# Patient Record
Sex: FEMALE | Race: BLACK OR AFRICAN AMERICAN | Employment: OTHER | URBAN - METROPOLITAN AREA
[De-identification: names, ages, dates, MRNs, and addresses within clinical notes are randomized per-mention and may not be internally consistent; named-entity substitution may affect disease eponyms.]

---

## 2017-01-05 ENCOUNTER — NURSE NAVIGATOR (OUTPATIENT)
Dept: FAMILY MEDICINE CLINIC | Age: 82
End: 2017-01-05

## 2017-01-16 ENCOUNTER — OFFICE VISIT (OUTPATIENT)
Dept: FAMILY MEDICINE CLINIC | Age: 82
End: 2017-01-16

## 2017-01-16 VITALS
OXYGEN SATURATION: 98 % | TEMPERATURE: 97.7 F | RESPIRATION RATE: 18 BRPM | SYSTOLIC BLOOD PRESSURE: 135 MMHG | HEIGHT: 67 IN | HEART RATE: 60 BPM | DIASTOLIC BLOOD PRESSURE: 70 MMHG

## 2017-01-16 DIAGNOSIS — K21.00 GASTROESOPHAGEAL REFLUX DISEASE WITH ESOPHAGITIS: ICD-10-CM

## 2017-01-16 DIAGNOSIS — E11.9 TYPE 2 DIABETES MELLITUS WITHOUT COMPLICATION, WITHOUT LONG-TERM CURRENT USE OF INSULIN (HCC): Primary | ICD-10-CM

## 2017-01-16 DIAGNOSIS — I10 ESSENTIAL HYPERTENSION: ICD-10-CM

## 2017-01-16 LAB — HBA1C MFR BLD HPLC: 4.9 %

## 2017-01-16 NOTE — PROGRESS NOTES
Chief Complaint   Patient presents with   [facility] Follow Up     pt's daughter states  pt had mild troponin elevation

## 2017-01-16 NOTE — PROGRESS NOTES
Chronic Illness Visit    Today's Date:  2017  Patient's Name: Sneha Overton   Patient's :  2/3/1933     History:     Chief Complaint   Patient presents with   [facility] Follow Up     pt's daughter states  pt had mild troponin elevation       Sneha Overton is a 80 y.o. female presenting for chronic illness follow up. Since last visit patient was briefly in a nursing home she is now back home with her daughter. She is wheelchair bound and needs help with her ADLs. Diabetes/Hypertension     BP today is at goal today <130/80. Patients risk factors include: none   Home Blood Sugars are in the following range: . Denies hypoglycemic episodes   Medications regimen is as follows: lisinopril and verapamil   Last HgbA1C: 4.2017  Daily exercise and diet are as follows: reports minimal amount of walking. Eats plenty of fruits and vegetables   Weight is stable since the last visit. Encouraged that patient eat 3 regular meals a day. Takes the below meds regularly with no side effects. Taking daily ASA 81 mg.    Last LDL:86   Last DM eye exam: 2014   Last urine microalbumin: 3/25/14     GERD     Patient reports history of GERD   Practicing life style modifications (limited amounts of acidic foods) and using the below medication   Reports improvement of symptoms with ranitidine denies side effects   Denies N/V, excessive belching and dysphagia   No recent endocsopy or colonoscopy    Past Medical History   Diagnosis Date    Arthritis     Breast cancer ([address]      right breast (radiation)    Diabetes ([address]      diet controlled    GERD (gastroesophageal reflux disease)     Hypertension     Memory changes      Past Surgical History   Procedure Laterality Date    Hx appendectomy      Hx cholecystectomy      Hx breast biopsy       Social History     Social History    Marital status:      Spouse name: N/A    Number of children: N/A    Years of education: N/A     Social History Main Topics    Smoking status: Never Smoker    Smokeless tobacco: Never Used    Alcohol use No    Drug use: No    Sexual activity: No     Other Topics Concern    None     Social History Narrative     Family History   Problem Relation Age of Onset    Cancer Mother     Cancer Father     Diabetes Sister     Diabetes Paternal Grandmother     Cancer Other      Allergies   Allergen Reactions    Pcn [Penicillins] Other (comments)     Unknown-happened when she was very young       Problem List:      Patient Active Problem List   Diagnosis Code    HTN (hypertension) I10    Diabetes mellitus ([facility] E11.9    GERD (gastroesophageal reflux disease) K21.9    Frozen shoulder M75.00    Breast cancer ([facility] C50.919    OA (osteoarthritis) of knee M17.9    Osteoarthritis, 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    Tear of rotator cuff M75.100    Memory loss R41.3    ACS (acute coronary syndrome) ([facility] I24.9    Elevated troponin R79.89    NSTEMI (non-ST elevated myocardial infarction) ([facility] I21.4    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, F02.80    UTI (urinary tract infection) N39.0       Medications:     Current Outpatient Prescriptions   Medication Sig    atorvastatin (LIPITOR) 80 mg tablet take 1 tablet by mouth once daily at bedtime    aspirin delayed-release 81 mg tablet take 1 tablet by mouth once daily    lisinopril (PRINIVIL, ZESTRIL) 10 mg tablet take 1 tablet by mouth once daily    ferrous sulfate 325 mg (65 mg iron) tablet Take 1 Tab by mouth two (2) times a day.  CALCIUM CARBONATE (CALCIUM 600) 1,500 (600) mg tab Take 1 Tab by mouth daily. take 1 tablet by mouth twice a day    ranitidine (ZANTAC) 150 mg tablet take 1 tablet by mouth twice a day    verapamil ER (VERELAN) 240 mg SR capsule Take 1 Cap by mouth daily.     OTHER Daughter states PCP gave compound cream    memantine (NAMENDA) 10 mg tablet 1 q am    ACCU-CHEK DINH PLUS TEST STRP strip use as directed    ACCU-CHEK DINH PLUS TEST STRP strip use as directed    glucosamine-chondroit-vit c-mn 500-400 mg capsule Take 1 Cap by mouth two (2) times a day.  Lancets (ACCU-CHEK MULTICLIX LANCET) misc 100 accu-chek lancets    multivitamin (DAILY MULTIPLE) tablet Take 1 Tab by mouth daily.  hydrocortisone (ALA-DEEDEE) 1 % lotion Apply  to affected area two (2) times a day. use thin layer    bimatoprost (LUMIGAN) 0.03 % ophthalmic drops Administer 1 Drop to both eyes every evening.  peg 400-propylene glycol (SYSTANE) 0.4-0.3 % drop Administer  to left eye as needed. No current facility-administered medications for this visit. Constitutional: positive for fatigue. negative for fevers, chills, sweats. Respiratory: negative for cough, sputum or wheezing  Cardiovascular: negative for chest pain, chest pressure/discomfort, dyspnea  Gastrointestinal: negative for nausea, vomiting, diarrhea, constipation and abdominal pain  Musculoskeletal:negative for myalgias and arthralgias  Neurological: negative for headaches and dizziness  Behavioral/Psych: negative for anxiety and depression    Physical Assessment:   VS:    Vitals:    01/16/17 1306 01/16/17 1413   BP: 157/72 135/70   Pulse: 60    Resp: 18    Temp: 97.7 °F (36.5 °C)    TempSrc: Oral    SpO2: 98%    Height: 5' 7\" (1.702 m)          Lab Results   Component Value Date/Time    Sodium 141 10/25/2016 02:01 AM    Potassium 3.8 10/25/2016 02:01 AM    Chloride 107 10/25/2016 02:01 AM    CO2 29 10/25/2016 02:01 AM    Anion gap 5 10/25/2016 02:01 AM    Glucose 71 10/25/2016 02:01 AM    BUN 19 10/25/2016 02:01 AM    Creatinine 0.69 10/25/2016 02:01 AM    BUN/Creatinine ratio 28 10/25/2016 02:01 AM    GFR est AA >60 10/25/2016 02:01 AM    GFR est non-AA >60 10/25/2016 02:01 AM    Calcium 8.4 10/25/2016 02:01 AM       General:   Well-groomed, well-nourished, in no distress, pleasant, alert, appropriate and conversant.    Mouth:  Good dentition, oropharynx WNL without membranes, exudates, petechiae or ulcers  Neck:   Neck supple, no swelling, mass or tenderness, no thyromegaly  Cardiovasc:   RRR, no MRG. Pulses 2+ and symmetric at distal extremities. Pulmonary:   Lungs clear bilaterally. Normal respiratory effort. Abdomen:   Abdomen soft, NT, ND, NAB. Extremities:   No edema, no TTP bilateral calves. LEs warm and well-perfused. Neuro:   Alert and oriented, no focal deficits. No facial asymmetry noted. Skin:    Diffuse hyperpigmented plaques on the back of the neck. No erythema or edema present. Psych:  No pressured speech or abnormal thought content        Assessment/Plan & Orders:       1. Type 2 diabetes mellitus without complication, without long-term current use of insulin ([facility]    2. Essential hypertension    3. Gastroesophageal reflux disease with esophagitis        Orders Placed This Encounter    AMB POC HEMOGLOBIN A1C       1. Hypertension BP at goal continue w/ current meds    2. Diabetes last HgbA1c 4.9 continue with diet controll.      3. GERD continue ranitidine      Follow up in 2-3 months     Shyam Augustine MD  Family Medicine  1/16/2017  1:00 PM

## 2017-01-16 NOTE — MR AVS SNAPSHOT
Visit Information Date & Time Provider Department Dept. Phone Encounter #  
 1/16/2017  1:00 PM Jen Hutton MD [facility] [phone] [id number] Your Appointments 3/6/2017  3:00 PM  
Follow Up with Griselda Avitia MD  
[facility]) Appt Note: 6 month f/up; PT FAMILY R/S FROM 11/23/2016  
 [address] [place]  [place] [address] [phone]  
  
   
 [place] [id number] Upcoming Health Maintenance Date Due  
 FOOT EXAM Q1 9/29/2015 EYE EXAM RETINAL OR DILATED Q1 5/11/2016 HEMOGLOBIN A1C Q6M 10/12/2016 Pneumococcal 65+ High/Highest Risk (2 of 2 - PPSV23) 11/4/2016 MICROALBUMIN Q1 4/12/2017 MEDICARE YEARLY EXAM 4/13/2017 GLAUCOMA SCREENING Q2Y 5/11/2017 LIPID PANEL Q1 10/25/2017 DTaP/Tdap/Td series (2 - Td) 9/9/2026 Allergies as of 1/16/2017  Review Complete On: 1/16/2017 By: Moises Vidales LPN Severity Noted Reaction Type Reactions Pcn [Penicillins]  03/05/2013    Other (comments) Unknown-happened when she was very young Current Immunizations  Reviewed on 10/19/2016 Name Date Influenza High Dose Vaccine PF 9/9/2016 Influenza Vaccine 12/29/2014, 12/26/2013 Not reviewed this visit You Were Diagnosed With   
  
 Codes Comments Type 2 diabetes mellitus without complication, without long-term current use of insulin (HCC)    -  Primary ICD-10-CM: E11.9 ICD-9-CM: 250.00 Essential hypertension     ICD-10-CM: I10 
ICD-9-CM: 401.9 Vitals BP Pulse Temp Resp Height(growth percentile) SpO2  
 157/72 (BP 1 Location: Left arm, BP Patient Position: Sitting) 60 97.7 °F (36.5 °C) (Oral) 18 5' 7\" (1.702 m) 98% OB Status Smoking Status Postmenopausal Never Smoker Vitals History [facility] [address] Your Updated Medication List  
  
   
This list is accurate as of: 1/16/17  2:07 PM.  Always use your most recent med list.  
  
  
  
  
 * ACCU-CHEK DINH PLUS TEST STRP strip Generic drug:  glucose blood VI test strips  
use as directed * ACCU-CHEK DINH PLUS TEST STRP strip Generic drug:  glucose blood VI test strips  
use as directed  
  
 aspirin delayed-release 81 mg tablet  
take 1 tablet by mouth once daily  
  
 atorvastatin 80 mg tablet Commonly known as:  LIPITOR  
take 1 tablet by mouth once daily at bedtime CALCIUM CARBONATE 600 mg (1,500 mg) tablet Generic drug:  calcium carbonate Take 1 Tab by mouth daily. take 1 tablet by mouth twice a day  
  
 ferrous sulfate 325 mg (65 mg iron) tablet Take 1 Tab by mouth two (2) times a day. glucosamine-chondroit-vit c-mn 500-400 mg capsule Take 1 Cap by mouth two (2) times a day. hydrocortisone 1 % lotion Commonly known as:  ALA-DEEDEE Apply  to affected area two (2) times a day. use thin layer Lancets Misc Commonly known as:  ACCU-CHEK MULTICLIX LANCET  
100 accu-chek lancets  
  
 lisinopril 10 mg tablet Commonly known as:  PRINIVIL, ZESTRIL  
take 1 tablet by mouth once daily LUMIGAN 0.03 % ophthalmic drops Generic drug:  bimatoprost  
Administer 1 Drop to both eyes every evening. memantine 10 mg tablet Commonly known as:  NAMENDA  
1 q am  
  
 multivitamin tablet Commonly known as:  DAILY MULTIPLE Take 1 Tab by mouth daily. OTHER Daughter states PCP gave compound cream  
  
 raNITIdine 150 mg tablet Commonly known as:  ZANTAC  
take 1 tablet by mouth twice a day SYSTANE (PROPYLENE GLYCOL) 0.4-0.3 % Drop Generic drug:  peg 400-propylene glycol Administer  to left eye as needed. verapamil  mg ER capsule Commonly known as:  Immanuel Arriaza Take 1 Cap by mouth daily. * Notice: This list has 2 medication(s) that are the same as other medications prescribed for you. Read the directions carefully, and ask your doctor or other care provider to review them with you. We Performed the Following AMB POC HEMOGLOBIN A1C [64065 CPT(R)] Patient Instructions Eating Healthy Foods: Care Instructions Your Care Instructions Eating healthy foods can help lower your risk for disease. Healthy food gives you energy and keeps your heart strong, your brain active, your muscles working, and your bones strong. A healthy diet includes a variety of foods from the basic food groups: grains, vegetables, fruits, milk and milk products, and meat and beans. Some people may eat more of their favorite foods from only one food group and, as a result, miss getting the nutrients they need. So, it is important to pay attention not only to what you eat but also to what you are missing from your diet. You can eat a healthy, balanced diet by making a few small changes. Follow-up care is a key part of your treatment and safety. Be sure to make and go to all appointments, and call your doctor if you are having problems. Its also a good idea to know your test results and keep a list of the medicines you take. How can you care for yourself at home? Look at what you eat · Keep a food diary for a week or two and record everything you eat or drink. Track the number of servings you eat from each food group. · For a balanced diet every day, eat a variety of: ¨ 6 or more ounce-equivalents of grains, such as cereals, breads, crackers, rice, or pasta, every day. An ounce-equivalent is 1 slice of bread, 1 cup of ready-to-eat cereal, or ½ cup of cooked rice, cooked pasta, or cooked cereal. 
¨ 2½ cups of vegetables, especially: § Dark-green vegetables such as broccoli and spinach. § Orange vegetables such as carrots and sweet potatoes. § Dry beans (such as no and kidney beans) and peas (such as lentils). ¨ 2 cups of fresh, frozen, or canned fruit. A small apple or 1 banana or orange equals 1 cup. ¨ 3 cups of nonfat or low-fat milk, yogurt, or other milk products. ¨ 5½ ounces of meat and beans, such as chicken, fish, lean meat, beans, nuts, and seeds. One egg, 1 tablespoon of peanut butter, ½ ounce nuts or seeds, or ¼ cup of cooked beans equals 1 ounce of meat. · Learn how to read food labels for serving sizes and ingredients. Fast-food and convenience-food meals often contain few or no fruits or vegetables. Make sure you eat some fruits and vegetables to make the meal more nutritious. · Look at your food diary. For each food group, add up what you have eaten and then divide the total by the number of days. This will give you an idea of how much you are eating from each food group. See if you can find some ways to change your diet to make it more healthy. Start small · Do not try to make dramatic changes to your diet all at once. You might feel that you are missing out on your favorite foods and then be more likely to fail. · Start slowly, and gradually change your habits. Try some of the following: ¨ Use whole wheat bread instead of white bread. ¨ Use nonfat or low-fat milk instead of whole milk. ¨ Eat brown rice instead of white rice, and eat whole wheat pasta instead of white-flour pasta. ¨ Try low-fat cheeses and low-fat yogurt. ¨ Add more fruits and vegetables to meals and have them for snacks. ¨ Add lettuce, tomato, cucumber, and onion to sandwiches. ¨ Add fruit to yogurt and cereal. 
Enjoy food · You can still eat your favorite foods. You just may need to eat less of them. If your favorite foods are high in fat, salt, and sugar, limit how often you eat them, but do not cut them out entirely. · Eat a wide variety of foods. Make healthy choices when eating out · The type of restaurant you choose can help you make healthy choices. Even fast-food chains are now offering more low-fat or healthier choices on the menu. · Choose smaller portions, or take half of your meal home. · When eating out, try: ¨ A veggie pizza with a whole wheat crust or grilled chicken (instead of sausage or pepperoni). ¨ Pasta with roasted vegetables, grilled chicken, or marinara sauce instead of cream sauce. ¨ A vegetable wrap or grilled chicken wrap. ¨ Broiled or poached food instead of fried or breaded items. Make healthy choices easy · Buy packaged, prewashed, ready-to-eat fresh vegetables and fruits, such as baby carrots, salad mixes, and chopped or shredded broccoli and cauliflower. · Buy packaged, presliced fruits, such as melon or pineapple. · Choose 100% fruit or vegetable juice instead of soda. Limit juice intake to 4 to 6 oz (½ to ¾ cup) a day. · Blend low-fat yogurt, fruit juice, and canned or frozen fruit to make a smoothie for breakfast or a snack. Where can you learn more? Go to http://michell-everardo.info/. Enter T756 in the search box to learn more about \"Eating Healthy Foods: Care Instructions. \" Current as of: November 20, 2015 Content Version: 11.1 © 8305-3895 woodpellets.com. Care instructions adapted under license by [employer] (which disclaims liability or warranty for this information). If you have questions about a medical condition or this instruction, always ask your healthcare professional. [place] [address] any warranty or liability for your use of this information. Introducing [facility]! Dear Salinas Funes: 
Thank you for requesting a Toonimo account. Our records indicate that you already have an active Toonimo account. You can access your account anytime at [email or web address] Did you know that you can access your hospital and ER discharge instructions at any time in katena? You can also review all of your test results from your hospital stay or ER visit. Additional Information If you have questions, please visit the Frequently Asked Questions section of the katena website at [email or web address] Remember, katena is NOT to be used for urgent needs. For medical emergencies, dial 911. Now available from your iPhone and Android! Please provide this summary of care documentation to your next provider. Your primary care clinician is listed as Annmarie Kenney. If you have any questions after today's visit, please call [phone].

## 2017-01-16 NOTE — PATIENT INSTRUCTIONS

## 2017-01-23 NOTE — TELEPHONE ENCOUNTER
Requested Prescriptions     Pending Prescriptions Disp Refills    atorvastatin (LIPITOR) 80 mg tablet 30 Tab 0     Patients daughter is calling to refill the prescription. Says her mom is completely out of medication. Would like a call back if there is an issue with the medication being filled.

## 2017-01-27 RX ORDER — ATORVASTATIN CALCIUM 80 MG/1
TABLET, FILM COATED ORAL
Qty: 30 TAB | Refills: 3 | Status: SHIPPED | OUTPATIENT
Start: 2017-01-27 | End: 2017-01-30 | Stop reason: SDUPTHER

## 2017-01-31 RX ORDER — ATORVASTATIN CALCIUM 80 MG/1
TABLET, FILM COATED ORAL
Qty: 30 TAB | Refills: 3 | Status: SHIPPED | OUTPATIENT
Start: 2017-01-31 | End: 2017-05-18 | Stop reason: SDUPTHER

## 2017-02-09 DIAGNOSIS — E11.9 TYPE 2 DIABETES MELLITUS WITHOUT COMPLICATION, WITHOUT LONG-TERM CURRENT USE OF INSULIN (HCC): ICD-10-CM

## 2017-02-09 DIAGNOSIS — R41.3 MEMORY LOSS: Primary | ICD-10-CM

## 2017-02-15 DIAGNOSIS — Z76.0 MEDICATION REFILL: ICD-10-CM

## 2017-02-15 DIAGNOSIS — K21.00 GASTROESOPHAGEAL REFLUX DISEASE WITH ESOPHAGITIS: ICD-10-CM

## 2017-02-16 RX ORDER — RANITIDINE 150 MG/1
TABLET, FILM COATED ORAL
Qty: 60 TAB | Refills: 6 | Status: SHIPPED | OUTPATIENT
Start: 2017-02-16 | End: 2017-08-30 | Stop reason: SDUPTHER

## 2017-02-21 DIAGNOSIS — Z76.0 MEDICATION REFILL: ICD-10-CM

## 2017-02-23 RX ORDER — VERAPAMIL HYDROCHLORIDE 240 MG/1
240 CAPSULE, EXTENDED RELEASE ORAL DAILY
Qty: 30 CAP | Refills: 6 | Status: SHIPPED | OUTPATIENT
Start: 2017-02-23 | End: 2017-08-30 | Stop reason: SDUPTHER

## 2017-02-28 RX ORDER — MEMANTINE HYDROCHLORIDE 10 MG/1
TABLET ORAL
Qty: 30 TAB | Refills: 5 | Status: SHIPPED | OUTPATIENT
Start: 2017-02-28 | End: 2017-04-20 | Stop reason: SDUPTHER

## 2017-02-28 RX ORDER — BIMATOPROST 0.3 MG/ML
1 SOLUTION/ DROPS OPHTHALMIC EVERY EVENING
Status: CANCELLED | OUTPATIENT
Start: 2017-02-28

## 2017-04-20 ENCOUNTER — OFFICE VISIT (OUTPATIENT)
Dept: NEUROLOGY | Age: 82
End: 2017-04-20

## 2017-04-20 VITALS
HEIGHT: 67 IN | HEART RATE: 53 BPM | OXYGEN SATURATION: 98 % | TEMPERATURE: 98.3 F | DIASTOLIC BLOOD PRESSURE: 62 MMHG | SYSTOLIC BLOOD PRESSURE: 120 MMHG

## 2017-04-20 DIAGNOSIS — R26.9 GAIT DISTURBANCE: ICD-10-CM

## 2017-04-20 DIAGNOSIS — I10 ESSENTIAL HYPERTENSION: ICD-10-CM

## 2017-04-20 DIAGNOSIS — I67.9 SMALL VESSEL DISEASE, CEREBROVASCULAR: ICD-10-CM

## 2017-04-20 DIAGNOSIS — F03.90 DEMENTIA ARISING IN THE SENIUM AND PRESENIUM (HCC): ICD-10-CM

## 2017-04-20 DIAGNOSIS — R41.3 MEMORY LOSS: Primary | ICD-10-CM

## 2017-04-20 RX ORDER — MEMANTINE HYDROCHLORIDE 10 MG/1
TABLET ORAL
Qty: 30 TAB | Refills: 5 | Status: SHIPPED | OUTPATIENT
Start: 2017-04-20 | End: 2017-08-30 | Stop reason: SDUPTHER

## 2017-04-20 NOTE — PROGRESS NOTES
[address]                [facility], [address]    4/20/2017    HPI:  Esmer Zuniga is a 80 y.o., right handed,   female, who presents with memory loss. She is accompanied by daughter, who reports gradual onset  Date 3 years ago, much worse over the past 2 years. Family noted difficulty with placement of items difficulty remember to turn burn resolves. There is been no dressing apraxia. Patient is essentially confused. If she naps during the day at times. Patient and daughter deny any recent head trauma, a. Patient also has history of diabetes, hypertension, and breast cancer. There is no family history of dementia. There is no personal history of strokes. Patient returns  In follow up .daughter reports she can no longer call her by name. She is not walking. She does have occasional foot numbness in the mornings. She denies any significant pain both she and daughter are happy with medication and do not want to start another medication at this time. Current Outpatient Prescriptions   Medication Sig Dispense Refill    memantine (NAMENDA) 10 mg tablet 1 q am 30 Tab 5    verapamil ER (VERELAN) 240 mg ER capsule Take 1 Cap by mouth daily. 30 Cap 6    raNITIdine (ZANTAC) 150 mg tablet take 1 tablet by mouth twice a day 60 Tab 6    atorvastatin (LIPITOR) 80 mg tablet take 1 tablet by mouth once daily at bedtime 30 Tab 3    aspirin delayed-release 81 mg tablet take 1 tablet by mouth once daily 30 Tab 6    lisinopril (PRINIVIL, ZESTRIL) 10 mg tablet take 1 tablet by mouth once daily 30 Tab 6    ferrous sulfate 325 mg (65 mg iron) tablet Take 1 Tab by mouth two (2) times a day. 60 Tab 6    CALCIUM CARBONATE (CALCIUM 600) 1,500 (600) mg tab Take 1 Tab by mouth daily.  take 1 tablet by mouth twice a day 60 Tab 6    ACCU-CHEK DINH PLUS TEST STRP strip use as directed 1 Package 11    ACCU-CHEK DINH PLUS TEST STRP strip use as directed 1 Package 11    glucosamine-chondroit-vit c-mn 500-400 mg capsule Take 1 Cap by mouth two (2) times a day. 60 Cap 3    Lancets (ACCU-CHEK MULTICLIX LANCET) misc 100 accu-chek lancets 1 Package 11    multivitamin (DAILY MULTIPLE) tablet Take 1 Tab by mouth daily. 30 Tab 6    hydrocortisone (ALA-DEEDEE) 1 % lotion Apply  to affected area two (2) times a day. use thin layer      bimatoprost (LUMIGAN) 0.03 % ophthalmic drops Administer 1 Drop to both eyes every evening.  peg 400-propylene glycol (SYSTANE) 0.4-0.3 % drop Administer  to left eye as needed.       OTHER Daughter states PCP gave compound cream         Past Medical History:   Diagnosis Date    Arthritis     Breast cancer ([facility] 2007    right breast (radiation)    Diabetes ([facility]     diet controlled    GERD (gastroesophageal reflux disease)     Hypertension     Memory changes        Past Surgical History:   Procedure Laterality Date    HX APPENDECTOMY      HX BREAST BIOPSY      HX CHOLECYSTECTOMY       Family History   Problem Relation Age of Onset    Cancer Mother     Cancer Father     Diabetes Sister     Diabetes Paternal Grandmother     Cancer Other      Allergies   Allergen Reactions    Pcn [Penicillins] Other (comments)     Unknown-happened when she was very young       Review of Systems:   Review of Systems - History obtained from child and the patient  General ROS: positive for  - malaise  Psychological ROS: positive for - memory difficulties  ENT ROS: positive for - sore throat  Hematological and Lymphatic ROS: negative  Endocrine ROS: negative  Respiratory ROS: no cough, shortness of breath, or wheezing  Cardiovascular ROS: no chest pain or dyspnea on exertion  Gastrointestinal ROS: positive for - abdominal pain and heartburn  Genito-Urinary ROS: negative  Musculoskeletal ROS: positive for - gait disturbance, joint pain and pain in knee - bilateral and shoulder  Neurological ROS: positive for - gait disturbance, impaired coordination/balance and memory loss  Dermatological ROS: negative    PHYSICAL EXAMINATION:    Visit Vitals    /62    Pulse (!) 53    Temp 98.3 °F (36.8 °C) (Oral)    Ht 5' 7\" (1.702 m)    SpO2 98%     General:  Well defined, nourished, and groomed individual in no acute distress. Neck: Supple, nontender, thyroid within normal limits, no JVD, no bruits, no pain with resistance to active range of motion. Poor dentition  Heart: Regular rate and rhythm, no murmurs, rub, or gallop. Normal S1S2. Lungs:  Clear to auscultation bilaterally with equal chest expansion, no cough, no wheeze  Musculoskeletal:  Extremities revealed no edema and had full range of motion of joints. Psych:  Good mood and normal affect    NEUROLOGICAL EXAMINATION:     Mental Status:   Alert and oriented to person, not place,or time with  Poor recent and remote memory  Attention span and concentration are normal. Speech is sparse with a poor  fund of knowledge. Marked apraxia  Cranial Nerves:    II, III, IV, VI:  Visual acuity grossly intact. Visual fields are normal.    Pupils are equal, round, and reactive to light and accommodation. Extra-ocular movements are full and fluid. , no ptosis or nystagmus. Gait and Station:    No muscle wasting or fasiculations noted. Motor tone is normal. Strength is consistent with effort. All 4 extremities. reviewed   eeg as normal, ct scan no acute infarct, evidence of small vessel disease      Assessment and Plan: Brandie Armendariz is a 80 y.o. right handed female whose history and physical are consistent with dementia. Brandie Armendariz who has risk factors including hypertension,diabetes, age, history of breast cancer    Julita Bryant was seen today for memory loss.     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 Reviewed  Family notes re increased confusion on higher dose of Namenda will stay a 1 daily  Reviewed workup in Connect /care including notes and labs  I spent 25 minutes with the patient in face-to-face consultation, of which greater than 50% was spent in counseling and coordination of care as described above.

## 2017-04-20 NOTE — MR AVS SNAPSHOT
Visit Information Date & Time Provider Department Dept. Phone Encounter #  
 4/20/2017  1:00 PM Aissatou Vital MD [facility] [phone] [id number] Follow-up Instructions Return in about 6 months (around 10/20/2017). Follow-up and Disposition History Your Appointments 7/17/2017  1:00 PM  
[facility] Appt Note: 6 month f/u  
 [address] [facility] [address]  
[facility] [address]  
  
    
 10/20/2017  1:45 PM  
Follow Up with Aissatou Vital MD  
[facility]) Appt Note: 6mon f/u  
 [address] [address] [phone]  
  
   
 [facility] [address] Upcoming Health Maintenance Date Due  
 FOOT EXAM Q1 9/29/2015 EYE EXAM RETINAL OR DILATED Q1 5/11/2016 Pneumococcal 65+ High/Highest Risk (2 of 2 - PPSV23) 11/4/2016 MICROALBUMIN Q1 4/12/2017 MEDICARE YEARLY EXAM 4/13/2017 GLAUCOMA SCREENING Q2Y 5/11/2017 HEMOGLOBIN A1C Q6M 7/16/2017 LIPID PANEL Q1 10/25/2017 DTaP/Tdap/Td series (2 - Td) 9/9/2026 Allergies as of 4/20/2017  Review Complete On: 4/20/2017 By: Aissatou Vital MD  
  
 Severity Noted Reaction Type Reactions Pcn [Penicillins]  03/05/2013    Other (comments) Unknown-happened when she was very young Current Immunizations  Reviewed on 10/19/2016 Name Date Influenza High Dose Vaccine PF 9/9/2016 Influenza Vaccine 12/29/2014, 12/26/2013 Not reviewed this visit You Were Diagnosed With   
  
 Codes Comments Memory loss    -  Primary ICD-10-CM: R41.3 ICD-9-CM: 780.93 Gait disturbance     ICD-10-CM: R26.9 ICD-9-CM: 781.2 Small vessel disease, cerebrovascular     ICD-10-CM: I67.9 ICD-9-CM: 437.9  Essential hypertension     ICD-10-CM: I10 
 ICD-9-CM: 401.9 Dementia arising in the senium and presenium     ICD-10-CM: F03.90 ICD-9-CM: 294.8 Vitals BP Pulse Temp Height(growth percentile) SpO2 OB Status 120/62 (!) 53 98.3 °F (36.8 °C) (Oral) 5' 7\" (1.702 m) 98% Postmenopausal  
 Smoking Status Never Smoker Preferred Pharmacy Pharmacy Name Phone [address] [facility], [address] Your Updated Medication List  
  
   
This list is accurate as of: 4/20/17  1:35 PM.  Always use your most recent med list.  
  
  
  
  
 * ACCU-CHEK DINH PLUS TEST STRP strip Generic drug:  glucose blood VI test strips  
use as directed * ACCU-CHEK DINH PLUS TEST STRP strip Generic drug:  glucose blood VI test strips  
use as directed  
  
 aspirin delayed-release 81 mg tablet  
take 1 tablet by mouth once daily  
  
 atorvastatin 80 mg tablet Commonly known as:  LIPITOR  
take 1 tablet by mouth once daily at bedtime CALCIUM CARBONATE 600 mg calcium (1,500 mg) tablet Generic drug:  calcium carbonate Take 1 Tab by mouth daily. take 1 tablet by mouth twice a day  
  
 ferrous sulfate 325 mg (65 mg iron) tablet Take 1 Tab by mouth two (2) times a day. glucosamine-chondroit-vit c-mn 500-400 mg capsule Take 1 Cap by mouth two (2) times a day. hydrocortisone 1 % lotion Commonly known as:  ALA-DEEDEE Apply  to affected area two (2) times a day. use thin layer Lancets Misc Commonly known as:  ACCU-CHEK MULTICLIX LANCET  
100 accu-chek lancets  
  
 lisinopril 10 mg tablet Commonly known as:  PRINIVIL, ZESTRIL  
take 1 tablet by mouth once daily LUMIGAN 0.03 % ophthalmic drops Generic drug:  bimatoprost  
Administer 1 Drop to both eyes every evening. memantine 10 mg tablet Commonly known as:  NAMENDA  
1 q am  
  
 multivitamin tablet Commonly known as:  DAILY MULTIPLE Take 1 Tab by mouth daily.   
  
 OTHER  
 Daughter states PCP gave compound cream  
  
 raNITIdine 150 mg tablet Commonly known as:  ZANTAC  
take 1 tablet by mouth twice a day SYSTANE (PROPYLENE GLYCOL) 0.4-0.3 % Drop Generic drug:  peg 400-propylene glycol Administer  to left eye as needed. verapamil  mg ER capsule Commonly known as:  Ariadna Peyton Take 1 Cap by mouth daily. * Notice: This list has 2 medication(s) that are the same as other medications prescribed for you. Read the directions carefully, and ask your doctor or other care provider to review them with you. Prescriptions Sent to Pharmacy Refills  
 memantine (NAMENDA) 10 mg tablet 5 Si q am  
 Class: Normal  
 Pharmacy: [address] [place] [address], [address] [place] [address] #: [phone] Follow-up Instructions Return in about 6 months (around 10/20/2017). Introducing [address] Dear Jamie Gonsales: 
Thank you for requesting a Telx account. Our records indicate that you already have an active Telx account. You can access your account anytime at [email or web address] Did you know that you can access your hospital and ER discharge instructions at any time in Telx? You can also review all of your test results from your hospital stay or ER visit. Additional Information If you have questions, please visit the Frequently Asked Questions section of the Telx website at [email or web address] Remember, Telx is NOT to be used for urgent needs. For medical emergencies, dial 911. Now available from your iPhone and Android! Please provide this summary of care documentation to your next provider. Your primary care clinician is listed as Antonino Fowler. If you have any questions after today's visit, please call [phone].

## 2017-05-15 PROBLEM — F02.80 DEMENTIA ASSOCIATED WITH OTHER UNDERLYING DISEASE: Chronic | Status: ACTIVE | Noted: 2017-05-15

## 2017-07-14 DIAGNOSIS — Z76.0 MEDICATION REFILL: ICD-10-CM

## 2017-07-17 ENCOUNTER — OFFICE VISIT (OUTPATIENT)
Dept: FAMILY MEDICINE CLINIC | Age: 82
End: 2017-07-17

## 2017-07-17 VITALS
RESPIRATION RATE: 18 BRPM | WEIGHT: 141 LBS | TEMPERATURE: 98.4 F | SYSTOLIC BLOOD PRESSURE: 142 MMHG | DIASTOLIC BLOOD PRESSURE: 70 MMHG | HEIGHT: 67 IN | BODY MASS INDEX: 22.13 KG/M2 | HEART RATE: 74 BPM

## 2017-07-17 DIAGNOSIS — I10 ESSENTIAL HYPERTENSION: ICD-10-CM

## 2017-07-17 DIAGNOSIS — R05.9 COUGH: ICD-10-CM

## 2017-07-17 DIAGNOSIS — E11.9 DIABETES MELLITUS TYPE 2, DIET-CONTROLLED (HCC): Primary | ICD-10-CM

## 2017-07-17 LAB — HBA1C MFR BLD HPLC: 5.2 %

## 2017-07-17 NOTE — PROGRESS NOTES
Patient: Kayley Craig MRN: [id number]  SSN: [id number]    YOB: 1933  Age: 80 y.o. Sex: female      Date of Service: 7/17/2017   Provider: DIAN Herron         REASON FOR VISIT:   Chief Complaint   Patient presents with    Follow Up Chronic Condition     diabetes, HTN     Cough     pt states she been having a cough for the last few weeks    Foot Swelling     pt states both feet been swelling off and on since she been out of rehab        VITALS:   Visit Vitals    /73 (BP 1 Location: Left arm, BP Patient Position: Sitting)    Pulse 74    Temp 98.4 °F (36.9 °C) (Oral)    Resp 18    Ht 5' 7\" (1.702 m)    Wt 141 lb (64 kg)    BMI 22.08 kg/m2       MEDICATIONS:   Current Outpatient Prescriptions on File Prior to Visit   Medication Sig Dispense Refill    glucose blood VI test strips (ACCU-CHEK DINH PLUS TEST STRP) strip For daily glucose checks 100 Strip 11    glucose blood VI test strips (ACCU-CHEK DINH PLUS TEST STRP) strip For daily glucose checks 100 Strip 11    ferrous sulfate 325 mg (65 mg iron) tablet Take 1 Tab by mouth two (2) times a day. 60 Tab 6    aspirin delayed-release 81 mg tablet take 1 tablet by mouth once daily 30 Tab 6    atorvastatin (LIPITOR) 80 mg tablet take 1 tablet by mouth once daily at bedtime 90 Tab 1    Lancets (ACCU-CHEK MULTICLIX LANCET) misc 100 accu-chek lancets 1 Package 11    memantine (NAMENDA) 10 mg tablet 1 q am 30 Tab 5    verapamil ER (VERELAN) 240 mg ER capsule Take 1 Cap by mouth daily. 30 Cap 6    raNITIdine (ZANTAC) 150 mg tablet take 1 tablet by mouth twice a day 60 Tab 6    lisinopril (PRINIVIL, ZESTRIL) 10 mg tablet take 1 tablet by mouth once daily 30 Tab 6    CALCIUM CARBONATE (CALCIUM 600) 1,500 (600) mg tab Take 1 Tab by mouth daily.  take 1 tablet by mouth twice a day 60 Tab 6    OTHER Daughter states PCP gave compound cream      glucosamine-chondroit-vit c-mn 500-400 mg capsule Take 1 Cap by mouth two (2) times a day. 60 Cap 3    multivitamin (DAILY MULTIPLE) tablet Take 1 Tab by mouth daily. 30 Tab 6    hydrocortisone (ALA-DEEDEE) 1 % lotion Apply  to affected area two (2) times a day. use thin layer      bimatoprost (LUMIGAN) 0.03 % ophthalmic drops Administer 1 Drop to both eyes every evening.  peg 400-propylene glycol (SYSTANE) 0.4-0.3 % drop Administer  to left eye as needed. No current facility-administered medications on file prior to visit.          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    Tear of rotator cuff M75.100    Memory loss R41.3    ACS (acute coronary syndrome) ([facility] I24.9    Elevated troponin R74.8    NSTEMI (non-ST elevated myocardial infarction) ([facility] I21.4    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, F02.80    UTI (urinary tract infection) N39.0    Dementia associated with other underlying disease F02.80        MEDICAL/SURGICAL HISTORY:  Past Medical History:   Diagnosis Date    Arthritis     Breast cancer ([facility] 2007    right breast (radiation)    Diabetes (HCC)     diet controlled    GERD (gastroesophageal reflux disease)     Hypertension     Memory changes       Past Surgical History:   Procedure Laterality Date    HX APPENDECTOMY      HX BREAST BIOPSY      HX CHOLECYSTECTOMY          FAMILY HISTORY:  Family History   Problem Relation Age of Onset    Cancer Mother     Cancer Father     Diabetes Sister     Diabetes Paternal Grandmother     Cancer Other         SOCIAL HISTORY:  Social History   Substance Use Topics    Smoking status: Never Smoker    Smokeless tobacco: Never Used    Alcohol use No         HISTORY OF PRESENT ILLNESS: Donna Huitron is a 80 y.o. female who presents to the office, accompanied by her daughter, for a routine follow up visit. Chronic Conditions -     Diabetes -  Currently the patient's condition is well controlled on diet alone  Last A1c: 4.9% on 1/16/17  Today's A1c: 5.4%  Home glucose readings:    Last lipid panel: 10/25/16, LDL 65 at that time. Patient is on statin therapy. Last foot exam: 1 month ago, Dr. Cata Levin. Last eye exam: 2 months ago per patient, now seeing a retina specialist  Last urine microalbumin: 4/12/16    Patient denies symptomatic hypo- or hyperglycemia since last visit. Denies headaches, lightheadedness, dizziness, chest pain, SOB, heart palpitations, nausea, vomiting, change in BMs, urinary frequency/urgency, skin changes/wounds, sensory disturbances     Hypertension -   Currently, the patient's condition is stable. BP a little elevated on intake, but improved with manual re-check  She is compliant with verapamil 240 mg and lisinopril 10 mg, for the most part. Daughter reports she occasionally refuses meds. Did not take them this morning     Patient denies headaches, visual disturbances, shortness of breath, chest pain, heart palpitations, lightheadedness, syncope     GERD -   Stable on ranitidine 150 mg     Memory Loss -   On Namenda. Follows with Dr. Pavithra Hair    Acute Concerns -   Cough -   Patient complains of dry cough x 2-3 weeks. No fevers, chills, chest pain, SOB, wheezing. REVIEW OF SYSTEMS:  Review of Systems   Constitutional: Negative for chills and fever. Respiratory: Positive for cough. Negative for hemoptysis, sputum production, shortness of breath and wheezing. Cardiovascular: Positive for leg swelling ( intermittend edema b/l feet and ankles). Negative for chest pain and palpitations. Gastrointestinal: Negative for nausea and vomiting. Neurological: Negative for dizziness. Psychiatric/Behavioral: Positive for memory loss. Negative for depression. The patient is not nervous/anxious. PHYSICAL EXAMINATION:  Physical Exam   Constitutional:   Appears chronically ill but awake, alert, and in no distress   HENT:   Head: Normocephalic and atraumatic. Mouth/Throat: Oropharynx is clear and moist.   Eyes: Conjunctivae are normal.   Cardiovascular: Normal rate, regular rhythm, normal heart sounds and intact distal pulses. Exam reveals no gallop and no friction rub. No murmur heard. Pulmonary/Chest: Effort normal and breath sounds normal. She has no wheezes. She has no rales. Musculoskeletal: She exhibits edema ( trace pitting edema b/l ankles, L > R). Neurological: She is alert. Skin: Skin is warm and dry. Psychiatric: Mood and affect normal.        RESULTS:  No results found for this visit on 07/17/17. ASSESSMENT/PLAN:  Didier Ahn was seen today for follow up chronic condition, cough and foot swelling. Diagnoses and all orders for this visit:    Diabetes mellitus type 2, diet-controlled ([facility]  - Diet controlled, stable, though slight increase in A1c noted since last visit. Will monitor. Encouraged continued healthy choices with diet   -     AMB POC HEMOGLOBIN A1C  -     AMB POC URINE, MICROALBUMIN, SEMIQUANTITATIVE    Essential hypertension  - Stable, continue current regimen     Cough  - Reassured of normal heart and lung exam today, possibly secondary to post-nasal drip or GERD. Less likely side effect of lisinopril as pt has tolerated this medication for a long time. - Advised to return if cough is not subsiding with supportive measures or if symptoms worsen    Patient and daughter express understanding and agree to the above plan.     Follow up in 3 months for Medicare Wellness Visit with DIAN Westbrook   July 17, 2017    2:47 PM

## 2017-07-17 NOTE — PROGRESS NOTES
Chief Complaint   Patient presents with    Follow Up Chronic Condition     diabetes, HTN     Cough     pt states she been having a cough for the last few weeks    Foot Swelling     pt states both feet been swelling off and on since she been out of rehab

## 2017-07-17 NOTE — MR AVS SNAPSHOT
Visit Information Date & Time Provider Department Dept. Phone Encounter #  
 7/17/2017  1:00 PM Raad Diamond [facility] 81 [phone] [id number] Your Appointments 10/17/2017  1:00 PM  
Office Visit with MD Raad Hernandez [facility]) Appt Note: medicare wellness [address] [facility] [employer] [address]  
[facility] [address]  
  
    
 10/20/2017  1:45 PM  
Follow Up with Constantine Ladd MD  
[address] [facility]) Appt Note: 6mon f/u  
 [address] [facility] [place] [facility] [address] [phone]  
  
   
 [facility] [address] Upcoming Health Maintenance Date Due  
 FOOT EXAM Q1 9/29/2015 EYE EXAM RETINAL OR DILATED Q1 5/11/2016 Pneumococcal 65+ High/Highest Risk (2 of 2 - PPSV23) 11/4/2016 MICROALBUMIN Q1 4/12/2017 MEDICARE YEARLY EXAM 4/13/2017 GLAUCOMA SCREENING Q2Y 5/11/2017 HEMOGLOBIN A1C Q6M 7/16/2017 INFLUENZA AGE 9 TO ADULT 8/1/2017 LIPID PANEL Q1 10/25/2017 DTaP/Tdap/Td series (2 - Td) 9/9/2026 Allergies as of 7/17/2017  Review Complete On: 7/17/2017 By: DIAN Diamond Severity Noted Reaction Type Reactions Pcn [Penicillins]  03/05/2013    Other (comments) Unknown-happened when she was very young Current Immunizations  Reviewed on 10/19/2016 Name Date Influenza High Dose Vaccine PF 9/9/2016 Influenza Vaccine 12/29/2014, 12/26/2013 Not reviewed this visit You Were Diagnosed With   
  
 Codes Comments Diabetes mellitus type 2, diet-controlled ([facility]    -  Primary ICD-10-CM: E11.9 ICD-9-CM: 250.00 Vitals BP Pulse Temp Resp Height(growth percentile) Weight(growth percentile) 142/70 74 98.4 °F (36.9 °C) (Oral) 18 5' 7\" (1.702 m) 141 lb (64 kg) BMI OB Status Smoking Status 22.08 kg/m2 Postmenopausal Never Smoker Vitals History BMI and BSA Data Body Mass Index Body Surface Area 22.08 kg/m 2 1.74 m 2 [facility] [facility], [address] Your Updated Medication List  
  
   
This list is accurate as of: 7/17/17  1:44 PM.  Always use your most recent med list.  
  
  
  
  
 aspirin delayed-release 81 mg tablet  
take 1 tablet by mouth once daily  
  
 atorvastatin 80 mg tablet Commonly known as:  LIPITOR  
take 1 tablet by mouth once daily at bedtime CALCIUM CARBONATE 600 mg calcium (1,500 mg) tablet Generic drug:  calcium carbonate Take 1 Tab by mouth daily. take 1 tablet by mouth twice a day  
  
 ferrous sulfate 325 mg (65 mg iron) tablet Take 1 Tab by mouth two (2) times a day. glucosamine-chondroit-vit c-mn 500-400 mg capsule Take 1 Cap by mouth two (2) times a day. * glucose blood VI test strips strip Commonly known as:  ACCU-CHEK DINH PLUS TEST STRP For daily glucose checks * glucose blood VI test strips strip Commonly known as:  ACCU-CHEK DINH PLUS TEST STRP For daily glucose checks  
  
 hydrocortisone 1 % lotion Commonly known as:  ALA-DEEDEE Apply  to affected area two (2) times a day. use thin layer Lancets Misc Commonly known as:  ACCU-CHEK MULTICLIX LANCET  
100 accu-chek lancets  
  
 lisinopril 10 mg tablet Commonly known as:  PRINIVIL, ZESTRIL  
take 1 tablet by mouth once daily LUMIGAN 0.03 % ophthalmic drops Generic drug:  bimatoprost  
Administer 1 Drop to both eyes every evening. memantine 10 mg tablet Commonly known as:  NAMENDA  
1 q am  
  
 multivitamin tablet Commonly known as:  DAILY MULTIPLE Take 1 Tab by mouth daily. OTHER Daughter states PCP gave compound cream  
  
 raNITIdine 150 mg tablet Commonly known as:  ZANTAC take 1 tablet by mouth twice a day SYSTANE (PROPYLENE GLYCOL) 0.4-0.3 % Drop Generic drug:  peg 400-propylene glycol Administer  to left eye as needed. verapamil  mg ER capsule Commonly known as:  Sonal Zhang Take 1 Cap by mouth daily. * Notice: This list has 2 medication(s) that are the same as other medications prescribed for you. Read the directions carefully, and ask your doctor or other care provider to review them with you. We Performed the Following AMB POC HEMOGLOBIN A1C [55937 CPT(R)] AMB POC URINE, MICROALBUMIN, SEMIQUANTITATIVE [34291 CPT(R)] Introducing [facility] SERVICES! Dear Nany Lomeli: 
Thank you for requesting a Trochet account. Our records indicate that you already have an active Trochet account. You can access your account anytime at [email or web address] Did you know that you can access your hospital and ER discharge instructions at any time in Trochet? You can also review all of your test results from your hospital stay or ER visit. Additional Information If you have questions, please visit the Frequently Asked Questions section of the Trochet website at [email or web address] Remember, Trochet is NOT to be used for urgent needs. For medical emergencies, dial 911. Now available from your iPhone and Android! Please provide this summary of care documentation to your next provider. Your primary care clinician is listed as Cristian Maddox. If you have any questions after today's visit, please call [phone].

## 2017-07-24 RX ORDER — CALCIUM CARBONATE 600 MG
600 TABLET ORAL DAILY
Qty: 60 TAB | Refills: 6 | Status: SHIPPED | OUTPATIENT
Start: 2017-07-24 | End: 2018-02-14 | Stop reason: SDUPTHER

## 2017-07-24 NOTE — TELEPHONE ENCOUNTER
From: Andrés Salter  To: Fidel Pepe MD  Sent: 7/14/2017 12:20 PM EDT  Subject: Medication Renewal Request    Original authorizing provider: MD Vicente Garcia.  Danielle Robles would like a refill of the following medications:  CALCIUM CARBONATE (CALCIUM 600) 1,500 (600) mg tab Fidel Pepe MD]    Preferred pharmacy: [address], [facility]:

## 2017-07-25 ENCOUNTER — TELEPHONE (OUTPATIENT)
Dept: FAMILY MEDICINE CLINIC | Age: 82
End: 2017-07-25

## 2017-07-25 NOTE — TELEPHONE ENCOUNTER
Pt needs medication refill. Pt requesting Diclofenac Sod EC 75 MG 1 by mouth 2 times a day. . Not seen on medication list.

## 2017-07-26 NOTE — TELEPHONE ENCOUNTER
Call made to Pt,Spoke with daughter, Galo Gomez, she gave two identifiers for Mrs. Eleuterio Gonzalez using name and . Noted her request for refill on Voltaren. Galo Gomez was made aware that this medication was discontinued upon discharge from the hospital in Oct. 2016. She reports that she was unaware of this and stated that she uses it for the inflammation in her knees and she would like to have it.  She was made aware that this would be send to the provider for review and verbalized understand

## 2017-07-26 NOTE — TELEPHONE ENCOUNTER
It appears this medication was supposed to be discontinued when she was discharged from the hospital in October 2016

## 2017-07-27 NOTE — TELEPHONE ENCOUNTER
Call made to Pt, spoke with daughter Zina Mackey using two identifiers for Pt name and . , Daughter carolynn information stated in N[place] note below in reference to her request for Voltaren. She verbalized understanding and stated that she would try the Tylenol to see if that helps her. I verbalized understanding and message forwarded to provider for review.

## 2017-07-27 NOTE — TELEPHONE ENCOUNTER
I believe the reason the hospital discontinued this was because of her heart disease. Anti-inflammatory medications like diclofenac can increase risk for cardiovascular events like a heart attack. We have to consider the risk/benefit of this medication, given her age and medical history. If patient/her family are willing to accept the potential increased risk for having a heart attack, we need to document this and I will renew the medication. If not, perhaps she can try Tylenol or I could refer her to ortho to see if she might benefit from an injection.

## 2017-08-25 ENCOUNTER — TELEPHONE (OUTPATIENT)
Dept: FAMILY MEDICINE CLINIC | Age: 82
End: 2017-08-25

## 2017-08-25 NOTE — TELEPHONE ENCOUNTER
Return call made to pt's daughter Monica Gifford, using two identifiers for pt, name and . Noted her request to see if she could give Mrs. Neal, Advil instead of Tylenol for pain. Spoke to Alvaro Peacock NP about her request and she stated that it is an anti inflammatory and can cause potential increased risk for heart attack and if she understands the risk then she can use it but not for a long period of time. Monica Gifford stated that she did not know that it was an anitviflammatory medication but is aware of the risk if these medications and unsure if she is willing to take that risk. I verbalized understanding and forwarded to the provider for review.

## 2017-08-25 NOTE — TELEPHONE ENCOUNTER
Patient's daughter calling in regards to medication for pain. She was told patient could take Tylenol for pain. She's wondering if patient can take Advil instead. She would like a call back.

## 2017-08-30 DIAGNOSIS — K21.00 GASTROESOPHAGEAL REFLUX DISEASE WITH ESOPHAGITIS: ICD-10-CM

## 2017-08-30 DIAGNOSIS — Z76.0 MEDICATION REFILL: ICD-10-CM

## 2017-08-30 RX ORDER — MEMANTINE HYDROCHLORIDE 10 MG/1
TABLET ORAL
Qty: 30 TAB | Refills: 5 | Status: SHIPPED | OUTPATIENT
Start: 2017-08-30 | End: 2017-10-23 | Stop reason: SDUPTHER

## 2017-08-30 NOTE — TELEPHONE ENCOUNTER
From: Olga Pandya  To: Mikayla Weaver MD  Sent: 8/30/2017 12:16 PM EDT  Subject: Medication Renewal Request    Original authorizing provider: MD Christiano Amor.  Edwige Hernadez would like a refill of the following medications:  raNITIdine (ZANTAC) 150 mg tablet Mikayla Weaver MD]  verapamil ER (VERELAN) 240 mg ER capsule Mikayla Weaver MD]  atorvastatin (LIPITOR) 80 mg tablet Mikayla Weaver MD]    Preferred pharmacy: [address]:      Medication renewals requested in this message routed to other providers:  memantine (NAMENDA) 10 mg tablet Jeff Yeh MD]

## 2017-08-30 NOTE — TELEPHONE ENCOUNTER
From: Martha Eldridge  To: Aleja Martinez MD  Sent: 8/30/2017 12:16 PM EDT  Subject: Medication Renewal Request    Original authorizing provider: MD Oz Verdugo  Arlyn Chatterjee would like a refill of the following medications:  memantine (NAMENDA) 10 mg tablet Aleja Martinez MD]    Preferred pharmacy: [address]:      Medication renewals requested in this message routed to other providers:  raNITIdine (ZANTAC) 150 mg tablet Naveed Morrow MD]  verapamil ER (VERELAN) 240 mg ER capsule Naveed Morrow MD]  atorvastatin (LIPITOR) 80 mg tablet Naveed Morrow MD]

## 2017-08-30 NOTE — TELEPHONE ENCOUNTER
Last seen on 07/17/2017 with Nabeel Giron next appointment  scheduled with Dr. Malachi Rosas on 09/11/2017

## 2017-09-01 RX ORDER — RANITIDINE 150 MG/1
TABLET, FILM COATED ORAL
Qty: 60 TAB | Refills: 0 | Status: SHIPPED | OUTPATIENT
Start: 2017-09-01 | End: 2017-10-23 | Stop reason: SDUPTHER

## 2017-09-01 RX ORDER — VERAPAMIL HYDROCHLORIDE 240 MG/1
240 CAPSULE, EXTENDED RELEASE ORAL DAILY
Qty: 30 CAP | Refills: 0 | Status: SHIPPED | OUTPATIENT
Start: 2017-09-01 | End: 2017-11-01

## 2017-09-01 RX ORDER — ATORVASTATIN CALCIUM 80 MG/1
TABLET, FILM COATED ORAL
Qty: 30 TAB | Refills: 0 | Status: SHIPPED | OUTPATIENT
Start: 2017-09-01 | End: 2018-02-19 | Stop reason: SDUPTHER

## 2017-09-01 NOTE — TELEPHONE ENCOUNTER
9/1/2017  3:17 PM    Chief Complaint   Patient presents with    Medication Refill       Noted refill request for below medications. PCP Dr Migel Moreira. Noted upcoming and last appointments. Refills completed. Requested Prescriptions     Pending Prescriptions Disp Refills    raNITIdine (ZANTAC) 150 mg tablet 60 Tab 6     Sig: take 1 tablet by mouth twice a day    verapamil ER (VERELAN) 240 mg ER capsule 30 Cap 6     Sig: Take 1 Cap by mouth daily.  atorvastatin (LIPITOR) 80 mg tablet 90 Tab 1     Sig: take 1 tablet by mouth once daily at bedtime       Lab Results   Component Value Date/Time    ALT (SGPT) 53 10/24/2016 08:00 PM    AST (SGOT) 43 10/24/2016 08:00 PM    Alk.  phosphatase 74 10/24/2016 08:00 PM    Bilirubin, total 0.3 10/24/2016 08:00 PM

## 2017-09-11 ENCOUNTER — OFFICE VISIT (OUTPATIENT)
Dept: FAMILY MEDICINE CLINIC | Age: 82
End: 2017-09-11

## 2017-09-11 VITALS
SYSTOLIC BLOOD PRESSURE: 142 MMHG | TEMPERATURE: 97.9 F | RESPIRATION RATE: 18 BRPM | HEART RATE: 80 BPM | OXYGEN SATURATION: 100 % | DIASTOLIC BLOOD PRESSURE: 80 MMHG | HEIGHT: 67 IN

## 2017-09-11 DIAGNOSIS — Z71.89 ADVANCED DIRECTIVES, COUNSELING/DISCUSSION: ICD-10-CM

## 2017-09-11 DIAGNOSIS — Z71.89 ADVANCE DIRECTIVE DISCUSSED WITH PATIENT: ICD-10-CM

## 2017-09-11 DIAGNOSIS — M19.90 ARTHRITIS: Primary | ICD-10-CM

## 2017-09-11 DIAGNOSIS — Z23 ENCOUNTER FOR IMMUNIZATION: ICD-10-CM

## 2017-09-11 RX ORDER — METHOCARBAMOL 500 MG/1
500 TABLET, FILM COATED ORAL 4 TIMES DAILY
Qty: 90 TAB | Refills: 3 | Status: SHIPPED | OUTPATIENT
Start: 2017-09-11 | End: 2017-11-01

## 2017-09-11 NOTE — PROGRESS NOTES
Pt presented today for Influenza High Dose Vaccine. Administered without complaints. Pt observed for 5 min. No adverse  reactions noted and pt left office in stable condition.

## 2017-09-11 NOTE — MR AVS SNAPSHOT
Visit Information Date & Time Provider Department Dept. Phone Encounter #  
 9/11/2017  1:00 PM Annmarie Baird MD formerly [facility] [phone] [id number] Follow-up Instructions Return in about 6 months (around 3/11/2018), or if symptoms worsen or fail to improve. Your Appointments 10/20/2017  1:45 PM  
Follow Up with Katie Mota MD  
[facility]) Appt Note: 6mon f/u  
 [address] [facility] [address] [facility] [id number] [phone]  
  
   
 [place] [phone]  
  
    
 9/11/2018 12:00 PM  
Office Visit with DIAN Knight formerly [facility] ([facility]) Appt Note: [address] [facility] [id number]  
[facility] [id number] Upcoming Health Maintenance Date Due  
 FOOT EXAM Q1 9/29/2015 EYE EXAM RETINAL OR DILATED Q1 5/11/2016 Pneumococcal 65+ High/Highest Risk (2 of 2 - PPSV23) 11/4/2016 MICROALBUMIN Q1 4/12/2017 MEDICARE YEARLY EXAM 4/13/2017 GLAUCOMA SCREENING Q2Y 5/11/2017 INFLUENZA AGE 9 TO ADULT 8/1/2017 LIPID PANEL Q1 10/25/2017 HEMOGLOBIN A1C Q6M 1/17/2018 DTaP/Tdap/Td series (2 - Td) 9/9/2026 Allergies as of 9/11/2017  Review Complete On: 9/11/2017 By: Eloise Cedeño LPN Severity Noted Reaction Type Reactions Pcn [Penicillins]  03/05/2013    Other (comments) Unknown-happened when she was very young Current Immunizations  Reviewed on 10/19/2016 Name Date Influenza High Dose Vaccine PF 9/11/2017, 9/9/2016 Influenza Vaccine 12/29/2014, 12/26/2013 Not reviewed this visit You Were Diagnosed With   
  
 Codes Comments Arthritis    -  Primary ICD-10-CM: M19.90 ICD-9-CM: 716.90 Advanced directives, counseling/discussion     ICD-10-CM: Z71.89 ICD-9-CM: V65.49  Encounter for immunization     ICD-10-CM: Q93 
 ICD-9-CM: V03.89 Vitals BP Pulse Temp Resp Height(growth percentile) SpO2  
 142/80 (BP 1 Location: Right arm, BP Patient Position: Sitting) 80 97.9 °F (36.6 °C) (Oral) 18 5' 7\" (1.702 m) 100% OB Status Smoking Status Postmenopausal Never Smoker Vitals History Preferred Pharmacy Pharmacy Name Phone [facility], [address] Your Updated Medication List  
  
   
This list is accurate as of: 9/11/17  1:59 PM.  Always use your most recent med list.  
  
  
  
  
 aspirin delayed-release 81 mg tablet  
take 1 tablet by mouth once daily  
  
 atorvastatin 80 mg tablet Commonly known as:  LIPITOR  
take 1 tablet by mouth once daily at bedtime  
  
 calcium carbonate 600 mg calcium (1,500 mg) tablet Commonly known as:  CALCIUM CARBONATE Take 1 Tab by mouth daily. take 1 tablet by mouth twice a day  
  
 ferrous sulfate 325 mg (65 mg iron) tablet Take 1 Tab by mouth two (2) times a day. glucosamine-chondroit-vit c-mn 500-400 mg capsule Take 1 Cap by mouth two (2) times a day. * glucose blood VI test strips strip Commonly known as:  ACCU-CHEK DINH PLUS TEST STRP For daily glucose checks * glucose blood VI test strips strip Commonly known as:  ACCU-CHEK DINH PLUS TEST STRP For daily glucose checks  
  
 hydrocortisone 1 % lotion Commonly known as:  ALA-DEEDEE Apply  to affected area two (2) times a day. use thin layer Lancets Misc Commonly known as:  ACCU-CHEK MULTICLIX LANCET  
100 accu-chek lancets  
  
 lisinopril 10 mg tablet Commonly known as:  PRINIVIL, ZESTRIL  
take 1 tablet by mouth once daily LUMIGAN 0.03 % ophthalmic drops Generic drug:  bimatoprost  
Administer 1 Drop to both eyes every evening. memantine 10 mg tablet Commonly known as:  NAMENDA  
1 q am  
  
 methocarbamol 500 mg tablet Commonly known as:  ROBAXIN  
 Take 1 Tab by mouth four (4) times daily. multivitamin tablet Commonly known as:  DAILY MULTIPLE Take 1 Tab by mouth daily. OTHER Daughter states PCP gave compound cream  
  
 raNITIdine 150 mg tablet Commonly known as:  ZANTAC  
take 1 tablet by mouth twice a day SYSTANE (PROPYLENE GLYCOL) 0.4-0.3 % Drop Generic drug:  peg 400-propylene glycol Administer  to left eye as needed. verapamil  mg ER capsule Commonly known as:  Geoffery Fly Take 1 Cap by mouth daily. * Notice: This list has 2 medication(s) that are the same as other medications prescribed for you. Read the directions carefully, and ask your doctor or other care provider to review them with you. Prescriptions Sent to Pharmacy Refills  
 methocarbamol (ROBAXIN) 500 mg tablet 3 Sig: Take 1 Tab by mouth four (4) times daily. Class: Normal  
 Pharmacy: [address] [place] [address] #: [phone] Route: Oral  
  
We Performed the Following ADMIN INFLUENZA VIRUS VAC [ [facility] [place]] ADVANCE CARE PLANNING FIRST 30 MINS [18728 CPT(R)] INFLUENZA VIRUS VACCINE, HIGH DOSE SEASONAL, PRESERVATIVE FREE [96414 CPT(R)] Follow-up Instructions Return in about 6 months (around 3/11/2018), or if symptoms worsen or fail to improve. Patient Instructions Medicare Wellness Visit, Female The best way to live healthy is to have a healthy lifestyle by eating a well-balanced diet, exercising regularly, limiting alcohol and stopping smoking. Regular physical exams and screening tests are another way to keep healthy. Preventive exams provided by your health care provider can find health problems before they become diseases or illnesses. Preventive services including immunizations, screening tests, monitoring and exams can help you take care of your own health. All people over age 72 should have a pneumovax  and and a prevnar shot to prevent pneumonia. These are once in a lifetime unless you and your provider decide differently. All people over 65 should have a yearly flu shot and a tetanus vaccine every 10 years. A bone mass density to screen for osteoporosis or thinning of the bones should be done every 2 years after 65. Screening for diabetes mellitus with a blood sugar test should be done every year. Glaucoma is a disease of the eye due to increased ocular pressure that can lead to blindness and it should be done every year by an eye professional. 
 
Cardiovascular screening tests that check for elevated lipids (fatty part of blood) which can lead to heart disease and strokes should be done every 5 years. Colorectal screening that evaluates for blood or polyps in your colon should be done yearly as a stool test or every five years as a flexible sigmoidoscope or every 10 years as a colonoscopy up to age 76. Breast cancer screening with a mammogram is recommended biennially  for women age 54-69. Screening for cervical cancer with a pap smear and pelvic exam is recommended for women after age 72 years every 2 years up to age 79 or when the provider and patient decide to stop. If there is a history of cervical abnormalities or other increased risk for cancer then the test is recommended yearly. Hepatitis C screening is also recommended for anyone born between [birth year] through [address]. A shingles vaccine is also recommended once in a lifetime after age 61. Your Medicare Wellness Exam is recommended annually. Here is a list of your current Health Maintenance items with a due date: 
Health Maintenance Due Topic Date Due  
 Diabetic Foot Care  09/29/2015 Rodrick Lloyd Eye Exam  05/11/2016  Pneumococcal Vaccine (2 of 2 - PPSV23) 11/04/2016  Albumin Urine Test  04/12/2017 Rodrick Lloyd Annual Well Visit  04/13/2017  Glaucoma Screening   05/11/2017  Flu Vaccine  08/01/2017 Introducing [facility]! Dear Solo Altman: 
Thank you for requesting a [employer] account. Our records indicate that you already have an active [employer] account. You can access your account anytime at [email or web address] Did you know that you can access your hospital and ER discharge instructions at any time in [employer]? You can also review all of your test results from your hospital stay or ER visit. Additional Information If you have questions, please visit the Frequently Asked Questions section of the [employer] website at [email or web address] Remember, [employer] is NOT to be used for urgent needs. For medical emergencies, dial 911. Now available from your iPhone and Android! Please provide this summary of care documentation to your next provider. Your primary care clinician is listed as Gasper Riojas. If you have any questions after today's visit, please call [phone].

## 2017-09-11 NOTE — PATIENT INSTRUCTIONS

## 2017-09-11 NOTE — PROGRESS NOTES
This is a Subsequent Medicare Annual Wellness Exam (AWV) (Performed 12 months after IPPE or effective date of Medicare Part B enrollment, Once in a lifetime)    I have reviewed the patient's medical history in detail and updated the computerized patient record. History     Past Medical History:   Diagnosis Date    Arthritis     Breast cancer ([facility] 2007    right breast (radiation)    Diabetes ([facility] [address]     diet controlled    GERD (gastroesophageal reflux disease)     Hypertension     Memory changes       Past Surgical History:   Procedure Laterality Date    HX APPENDECTOMY      HX BREAST BIOPSY      HX CHOLECYSTECTOMY       Current Outpatient Prescriptions   Medication Sig Dispense Refill    methocarbamol (ROBAXIN) 500 mg tablet Take 1 Tab by mouth four (4) times daily. 90 Tab 3    verapamil ER (VERELAN) 240 mg ER capsule Take 1 Cap by mouth daily. 30 Cap 0    memantine (NAMENDA) 10 mg tablet 1 q am 30 Tab 5    glucose blood VI test strips (ACCU-CHEK DINH PLUS TEST STRP) strip For daily glucose checks 100 Strip 11    glucose blood VI test strips (ACCU-CHEK DINH PLUS TEST STRP) strip For daily glucose checks 100 Strip 11    aspirin delayed-release 81 mg tablet take 1 tablet by mouth once daily 30 Tab 6    Lancets (ACCU-CHEK MULTICLIX LANCET) misc 100 accu-chek lancets 1 Package 11    lisinopril (PRINIVIL, ZESTRIL) 10 mg tablet take 1 tablet by mouth once daily 30 Tab 6    hydrocortisone (ALA-DEEDEE) 1 % lotion Apply  to affected area two (2) times a day. use thin layer      bimatoprost (LUMIGAN) 0.03 % ophthalmic drops Administer 1 Drop to both eyes every evening.  peg 400-propylene glycol (SYSTANE) 0.4-0.3 % drop Administer  to left eye as needed.       raNITIdine (ZANTAC) 150 mg tablet take 1 tablet by mouth twice a day 60 Tab 0    atorvastatin (LIPITOR) 80 mg tablet take 1 tablet by mouth once daily at bedtime 30 Tab 0    calcium carbonate (CALCIUM CARBONATE) 600 mg calcium (1,500 mg) tablet Take 1 Tab by mouth daily. take 1 tablet by mouth twice a day 60 Tab 6    ferrous sulfate 325 mg (65 mg iron) tablet Take 1 Tab by mouth two (2) times a day. 61 Tab 6    OTHER Daughter states PCP gave compound cream      glucosamine-chondroit-vit c-mn 500-400 mg capsule Take 1 Cap by mouth two (2) times a day. 60 Cap 3    multivitamin (DAILY MULTIPLE) tablet Take 1 Tab by mouth daily.  30 Tab 6     Allergies   Allergen Reactions    Pcn [Penicillins] Other (comments)     Unknown-happened when she was very young     Family History   Problem Relation Age of Onset    Cancer Mother     Cancer Father     Diabetes Sister     Diabetes Paternal Grandmother     Cancer Other      Social History   Substance Use Topics    Smoking status: Never Smoker    Smokeless tobacco: Never Used    Alcohol use No     Patient Active Problem List   Diagnosis Code    HTN (hypertension) I10    Diabetes mellitus ([facility] [address] E11.9    GERD (gastroesophageal reflux disease) K21.9    Frozen shoulder M75.00    Breast cancer ([facility] [address] C50.919    OA (osteoarthritis) of knee M17.10    Osteoarthritis, 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    Tear of rotator cuff M75.100    Memory loss R41.3    ACS (acute coronary syndrome) ([facility]) I24.9    Elevated troponin R74.8    NSTEMI (non-ST elevated myocardial infarction) ([facility] [address] I21.4    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, F02.80    UTI (urinary tract infection) N39.0    Dementia associated with other underlying disease F02.80    Advance directive discussed with patient Z71.89       Depression Risk Factor Screening:     PHQ over the last two weeks 7/17/2017   PHQ Not Done -   Little interest or pleasure in doing things Not at all   Feeling down, depressed or hopeless Not at all   Total Score PHQ 2 0     Alcohol Risk Factor Screening:   No drinking    Functional Ability and Level of Safety:   Hearing Loss  Mild Hearing Loss    Activities of Daily Living  The home contains: no safety equipment  Patient needs help with:  phone, transportation, shopping, preparing meals, laundry, housework, managing medications, managing money, dressing, bathing, hygiene and bathroom needs    Fall Risk  Fall Risk Assessment, last 12 mths 7/17/2017   Able to walk? No   Fall in past 12 months? -   Fall with injury? -   Number of falls in past 12 months -   Fall Risk Score -       Abuse Screen  Patient is not abused    Cognitive Screening   Evaluation of Cognitive Function:  Has your family/caregiver stated any concerns about your memory: yes  Abnormal    Patient Care Team   Patient Care Team:  Damaris Garcia MD as PCP - General (Family Practice)  Pravin Morton NP (Nurse Practitioner)  Brenden Gramajo MD as Consulting Provider (Neurology)    Assessment/Plan   Education and counseling provided:  End-of-Life planning (with patient's consent)  Pneumococcal Vaccine  Influenza Vaccine    Diagnoses and all orders for this visit:    1. Arthritis  -     methocarbamol (ROBAXIN) 500 mg tablet; Take 1 Tab by mouth four (4) times daily. 2. Advanced directives, counseling/discussion  -     ADVANCE CARE PLANNING FIRST 30 MINS    3. Encounter for immunization  -     Influenza Admin ()  -     Influenza virus vaccine (FLUZONE HIGH DOSE) PF ([id number])    4.  Advance directive discussed with patient  -     FULL CODE        Health Maintenance Due   Topic Date Due    FOOT EXAM Q1  09/29/2015    EYE EXAM RETINAL OR DILATED Q1  05/11/2016    Pneumococcal 65+ High/Highest Risk (2 of 2 - PPSV23) 11/04/2016    MICROALBUMIN Q1  04/12/2017    GLAUCOMA SCREENING Q2Y  05/11/2017

## 2017-09-11 NOTE — ACP (ADVANCE CARE PLANNING)
Advance Care Planning    Advance Care Planning (ACP) Provider Conversation Snapshot    Date of ACP Conversation: 09/14/17  Persons included in Conversation:  patient  Length of ACP Conversation in minutes:  16 minutes    Authorized Decision Maker (if patient is incapable of making informed decisions): This person is:    Other Legally Authorized Decision Maker (e.g. Next of Kin)          For Patients with Decision Making Capacity:   Values/Goals: Exploration of values, goals, and preferences if recovery is not expected, even with continued medical treatment in the event of:  Imminent death    Conversation Outcomes / Follow-Up Plan:   Recommended completion of Advance Directive form after review of ACP materials and conversation with prospective healthcare agent

## 2017-09-14 NOTE — PROGRESS NOTES
Chronic Illness Visit    Today's Date:  2017  Patient's Name: Kayley Craig   Patient's :  2/3/1933     History:     Chief Complaint   Patient presents with    Annual Wellness Visit         Leg Pain     pt in today with daughter and she states that Mrs. David Chua wakes up with leg pain and stiffness with the change of the weather       Kayley Craig is a 80 y.o. female presenting for chronic illness follow up. Diabetes/Hypertension     BP today is at goal today <130/80. Patients risk factors include: none   Home Blood Sugars are in the following range: . Denies hypoglycemic episodes   Medications regimen is as follows: lisinopril and verapamil   Last HgbA1C: 4.2017 diet controlled  Daily exercise and diet are as follows: reports minimal amount of walking. Eats plenty of fruits and vegetables   Weight is stable since the last visit. Encouraged that patient eat 3 regular meals a day. Takes the below meds regularly with no side effects. Taking daily ASA 81 mg. Last LDL:86   Last DM eye exam: 2014   Last urine microalbumin: 3/25/14     Osteoarthritis    Onset: worsened about 6 months ago. Occurs both of the knees worse on the left  Patient is sedentary/wheelchair bound due to severe pain  Denies recent   Reports more pain in the right knee compared to left. Worst after excessive walking or extended sitting. Denies acute injuries or falls. Patient usually takes NSAID for pain relief but would like something stronger  At worst pain can be 10/10. Reports that it feels stiff.       Past Medical History:   Diagnosis Date    Arthritis     Breast cancer ([address]     right breast (radiation)    Diabetes ([facility] [address]     diet controlled    GERD (gastroesophageal reflux disease)     Hypertension     Memory changes      Past Surgical History:   Procedure Laterality Date    HX APPENDECTOMY      HX BREAST BIOPSY      HX CHOLECYSTECTOMY       Social History     Social History    Marital status:      Spouse name: N/A    Number of children: N/A    Years of education: N/A     Social History Main Topics    Smoking status: Never Smoker    Smokeless tobacco: Never Used    Alcohol use No    Drug use: No    Sexual activity: No     Other Topics Concern    None     Social History Narrative     Family History   Problem Relation Age of Onset    Cancer Mother    [facility] Cancer Father     Diabetes Sister     Diabetes Paternal Grandmother     Cancer Other      Allergies   Allergen Reactions    Pcn [Penicillins] Other (comments)     Unknown-happened when she was very young       Problem List:      Patient Active Problem List   Diagnosis Code    HTN (hypertension) I10    Diabetes mellitus ([facility] [address] E11.9    GERD (gastroesophageal reflux disease) K21.9    Frozen shoulder M75.00    Breast cancer ([facility] [address] C50.919    OA (osteoarthritis) of knee M17.10    Osteoarthritis, 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    Tear of rotator cuff M75.100    Memory loss R41.3    ACS (acute coronary syndrome) ([facility] [address] I24.9    Elevated troponin R74.8    NSTEMI (non-ST elevated myocardial infarction) ([facility] [address] I21.4    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  verapamil ER (VERELAN) 240 mg ER capsule Take 1 Cap by mouth daily.     memantine (NAMENDA) 10 mg tablet 1 q am    glucose blood VI test strips (ACCU-CHEK DINH PLUS TEST STRP) strip For daily glucose checks    glucose blood VI test strips (ACCU-CHEK DINH PLUS TEST STRP) strip For daily glucose checks    aspirin delayed-release 81 mg tablet take 1 tablet by mouth once daily    Lancets (ACCU-CHEK MULTICLIX LANCET) misc 100 accu-chek lancets    lisinopril (PRINIVIL, ZESTRIL) 10 mg tablet take 1 tablet by mouth once daily    hydrocortisone (ALA-DEEDEE) 1 % lotion Apply  to affected area two (2) times a day. use thin layer    bimatoprost (LUMIGAN) 0.03 % ophthalmic drops Administer 1 Drop to both eyes every evening.  peg 400-propylene glycol (SYSTANE) 0.4-0.3 % drop Administer  to left eye as needed.  raNITIdine (ZANTAC) 150 mg tablet take 1 tablet by mouth twice a day    atorvastatin (LIPITOR) 80 mg tablet take 1 tablet by mouth once daily at bedtime    calcium carbonate (CALCIUM CARBONATE) 600 mg calcium (1,500 mg) tablet Take 1 Tab by mouth daily. take 1 tablet by mouth twice a day    ferrous sulfate 325 mg (65 mg iron) tablet Take 1 Tab by mouth two (2) times a day.  OTHER Daughter states PCP gave compound cream    glucosamine-chondroit-vit c-mn 500-400 mg capsule Take 1 Cap by mouth two (2) times a day.  multivitamin (DAILY MULTIPLE) tablet Take 1 Tab by mouth daily. No current facility-administered medications for this visit. Constitutional: positive for fatigue. negative for fevers, chills, sweats.   Respiratory: negative for cough, sputum or wheezing  Cardiovascular: negative for chest pain, chest pressure/discomfort, dyspnea  Gastrointestinal: negative for nausea, vomiting, diarrhea, constipation and abdominal pain  Musculoskeletal: positive for myalgias and arthralgias  Neurological: negative for headaches and dizziness  Behavioral/Psych: negative for anxiety and depression    Physical Assessment:   VS:    Vitals:    09/11/17 1255 09/11/17 1300   BP: 147/76 142/80   Pulse: 68 80   Resp: 18 18   Temp: 97.9 °F (36.6 °C)    TempSrc: Oral    SpO2: 100% 100%   Height: 5' 7\" (1.702 m)          Lab Results   Component Value Date/Time    Sodium 141 10/25/2016 02:01 AM    Potassium 3.8 10/25/2016 02:01 AM    Chloride 107 10/25/2016 02:01 AM    CO2 29 10/25/2016 02:01 AM    Anion gap 5 10/25/2016 02:01 AM    Glucose 71 10/25/2016 02:01 AM    BUN 19 10/25/2016 02:01 AM    Creatinine 0.69 10/25/2016 02:01 AM    BUN/Creatinine ratio 28 10/25/2016 02:01 AM    GFR est AA >60 10/25/2016 02:01 AM    GFR est non-AA >60 10/25/2016 02:01 AM    Calcium 8.4 10/25/2016 02:01 AM       General:   Well-groomed, well-nourished, in no distress, pleasant, alert, appropriate and conversant. Mouth:  Good dentition, oropharynx WNL without membranes, exudates, petechiae or ulcers  Neck:   Neck supple, no swelling, mass or tenderness, no thyromegaly  Cardiovasc:   RRR, no MRG. Pulses 2+ and symmetric at distal extremities. Pulmonary:   Lungs clear bilaterally. Normal respiratory effort. Abdomen:   Abdomen soft, NT, ND, NAB. Extremities:   No edema, no TTP bilateral calves. LEs warm and well-perfused. Neuro:   Alert and oriented, no focal deficits. No facial asymmetry noted. Skin:    Diffuse hyperpigmented plaques on the back of the neck. No erythema or edema present. Psych:  No pressured speech or abnormal thought content        Assessment/Plan & Orders:       1. Arthritis    2. Advanced directives, counseling/discussion    3. Encounter for immunization    4. Advance directive discussed with patient        Orders Placed This Encounter    ADVANCE CARE PLANNING FIRST 30 MINS    Influenza virus vaccine (FLUZONE HIGH DOSE) PF ([id number])    FULL CODE    Influenza Admin ()    methocarbamol (ROBAXIN) 500 mg tablet       1. Hypertension BP at goal continue w/ current meds    2. Diabetes last HgbA1c 4.9 continue with diet controlled no meds     3.  GERD continue ranitidine      Follow up in 2-3 months     Christina Franklin MD  Family Medicine  9/11/2017  1:00 PM

## 2017-09-20 DIAGNOSIS — K21.00 GASTROESOPHAGEAL REFLUX DISEASE WITH ESOPHAGITIS: ICD-10-CM

## 2017-09-20 DIAGNOSIS — Z76.0 MEDICATION REFILL: ICD-10-CM

## 2017-09-21 RX ORDER — RANITIDINE 150 MG/1
TABLET, FILM COATED ORAL
Qty: 60 TAB | Refills: 6 | Status: SHIPPED | OUTPATIENT
Start: 2017-09-21 | End: 2017-11-27 | Stop reason: SDUPTHER

## 2017-10-15 DIAGNOSIS — Z76.0 MEDICATION REFILL: ICD-10-CM

## 2017-10-20 RX ORDER — LISINOPRIL 10 MG/1
TABLET ORAL
Qty: 30 TAB | Refills: 3 | Status: SHIPPED | OUTPATIENT
Start: 2017-10-20 | End: 2018-03-16 | Stop reason: SDUPTHER

## 2017-10-20 RX ORDER — VERAPAMIL HYDROCHLORIDE 240 MG/1
CAPSULE, EXTENDED RELEASE ORAL
Qty: 30 CAP | Refills: 6 | Status: SHIPPED | OUTPATIENT
Start: 2017-10-20 | End: 2017-10-23 | Stop reason: SDUPTHER

## 2017-10-23 ENCOUNTER — TELEPHONE (OUTPATIENT)
Dept: FAMILY MEDICINE CLINIC | Age: 82
End: 2017-10-23

## 2017-10-23 ENCOUNTER — OFFICE VISIT (OUTPATIENT)
Dept: NEUROLOGY | Age: 82
End: 2017-10-23

## 2017-10-23 VITALS
DIASTOLIC BLOOD PRESSURE: 50 MMHG | TEMPERATURE: 96.4 F | HEART RATE: 52 BPM | OXYGEN SATURATION: 100 % | HEIGHT: 67 IN | RESPIRATION RATE: 12 BRPM | SYSTOLIC BLOOD PRESSURE: 90 MMHG

## 2017-10-23 DIAGNOSIS — R26.9 GAIT DISTURBANCE: ICD-10-CM

## 2017-10-23 DIAGNOSIS — F02.818 DEMENTIA ASSOCIATED WITH OTHER UNDERLYING DISEASE WITH BEHAVIORAL DISTURBANCE: ICD-10-CM

## 2017-10-23 DIAGNOSIS — I67.9 SMALL VESSEL DISEASE, CEREBROVASCULAR: ICD-10-CM

## 2017-10-23 DIAGNOSIS — R41.3 MEMORY LOSS: ICD-10-CM

## 2017-10-23 DIAGNOSIS — I95.2 HYPOTENSION DUE TO DRUGS: Primary | ICD-10-CM

## 2017-10-23 DIAGNOSIS — I10 ESSENTIAL HYPERTENSION: ICD-10-CM

## 2017-10-23 RX ORDER — MEMANTINE HYDROCHLORIDE 10 MG/1
TABLET ORAL
Qty: 30 TAB | Refills: 5 | Status: SHIPPED | OUTPATIENT
Start: 2017-10-23 | End: 2018-04-02 | Stop reason: SDUPTHER

## 2017-10-23 RX ORDER — LATANOPROST 50 UG/ML
1 SOLUTION/ DROPS OPHTHALMIC
COMMUNITY

## 2017-10-23 NOTE — MR AVS SNAPSHOT
Visit Information Date & Time Provider Department Dept. Phone Encounter #  
 10/23/2017 11:00 AM Radha Patient, MD [facility] [phone] [id number] Follow-up Instructions Return in about 6 months (around 4/23/2018). Follow-up and Disposition History Your Appointments 4/23/2018 11:00 AM  
Follow Up with Radha Oliva MD  
[facility]) Appt Note: 6mon f/u; pt daughter aware to provide Mihir Perry for this visit [address] [facility] [id number]  
[phone]  
  
   
 [place] [id number]  
  
    
 9/11/2018 12:00 PM  
Office Visit with DIAN Patel [facility] ([facility]) Appt Note: [address] [facility] [address]  
[facility] [id number] Upcoming Health Maintenance Date Due  
 FOOT EXAM Q1 9/29/2015 EYE EXAM RETINAL OR DILATED Q1 5/11/2016 Pneumococcal 65+ High/Highest Risk (2 of 2 - PPSV23) 11/4/2016 MICROALBUMIN Q1 4/12/2017 GLAUCOMA SCREENING Q2Y 5/11/2017 LIPID PANEL Q1 10/25/2017 HEMOGLOBIN A1C Q6M 1/17/2018 MEDICARE YEARLY EXAM 9/12/2018 DTaP/Tdap/Td series (2 - Td) 9/9/2026 Allergies as of 10/23/2017  Review Complete On: 10/23/2017 By: Suyapa Rivas LPN Severity Noted Reaction Type Reactions Pcn [Penicillins]  03/05/2013    Other (comments) Unknown-happened when she was very young Current Immunizations  Reviewed on 10/19/2016 Name Date Influenza High Dose Vaccine PF 9/11/2017, 9/9/2016 Influenza Vaccine 12/29/2014, 12/26/2013 Not reviewed this visit You Were Diagnosed With   
  
 Codes Comments Hypotension due to drugs    -  Primary ICD-10-CM: I95.2 ICD-9-CM: 458.8, E947.9  Dementia associated with other underlying disease with behavioral disturbance     ICD-10-CM: F02.81 ICD-9-CM: 294.8, 294.11 Memory loss     ICD-10-CM: R41.3 ICD-9-CM: 780.93 Gait disturbance     ICD-10-CM: R26.9 ICD-9-CM: 781.2 Small vessel disease, cerebrovascular     ICD-10-CM: I67.9 ICD-9-CM: 437.9 Essential hypertension     ICD-10-CM: I10 
ICD-9-CM: 401.9 Vitals BP Pulse Temp Resp Height(growth percentile) SpO2  
 90/50 (BP 1 Location: Right arm, BP Patient Position: Sitting) (!) 52 96.4 °F (35.8 °C) (Temporal) 12 5' 7\" (1.702 m) 100% OB Status Smoking Status Postmenopausal Never Smoker Vitals History Preferred Pharmacy Pharmacy Name Phone [address] [facility] Your Updated Medication List  
  
   
This list is accurate as of: 10/23/17 11:34 AM.  Always use your most recent med list.  
  
  
  
  
 ALPHAGAN P 0.1 % ophthalmic solution Generic drug:  brimonidine  
two (2) times a day. aspirin delayed-release 81 mg tablet  
take 1 tablet by mouth once daily  
  
 atorvastatin 80 mg tablet Commonly known as:  LIPITOR  
take 1 tablet by mouth once daily at bedtime  
  
 calcium carbonate 600 mg calcium (1,500 mg) tablet Commonly known as:  CALCIUM CARBONATE Take 1 Tab by mouth daily. take 1 tablet by mouth twice a day  
  
 ferrous sulfate 325 mg (65 mg iron) tablet Take 1 Tab by mouth two (2) times a day. glucosamine-chondroit-vit c-mn 500-400 mg capsule Take 1 Cap by mouth two (2) times a day. * glucose blood VI test strips strip Commonly known as:  ACCU-CHEK DINH PLUS TEST STRP For daily glucose checks * glucose blood VI test strips strip Commonly known as:  ACCU-CHEK DINH PLUS TEST STRP For daily glucose checks  
  
 hydrocortisone 1 % lotion Commonly known as:  ALA-DEEDEE Apply  to affected area two (2) times a day. use thin layer Lancets Misc Commonly known as:  ACCU-CHEK MULTICLIX LANCET  
100 accu-chek lancets  
  
 latanoprost 0.005 % ophthalmic solution Commonly known as:  Leopoldo Cower Administer 1 Drop to both eyes nightly. lisinopril 10 mg tablet Commonly known as:  PRINIVIL, ZESTRIL  
take 1 tablet by mouth once daily  
  
 memantine 10 mg tablet Commonly known as:  NAMENDA  
1 q am  
  
 methocarbamol 500 mg tablet Commonly known as:  ROBAXIN Take 1 Tab by mouth four (4) times daily. multivitamin tablet Commonly known as:  DAILY MULTIPLE Take 1 Tab by mouth daily. OTHER Daughter states PCP gave compound cream  
  
 raNITIdine 150 mg tablet Commonly known as:  ZANTAC  
take 1 tablet by mouth twice a day SYSTANE (PROPYLENE GLYCOL) 0.4-0.3 % Drop Generic drug:  peg 400-propylene glycol Administer  to left eye as needed. verapamil  mg ER capsule Commonly known as:  Cherylyn Zach Take 1 Cap by mouth daily. * Notice: This list has 2 medication(s) that are the same as other medications prescribed for you. Read the directions carefully, and ask your doctor or other care provider to review them with you. Prescriptions Sent to Pharmacy Refills  
 memantine (NAMENDA) 10 mg tablet 5 Si q am  
 Class: Normal  
 Pharmacy: [address] [place] [address], [address] [place] [address] #: [phone] Follow-up Instructions Return in about 6 months (around 2018). Patient Instructions Patient to hold blood pressure medications until contact pcp Introducing [facility]! Dear Archana Souza: 
Thank you for requesting a [employer] account. Our records indicate that you already have an active [employer] account. You can access your account anytime at [email or web address] Did you know that you can access your hospital and ER discharge instructions at any time in [employer]?   You can also review all of your test results from your hospital stay or ER visit. Additional Information If you have questions, please visit the Frequently Asked Questions section of the [employer] website at [email or web address] Remember, [employer] is NOT to be used for urgent needs. For medical emergencies, dial 911. Now available from your iPhone and Android! Please provide this summary of care documentation to your next provider. Your primary care clinician is listed as Rigo Krishnamurthy. If you have any questions after today's visit, please call [phone].

## 2017-10-23 NOTE — PROGRESS NOTES
[facility]             [address] [place] [address] [address], [address]    10/23/2017    HPI:  Yaa Dad is a 80 y.o., right handed,   female, who presents with memory loss. She is accompanied by daughter, who reports gradual onset  Date 3 years ago, much worse over the past 2 years. Family noted difficulty with placement of items difficulty remember to turn burn resolves. There is been no dressing apraxia. Patient is essentially confused. If she naps during the day at times. Patient and daughter deny any recent head trauma, a. Patient also has history of diabetes, hypertension, and breast cancer. There is no family history of dementia. There is no personal history of strokes. Patient returns  In follow up .daughter reports she can no longer call her by name. She is not walking. She does have occasional foot numbness in the mornings. She denies any significant pain both she and daughter are happy with medication and do not want to start another medication at this time. Patient noted to have  hypotension this a.m. She reports not feeling right but denies dizziness or chest pain daughter reveals that home health nurse noted hypotension last week. Patient daughter instructed to call primary care physician before administration of further blood pressure medication. Has occasional agitation when being changed at night  Current Outpatient Prescriptions   Medication Sig Dispense Refill    latanoprost (XALATAN) 0.005 % ophthalmic solution Administer 1 Drop to both eyes nightly.  brimonidine (ALPHAGAN P) 0.1 % ophthalmic solution two (2) times a day.  lisinopril (PRINIVIL, ZESTRIL) 10 mg tablet take 1 tablet by mouth once daily 30 Tab 3    raNITIdine (ZANTAC) 150 mg tablet take 1 tablet by mouth twice a day 60 Tab 6    methocarbamol (ROBAXIN) 500 mg tablet Take 1 Tab by mouth four (4) times daily.  90 Tab 3    verapamil ER (VERELAN) 240 mg ER capsule Take 1 Cap by mouth daily. 30 Cap 0    atorvastatin (LIPITOR) 80 mg tablet take 1 tablet by mouth once daily at bedtime 30 Tab 0    memantine (NAMENDA) 10 mg tablet 1 q am 30 Tab 5    calcium carbonate (CALCIUM CARBONATE) 600 mg calcium (1,500 mg) tablet Take 1 Tab by mouth daily. take 1 tablet by mouth twice a day 60 Tab 6    glucose blood VI test strips (ACCU-CHEK DINH PLUS TEST STRP) strip For daily glucose checks 100 Strip 11    glucose blood VI test strips (ACCU-CHEK DINH PLUS TEST STRP) strip For daily glucose checks 100 Strip 11    ferrous sulfate 325 mg (65 mg iron) tablet Take 1 Tab by mouth two (2) times a day. 60 Tab 6    aspirin delayed-release 81 mg tablet take 1 tablet by mouth once daily 30 Tab 6    Lancets (ACCU-CHEK MULTICLIX LANCET) misc 100 accu-chek lancets 1 Package 11    OTHER Daughter states PCP gave compound cream      multivitamin (DAILY MULTIPLE) tablet Take 1 Tab by mouth daily. 30 Tab 6    hydrocortisone (ALA-DEEDEE) 1 % lotion Apply  to affected area two (2) times a day. use thin layer      peg 400-propylene glycol (SYSTANE) 0.4-0.3 % drop Administer  to left eye as needed.  glucosamine-chondroit-vit c-mn 500-400 mg capsule Take 1 Cap by mouth two (2) times a day.  61 Cap 3       Past Medical History:   Diagnosis Date    Arthritis     Breast cancer ([facility] [address] 2007    right breast (radiation)    Diabetes ([facility] [address]     diet controlled    GERD (gastroesophageal reflux disease)     Hypertension     Memory changes        Past Surgical History:   Procedure Laterality Date    HX APPENDECTOMY      HX BREAST BIOPSY      HX CHOLECYSTECTOMY       Family History   Problem Relation Age of Onset    Cancer Mother     Cancer Father     Diabetes Sister     Diabetes Paternal Grandmother     Cancer Other      Allergies   Allergen Reactions    Pcn [Penicillins] Other (comments)     Unknown-happened when she was very young       Review of Systems:   Review of Systems - History obtained from child and the patient  General ROS: positive for  - malaise  Psychological ROS: positive for - memory difficulties  ENT ROS: positive for - sore throat  Hematological and Lymphatic ROS: negative  Endocrine ROS: negative  Respiratory ROS: no cough, shortness of breath, or wheezing  Cardiovascular ROS: no chest pain or dyspnea on exertion  Gastrointestinal ROS: positive for - abdominal pain and heartburn  Genito-Urinary ROS: negative  Musculoskeletal ROS: positive for - gait disturbance, joint pain and pain in knee - bilateral and shoulder  Neurological ROS: positive for - gait disturbance, impaired coordination/balance and memory loss  Dermatological ROS: negative    PHYSICAL EXAMINATION:    Visit Vitals    BP 90/50 (BP 1 Location: Right arm, BP Patient Position: Sitting)    Pulse (!) 52    Temp 96.4 °F (35.8 °C) (Temporal)    Resp 12    Ht 5' 7\" (1.702 m)    SpO2 100%     General:  Well defined, nourished, and groomed individual in no acute distress. Neck: Supple, nontender, thyroid within normal limits, no JVD, no bruits, no pain with resistance to active range of motion. Poor dentition  Heart: Regular rate and rhythm, no murmurs, rub, or gallop. Normal S1S2. Lungs:  Clear to auscultation bilaterally with equal chest expansion, no cough, no wheeze  Musculoskeletal:  Extremities revealed no edema and had full range of motion of joints. Psych:  Good mood and normal affect    NEUROLOGICAL EXAMINATION:     Mental Status:   Alert and oriented to person, not place,or time with  Poor recent and remote memory  Attention span and concentration are normal. Speech is sparse with a poor  fund of knowledge. Marked apraxia  Cranial Nerves:    II, III, IV, VI:  Visual acuity grossly intact. Visual fields are normal.    Pupils are equal, round, and reactive to light and accommodation. Extra-ocular movements are full and fluid. , no ptosis or nystagmus.    Gait and Station:    No muscle wasting or fasiculations noted. Motor tone is normal. Strength is consistent with effort. All 4 extremities. reviewed   eeg as normal, ct scan no acute infarct, evidence of small vessel disease      Assessment and Plan: Jim Espinoza is a 80 y.o. right handed female whose history and physical are consistent with dementia. Jim Espinoza who has risk factors including hypertension,diabetes, age, history of breast cancer    Diagnoses and all orders for this visit:    1. Hypotension due to drugs    2. Dementia associated with other underlying disease with behavioral disturbance    3. Memory loss    4. Gait disturbance    5. Small vessel disease, cerebrovascular    6. Essential hypertension      Follow-up Disposition:  Return in about 6 months (around 4/23/2018). Reviewed  Family notes re increased confusion on higher dose of Namenda will stay a 1 daily  Reviewed workup in Connect /care including notes and labs  I spent 40 minutes with the patient in face-to-face consultation, of which greater than 50% was spent in counseling and coordination of care as described above.

## 2017-10-24 ENCOUNTER — TELEPHONE (OUTPATIENT)
Dept: FAMILY MEDICINE CLINIC | Age: 82
End: 2017-10-24

## 2017-10-24 NOTE — TELEPHONE ENCOUNTER
Call made to the pt, spoke with her daughter Frank Bartlett using two identifiers for the pt name and . Luis Caba Noted the call from the [facility] nurse. Frank Bartlett stated that her mother was doing better today, she is awake and alert and has eaten her breakfast and has not had any issues this morning. She stated that she doesn't have a blood pressure cuff to take her blood pressure but will ask the aide when she comes in. She also stated that she gave her her Verapamil  and Lisinopril together yesterday and she usually doesn't and that could have caused her BP to be low at that time. I verbalized understanding. Call then made to [address] [place] [address], spoke with Lesvia Macdonald, who took the message about the pt and stated that she would send the message to the nurse Gricelda Landers and have her to call me back. I verbalized understanding and the message forwarded to the provider for review.

## 2017-10-30 ENCOUNTER — HOSPITAL ENCOUNTER (INPATIENT)
Age: 82
LOS: 2 days | Discharge: HOME HEALTH | DRG: 690 | End: 2017-11-01
Attending: EMERGENCY MEDICINE | Admitting: FAMILY MEDICINE
Payer: MEDICARE

## 2017-10-30 ENCOUNTER — APPOINTMENT (OUTPATIENT)
Dept: GENERAL RADIOLOGY | Age: 82
DRG: 690 | End: 2017-10-30
Attending: EMERGENCY MEDICINE
Payer: MEDICARE

## 2017-10-30 DIAGNOSIS — R00.1 BRADYCARDIA: Primary | ICD-10-CM

## 2017-10-30 DIAGNOSIS — I95.9 HYPOTENSION, UNSPECIFIED HYPOTENSION TYPE: ICD-10-CM

## 2017-10-30 LAB
ALBUMIN SERPL-MCNC: 2.8 G/DL (ref 3.4–5)
ALBUMIN/GLOB SERPL: 0.6 {RATIO} (ref 0.8–1.7)
ALP SERPL-CCNC: 69 U/L (ref 45–117)
ALT SERPL-CCNC: 21 U/L (ref 13–56)
ANION GAP SERPL CALC-SCNC: 7 MMOL/L (ref 3–18)
APPEARANCE UR: ABNORMAL
AST SERPL-CCNC: 19 U/L (ref 15–37)
ATRIAL RATE: 48 BPM
BACTERIA URNS QL MICRO: ABNORMAL /HPF
BASOPHILS # BLD: 0 K/UL (ref 0–0.06)
BASOPHILS NFR BLD: 1 % (ref 0–2)
BILIRUB SERPL-MCNC: 0.2 MG/DL (ref 0.2–1)
BILIRUB UR QL: NEGATIVE
BNP SERPL-MCNC: 538 PG/ML (ref 0–1800)
BUN SERPL-MCNC: 26 MG/DL (ref 7–18)
BUN/CREAT SERPL: 34 (ref 12–20)
CALCIUM SERPL-MCNC: 8.8 MG/DL (ref 8.5–10.1)
CALCULATED P AXIS, ECG09: -4 DEGREES
CALCULATED R AXIS, ECG10: -59 DEGREES
CALCULATED T AXIS, ECG11: -71 DEGREES
CHLORIDE SERPL-SCNC: 106 MMOL/L (ref 100–108)
CK MB CFR SERPL CALC: 1.8 % (ref 0–4)
CK MB CFR SERPL CALC: 2 % (ref 0–4)
CK MB CFR SERPL CALC: 3.2 % (ref 0–4)
CK MB SERPL-MCNC: 2 NG/ML (ref 5–25)
CK MB SERPL-MCNC: 2.2 NG/ML (ref 5–25)
CK MB SERPL-MCNC: 5.5 NG/ML (ref 5–25)
CK SERPL-CCNC: 108 U/L (ref 26–192)
CK SERPL-CCNC: 110 U/L (ref 26–192)
CK SERPL-CCNC: 172 U/L (ref 26–192)
CO2 SERPL-SCNC: 26 MMOL/L (ref 21–32)
COLOR UR: ABNORMAL
CREAT SERPL-MCNC: 0.77 MG/DL (ref 0.6–1.3)
DIAGNOSIS, 93000: NORMAL
DIFFERENTIAL METHOD BLD: ABNORMAL
EOSINOPHIL # BLD: 0.1 K/UL (ref 0–0.4)
EOSINOPHIL NFR BLD: 1 % (ref 0–5)
EPITH CASTS URNS QL MICRO: ABNORMAL /LPF (ref 0–5)
ERYTHROCYTE [DISTWIDTH] IN BLOOD BY AUTOMATED COUNT: 13.3 % (ref 11.6–14.5)
GLOBULIN SER CALC-MCNC: 4.5 G/DL (ref 2–4)
GLUCOSE BLD STRIP.AUTO-MCNC: 81 MG/DL (ref 70–110)
GLUCOSE SERPL-MCNC: 100 MG/DL (ref 74–99)
GLUCOSE UR STRIP.AUTO-MCNC: 100 MG/DL
HCT VFR BLD AUTO: 32.3 % (ref 35–45)
HGB BLD-MCNC: 10.6 G/DL (ref 12–16)
HGB UR QL STRIP: NEGATIVE
HYALINE CASTS URNS QL MICRO: ABNORMAL /LPF (ref 0–2)
INR PPP: 1 (ref 0.8–1.2)
KETONES UR QL STRIP.AUTO: ABNORMAL MG/DL
LACTATE BLD-SCNC: 1.7 MMOL/L (ref 0.4–2)
LACTATE SERPL-SCNC: 1.1 MMOL/L (ref 0.4–2)
LEUKOCYTE ESTERASE UR QL STRIP.AUTO: ABNORMAL
LYMPHOCYTES # BLD: 1.1 K/UL (ref 0.9–3.6)
LYMPHOCYTES NFR BLD: 30 % (ref 21–52)
MCH RBC QN AUTO: 30.8 PG (ref 24–34)
MCHC RBC AUTO-ENTMCNC: 32.8 G/DL (ref 31–37)
MCV RBC AUTO: 93.9 FL (ref 74–97)
MONOCYTES # BLD: 0.4 K/UL (ref 0.05–1.2)
MONOCYTES NFR BLD: 11 % (ref 3–10)
MUCOUS THREADS URNS QL MICRO: ABNORMAL /LPF
NEUTS SEG # BLD: 2 K/UL (ref 1.8–8)
NEUTS SEG NFR BLD: 57 % (ref 40–73)
NITRITE UR QL STRIP.AUTO: NEGATIVE
P-R INTERVAL, ECG05: 180 MS
PH UR STRIP: 5 [PH] (ref 5–8)
PLATELET # BLD AUTO: 208 K/UL (ref 135–420)
PMV BLD AUTO: 10.7 FL (ref 9.2–11.8)
POTASSIUM SERPL-SCNC: 4.1 MMOL/L (ref 3.5–5.5)
PROT SERPL-MCNC: 7.3 G/DL (ref 6.4–8.2)
PROT UR STRIP-MCNC: 100 MG/DL
PROTHROMBIN TIME: 13 SEC (ref 11.5–15.2)
Q-T INTERVAL, ECG07: 516 MS
QRS DURATION, ECG06: 138 MS
QTC CALCULATION (BEZET), ECG08: 460 MS
RBC # BLD AUTO: 3.44 M/UL (ref 4.2–5.3)
RBC #/AREA URNS HPF: NEGATIVE /HPF (ref 0–5)
SODIUM SERPL-SCNC: 139 MMOL/L (ref 136–145)
SP GR UR REFRACTOMETRY: 1.03 (ref 1–1.03)
TROPONIN I SERPL-MCNC: <0.02 NG/ML (ref 0–0.04)
TSH SERPL DL<=0.05 MIU/L-ACNC: 1.39 UIU/ML (ref 0.36–3.74)
UROBILINOGEN UR QL STRIP.AUTO: 1 EU/DL (ref 0.2–1)
VENTRICULAR RATE, ECG03: 48 BPM
WBC # BLD AUTO: 3.6 K/UL (ref 4.6–13.2)
WBC URNS QL MICRO: ABNORMAL /HPF (ref 0–4)

## 2017-10-30 PROCEDURE — 87040 BLOOD CULTURE FOR BACTERIA: CPT | Performed by: EMERGENCY MEDICINE

## 2017-10-30 PROCEDURE — 84484 ASSAY OF TROPONIN QUANT: CPT | Performed by: EMERGENCY MEDICINE

## 2017-10-30 PROCEDURE — 81001 URINALYSIS AUTO W/SCOPE: CPT | Performed by: EMERGENCY MEDICINE

## 2017-10-30 PROCEDURE — 83605 ASSAY OF LACTIC ACID: CPT

## 2017-10-30 PROCEDURE — 71010 XR CHEST PORT: CPT

## 2017-10-30 PROCEDURE — 85610 PROTHROMBIN TIME: CPT | Performed by: EMERGENCY MEDICINE

## 2017-10-30 PROCEDURE — 80053 COMPREHEN METABOLIC PANEL: CPT | Performed by: EMERGENCY MEDICINE

## 2017-10-30 PROCEDURE — 99285 EMERGENCY DEPT VISIT HI MDM: CPT

## 2017-10-30 PROCEDURE — 65660000000 HC RM CCU STEPDOWN

## 2017-10-30 PROCEDURE — 85025 COMPLETE CBC W/AUTO DIFF WBC: CPT | Performed by: EMERGENCY MEDICINE

## 2017-10-30 PROCEDURE — 74011250636 HC RX REV CODE- 250/636: Performed by: EMERGENCY MEDICINE

## 2017-10-30 PROCEDURE — 93005 ELECTROCARDIOGRAM TRACING: CPT

## 2017-10-30 PROCEDURE — 82962 GLUCOSE BLOOD TEST: CPT

## 2017-10-30 PROCEDURE — 36415 COLL VENOUS BLD VENIPUNCTURE: CPT | Performed by: EMERGENCY MEDICINE

## 2017-10-30 PROCEDURE — 96365 THER/PROPH/DIAG IV INF INIT: CPT

## 2017-10-30 PROCEDURE — 84443 ASSAY THYROID STIM HORMONE: CPT | Performed by: EMERGENCY MEDICINE

## 2017-10-30 PROCEDURE — 83605 ASSAY OF LACTIC ACID: CPT | Performed by: EMERGENCY MEDICINE

## 2017-10-30 PROCEDURE — 74011000258 HC RX REV CODE- 258: Performed by: EMERGENCY MEDICINE

## 2017-10-30 PROCEDURE — 96361 HYDRATE IV INFUSION ADD-ON: CPT

## 2017-10-30 PROCEDURE — 83880 ASSAY OF NATRIURETIC PEPTIDE: CPT | Performed by: EMERGENCY MEDICINE

## 2017-10-30 RX ORDER — THERA TABS 400 MCG
1 TAB ORAL DAILY
Status: DISCONTINUED | OUTPATIENT
Start: 2017-10-31 | End: 2017-11-01 | Stop reason: HOSPADM

## 2017-10-30 RX ORDER — LATANOPROST 50 UG/ML
1 SOLUTION/ DROPS OPHTHALMIC
Status: DISCONTINUED | OUTPATIENT
Start: 2017-10-30 | End: 2017-11-01 | Stop reason: HOSPADM

## 2017-10-30 RX ORDER — RANITIDINE 150 MG/1
150 TABLET, FILM COATED ORAL 2 TIMES DAILY
Status: DISCONTINUED | OUTPATIENT
Start: 2017-10-31 | End: 2017-10-31 | Stop reason: CLARIF

## 2017-10-30 RX ORDER — LISINOPRIL 10 MG/1
10 TABLET ORAL DAILY
Status: DISCONTINUED | OUTPATIENT
Start: 2017-10-31 | End: 2017-11-01 | Stop reason: HOSPADM

## 2017-10-30 RX ORDER — VERAPAMIL HYDROCHLORIDE 240 MG/1
240 TABLET, FILM COATED, EXTENDED RELEASE ORAL DAILY
Status: DISCONTINUED | OUTPATIENT
Start: 2017-10-31 | End: 2017-10-31

## 2017-10-30 RX ORDER — POLYVINYL ALCOHOL 14 MG/ML
1 SOLUTION/ DROPS OPHTHALMIC
Status: DISCONTINUED | OUTPATIENT
Start: 2017-10-31 | End: 2017-11-01 | Stop reason: HOSPADM

## 2017-10-30 RX ORDER — SODIUM CHLORIDE 0.9 % (FLUSH) 0.9 %
5-10 SYRINGE (ML) INJECTION AS NEEDED
Status: DISCONTINUED | OUTPATIENT
Start: 2017-10-30 | End: 2017-11-01 | Stop reason: HOSPADM

## 2017-10-30 RX ORDER — ASPIRIN 81 MG/1
81 TABLET ORAL DAILY
Status: DISCONTINUED | OUTPATIENT
Start: 2017-10-31 | End: 2017-11-01 | Stop reason: HOSPADM

## 2017-10-30 RX ORDER — MEMANTINE HYDROCHLORIDE 10 MG/1
10 TABLET ORAL DAILY
Status: DISCONTINUED | OUTPATIENT
Start: 2017-10-31 | End: 2017-11-01 | Stop reason: HOSPADM

## 2017-10-30 RX ORDER — ATORVASTATIN CALCIUM 40 MG/1
80 TABLET, FILM COATED ORAL DAILY
Status: DISCONTINUED | OUTPATIENT
Start: 2017-10-31 | End: 2017-11-01 | Stop reason: HOSPADM

## 2017-10-30 RX ORDER — CALCIUM CARBONATE 600 MG
600 TABLET ORAL DAILY
Status: DISCONTINUED | OUTPATIENT
Start: 2017-10-31 | End: 2017-11-01 | Stop reason: HOSPADM

## 2017-10-30 RX ORDER — LANOLIN ALCOHOL/MO/W.PET/CERES
325 CREAM (GRAM) TOPICAL 2 TIMES DAILY
Status: DISCONTINUED | OUTPATIENT
Start: 2017-10-31 | End: 2017-11-01 | Stop reason: HOSPADM

## 2017-10-30 RX ORDER — NOREPINEPHRINE BITARTRATE/D5W 8 MG/250ML
2-16 PLASTIC BAG, INJECTION (ML) INTRAVENOUS
Status: DISCONTINUED | OUTPATIENT
Start: 2017-10-30 | End: 2017-10-30

## 2017-10-30 RX ORDER — BISMUTH SUBSALICYLATE 262 MG
1 TABLET,CHEWABLE ORAL DAILY
Status: DISCONTINUED | OUTPATIENT
Start: 2017-10-31 | End: 2017-10-30 | Stop reason: RX

## 2017-10-30 RX ADMIN — SODIUM CHLORIDE 1950 ML: 900 INJECTION, SOLUTION INTRAVENOUS at 13:40

## 2017-10-30 RX ADMIN — CEFTRIAXONE 1 G: 1 INJECTION, POWDER, FOR SOLUTION INTRAMUSCULAR; INTRAVENOUS at 16:20

## 2017-10-30 NOTE — ED PROVIDER NOTES
HPI Comments: 1:23 PM  Villa Moyer is a 80 y.o. female with a PMHx of HTN, DM, dementia (oriented only to self) who presents to the ED from home via EMS for gradually worsening AMS x 1 week, worsening today. Per EMS report in field, pt was unresponsive with no radial pulse, bradycardic in the 40s, and BP 98/40. On arrival to the ED, EMS reports pt is responsive to painful stimuli. Relative at bedside explains pt was frequently sleepy last week and has worsening confusion today. 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 \" Pt is not followed by a cardiologist. Relative denies any kidney hx. No fever/chills. No other acute symptoms or complaints were noted. PCP: Luis Carlos Beard MD      The history is provided by the EMS personnel and a relative. The history is limited by the condition of the patient. Past Medical History:   Diagnosis Date    Arthritis     Breast cancer ([facility] [address] 2007    right breast (radiation)    Diabetes ([facility] [address]     diet controlled    GERD (gastroesophageal reflux disease)     Hypertension     Memory changes        Past Surgical History:   Procedure Laterality Date    HX APPENDECTOMY      HX BREAST BIOPSY      HX CHOLECYSTECTOMY           Family History:   Problem Relation Age of Onset    Cancer Mother     Cancer Father     Diabetes Sister     Diabetes Paternal Grandmother     Cancer Other        Social History     Social History    Marital status:      Spouse name: N/A    Number of children: N/A    Years of education: N/A     Occupational History    Not on file.      Social History Main Topics    Smoking status: Never Smoker    Smokeless tobacco: Never Used    Alcohol use No    Drug use: No    Sexual activity: No     Other Topics Concern    Not on file     Social History Narrative         ALLERGIES: Pcn [penicillins]    Review of Systems   Unable to perform ROS: Mental status change     Patient Vitals for the past 12 hrs:   Temp Pulse Resp BP SpO2   10/30/17 1740 - (!) 47 12 150/56 99 %   10/30/17 1645 - (!) 48 18 113/49 99 %   10/30/17 1630 - (!) 48 13 115/59 98 %   10/30/17 1615 - (!) 49 13 (!) 117/91 100 %   10/30/17 1600 - (!) 50 17 109/55 100 %   10/30/17 1545 - (!) 47 14 113/64 -   10/30/17 1530 - (!) 46 14 104/82 100 %   10/30/17 1500 - (!) 48 15 113/57 100 %   10/30/17 1430 - (!) 49 15 115/50 100 %   10/30/17 1415 - (!) 49 17 122/51 100 %   10/30/17 1400 - (!) 49 16 121/55 100 %   10/30/17 1345 - (!) 48 17 111/56 100 %   10/30/17 1336 98.7 °F (37.1 °C) (!) 57 10 (!) 76/48 100 %   10/30/17 1330 - (!) 51 20 95/66 100 %     Physical Exam   Constitutional: She appears well-developed and well-nourished. HENT:   Head: Normocephalic and atraumatic. Eyes: Pupils are equal, round, and reactive to light. Cardiovascular:   Bradycardia     Pulmonary/Chest: Breath sounds normal.   Abdominal: She exhibits no distension. There is no tenderness. Musculoskeletal: Normal range of motion. Neurological:   Baseline dementia; awake on arrival   Skin: Skin is warm and dry. MDM  Number of Diagnoses or Management Options  Diagnosis management comments: cxr napd. .  EKG showed a sinus bradycardia at 48 with a left axis normal intervals no ST elevation or depression T wave inversions V5 and V6 to 3 and aVF compared to EKG done 10 2416 T inversions inferiorly and laterally are new. Wbc low, no  Bands or shift. Afebrile. I doubt the patient has sepsis with transient hypotension I think she may have a mild UTI most.  Repeat troponins negative. with family and quality of life she is active at home and happy moves around frequently     considerations for discharge versus admission. With T-wave changes.   Bradycardia she may need admission for advanced dementia not sure if any interventions will discusse with cardiology    Dw/ Cardiology DIAN Davis; will admit for obs; echo in am.   Girish/ Dr Joey Alejo for admission       On 10/23/2017 pt was seen by Dr. Lauri Lara (neurology) who noted hypotension due to drugs. ED Course       Procedures    Scribe Attestation:     Carly Mack acting as a scribe for and in the presence of Tasha Ludwig MD    October 30, 2017 at 1:52 PM       Provider Attestation:     I personally performed the services described in the documentation, reviewed the documentation, as recorded by the scribe in my presence, and it accurately and completely records my words and actions.  October 30, 2017 at 1:52 PM - Tasha Ludwig MD

## 2017-10-30 NOTE — IP AVS SNAPSHOT
[address] Patient: Jigar Her MRN: [id number] [facility]:5[birth year]12 About your hospitalization You were admitted on:  October 30, 2017 You last received care in the:  [address] [place] [address] You were discharged on:  November 1, 2017 Why you were hospitalized Your primary diagnosis was:  Not on File Your diagnoses also included:  Uti (Urinary Tract Infection), Bradycardia, Hypotension Things You Need To Do (next 8 weeks) Thursday Nov 09, 2017 HOSPITAL FOLLOW-UP with Jesus Lane MD at  3:30 PM  
Where:  [facility] Discharge Orders None A check herve indicates which time of day the medication should be taken. My Medications STOP taking these medications   
 methocarbamol 500 mg tablet Commonly known as:  ROBAXIN  
   
  
 verapamil  mg ER capsule Commonly known as:  VERELAN  
   
  
  
TAKE these medications as instructed Instructions Each Dose to Equal  
 Morning Noon Evening Bedtime ALPHAGAN P 0.1 % ophthalmic solution Generic drug:  brimonidine Your last dose was: Your next dose is:    
   
   
 two (2) times a day. aspirin delayed-release 81 mg tablet Your last dose was: Your next dose is:    
   
   
 take 1 tablet by mouth once daily  
     
   
   
   
  
 atorvastatin 80 mg tablet Commonly known as:  LIPITOR Your last dose was: Your next dose is:    
   
   
 take 1 tablet by mouth once daily at bedtime  
     
   
   
   
  
 calcium carbonate 600 mg calcium (1,500 mg) tablet Commonly known as:  CALCIUM CARBONATE Your last dose was: Your next dose is: Take 1 Tab by mouth daily. take 1 tablet by mouth twice a day 600 mg  
    
   
   
   
  
 ferrous sulfate 325 mg (65 mg iron) tablet Your last dose was: Your next dose is: Take 1 Tab by mouth two (2) times a day. 325 mg  
    
   
   
   
  
 * glucose blood VI test strips strip Commonly known as:  ACCU-CHEK DINH PLUS TEST STRP Your last dose was: Your next dose is: For daily glucose checks * glucose blood VI test strips strip Commonly known as:  ACCU-CHEK DINH PLUS TEST STRP Your last dose was: Your next dose is: For daily glucose checks  
     
   
   
   
  
 hydrocortisone 1 % lotion Commonly known as:  ALA-DEEDEE Your last dose was: Your next dose is:    
   
   
 Apply  to affected area two (2) times a day. use thin layer Lancets Misc Commonly known as:  [address] [place] [address] Your last dose was: Your next dose is:    
   
   
 100 accu-chek lancets  
     
   
   
   
  
 latanoprost 0.005 % ophthalmic solution Commonly known as:  Lum Chentes Your last dose was: Your next dose is:    
   
   
 Administer 1 Drop to both eyes nightly. 1 Drop  
    
   
   
   
  
 levoFLOXacin 500 mg tablet Commonly known as:  Cody Weinberg Your last dose was: Your next dose is: Take 1 Tab by mouth every twenty-four (24) hours for 3 days. 500 mg  
    
   
   
   
  
 lisinopril 10 mg tablet Commonly known as:  Aziza Sat Your last dose was: Your next dose is:    
   
   
 take 1 tablet by mouth once daily  
     
   
   
   
  
 memantine 10 mg tablet Commonly known as:  Louis Schwab Your last dose was: Your next dose is:    
   
   
 1 q am  
     
   
   
   
  
 multivitamin tablet Commonly known as:  DAILY MULTIPLE Your last dose was: Your next dose is: Take 1 Tab by mouth daily. 1 Tab  
    
   
   
   
  
 * OTHER Your last dose was: Your next dose is: Check CBC, CMP, Mg in 4 days, results to PCP immediately, Diagnosis- UTI  
     
   
   
   
  
 * OTHER Your last dose was: Your next dose is:    
   
   
 Incentive spirometry- use as directed  
     
   
   
   
  
 raNITIdine 150 mg tablet Commonly known as:  ZANTAC Your last dose was: Your next dose is:    
   
   
 take 1 tablet by mouth twice a day SYSTANE (PROPYLENE GLYCOL) 0.4-0.3 % Drop Generic drug:  peg 400-propylene glycol Your last dose was: Your next dose is:    
   
   
 Administer  to left eye as needed. * Notice: This list has 4 medication(s) that are the same as other medications prescribed for you. Read the directions carefully, and ask your doctor or other care provider to review them with you. ASK your physician about these medications Instructions Each Dose to Equal  
 Morning Noon Evening Bedtime  
 glucosamine-chondroit-vit c-mn 500-400 mg capsule Your last dose was: Your next dose is: Take 1 Cap by mouth two (2) times a day. 1 Cap Where to Get Your Medications [facility] [address] [place] [address], [address] [place] [id number] Phone:  [phone]  
  levoFLOXacin 500 mg tablet Information on where to get these meds will be given to you by the nurse or doctor. ! Ask your nurse or doctor about these medications OTHER  
 OTHER Discharge Instructions Bradycardia: Care Instructions Your Care Instructions Bradycardia is a slow heart rate. If your heart beats too slowly, it can't supply your body with enough blood. This can make you weak or dizzy. Or it may make you pass out. Sometimes medicine can cause this problem.  If this happens, your doctor may have you adjust one of your medicines. If a medicine is not the problem, your doctor may recommend a pacemaker. It is important to treat bradycardia so that you don't get more serious health problems. Your doctor will want to see you on a routine schedule to make sure that your heartbeat is normal. 
Follow-up care is a key part of your treatment and safety. Be sure to make and go to all appointments, and call your doctor if you are having problems. It's also a good idea to know your test results and keep a list of the medicines you take. How can you care for yourself at home? · Take your medicines exactly as prescribed. Call your doctor if you think you are having a problem with your medicine. If your bradycardia is caused by another disease, your doctor will try to treat the disease. If it is caused by heart medicines, he or she will adjust your medicines. · Make lifestyle changes to improve your heart health. ¨ Get regular exercise. Try for 30 minutes on most days of the week. If you do not have other heart problems, you likely do not have limits on the type or level of activity that you can do. You may want to walk, swim, bike, or do other activities. Ask your doctor what level of exercise is safe for you. ¨ To control your cholesterol, avoid foods with a lot of fat, saturated fat, or sodium. Try to eat more fiber. And if your doctor says it's okay, get some exercise on most days. ¨ Do not smoke. Smoking can make your heart condition worse. If you need help quitting, talk to your doctor about stop-smoking programs and medicines. These can increase your chances of quitting for good. ¨ Limit alcohol to 2 drinks a day for men and 1 drink a day for women. Too much alcohol can cause health problems. Pacemaker If you have a pacemaker, you will get more specific information about it. Be sure to: · Check your pulse as your doctor tells you. · Have your pacemaker checked as often as your doctor recommends. You may be able to do this over the phone or computer. · Avoid strong magnetic or electrical fields. These include wand metal detectors used in airports, MRIs, welding equipment, and generators. · You will be checked several times right after you get your pacemaker and when it is time to have the battery changed. Batteries last for 5 to 15 years. · You can talk on a cell phone. But keep it 6 inches away from your pacemaker. · Microwaves, TVs, radios, and kitchen and bathroom appliances won't harm you. When should you call for help? Call 911 anytime you think you may need emergency care. For example, call if: 
? · You have symptoms of sudden heart failure. These may include: ¨ Severe trouble breathing. ¨ A fast or irregular heartbeat. ¨ Coughing up pink, foamy mucus. ¨ You passed out. ? · You have symptoms of a stroke. These may include: 
¨ Sudden numbness, tingling, weakness, or loss of movement in your face, arm, or leg, especially on only one side of your body. ¨ Sudden vision changes. ¨ Sudden trouble speaking. ¨ Sudden confusion or trouble understanding simple statements. ¨ Sudden problems with walking or balance. ¨ A sudden, severe headache that is different from past headaches. ?Call your doctor now or seek immediate medical care if: 
? · You have new or changed symptoms of heart failure, such as: ¨ New or increased shortness of breath. ¨ New or worse swelling in your legs, ankles, or feet. ¨ Sudden weight gain, such as more than 2 to 3 pounds in a day or 5 pounds in a week. (Your doctor may suggest a different range of weight gain.) ¨ Feeling dizzy or lightheaded or like you may faint. ¨ Feeling so tired or weak that you cannot do your usual activities. ¨ Not sleeping well. Shortness of breath wakes you at night. You need extra pillows to prop yourself up to breathe easier. ?Watch closely for changes in your health, and be sure to contact your doctor if: 
? · You do not get better as expected. Where can you learn more? Go to http://michell-everardo.info/. Enter E332 in the search box to learn more about \"Bradycardia: Care Instructions. \" Current as of: September 21, 2016 Content Version: 11.4 © 1447-6475 [employer]. Care instructions adapted under license by [employer] (which disclaims liability or warranty for this information). If you have questions about a medical condition or this instruction, always ask your healthcare professional. [place] [address] any warranty or liability for your use of this information. Heart Rhythm Problems in Heart Failure: Care Instructions Your Care Instructions A heart rhythm problem, or arrhythmia, is a change in the normal rhythm of your heart. Your heart may beat too fast or too slow or beat with an irregular or skipping rhythm. A change in the heart's rhythm may feel like a really strong heartbeat or a fluttering in your chest. A severe heart rhythm problem can keep the body from getting the blood it needs. This can cause shortness of breath, lightheadedness, and fainting. A heart rhythm problem can make your heart failure worse and increase your chance of dying suddenly. You may take medicine to treat your condition. Your doctor may recommend a pacemaker, an implantable cardioverter-defibrillator (ICD), or a procedure called catheter ablation to destroy small parts of the heart that are causing a rhythm problem. Follow-up care is a key part of your treatment and safety. Be sure to make and go to all appointments, and call your doctor if you are having problems. It's also a good idea to know your test results and keep a list of the medicines you take. How can you care for yourself at home? · Take your medicines exactly as prescribed.  Talk to your doctor if you have any problems with your medicines. · If you received a pacemaker or a defibrillator, you will get a fact sheet about it. · Wear a medical alert ID bracelet. You can buy one at most drugstores or order it on the Internet. · Make sure you go to your follow-up appointments. To change your lifestyle · Do not smoke. · Eat a heart-healthy diet. · Do not drink too much alcohol. Also, get enough sleep, and do not overeat. · Ask your doctor whether you can take over-the-counter medicines (such as decongestants). These can make your heart beat fast. 
Be active · Start light exercise if your doctor says you can. Even a small amount will help you get stronger, have more energy, and manage your stress. · Walk to get exercise easily. Start by walking a little more than you did the day before. Bit by bit, increase the amount you walk. · When you exercise, watch for signs that your heart is working too hard. You are pushing too hard if you cannot talk while you exercise. If you become short of breath or dizzy or have chest pain, sit down and rest. 
· If your doctor has not set you up with a cardiac rehabilitation (rehab) program, talk to him or her about whether that is right for you. Cardiac rehab includes exercise, help with diet and lifestyle changes, and emotional support. It may reduce your risk of future heart problems. · Check your pulse daily. Place two fingers on the artery at the palm side of your wrist, in line with your thumb. If your heartbeat seems uneven, talk to your doctor. When should you call for help? Call 911 if you have symptoms of sudden heart failure, such as: 
? · You have severe trouble breathing. ? · You cough up pink, foamy mucus. ? · You have a new irregular or rapid heartbeat. ?Call 911 if you have symptoms of a heart attack. These may include: 
? · Chest pain or pressure, or a strange feeling in the chest.  
? · Sweating. ? · Shortness of breath. ? · Nausea or vomiting. ? · Pain, pressure, or a strange feeling in the back, neck, jaw, or upper belly or in one or both shoulders or arms. ? · Lightheadedness or sudden weakness. ? · A fast or irregular heartbeat. ? After you call 911, the  may tell you to chew 1 adult-strength or 2 to 4 low-dose aspirin. Wait for an ambulance. Do not try to drive yourself. ?Call your doctor now or seek immediate medical care if: 
? · You have new or increased shortness of breath. ? · You are dizzy or lightheaded, or you feel like you may faint. ? · You have sudden weight gain, such as more than 2 to 3 pounds in a day or 5 pounds in a week. (Your doctor may suggest a different range of weight gain.) ? · You have increased swelling in your legs, ankles, or feet. ? · You are suddenly so tired or weak that you cannot do your usual activities. ? Watch closely for changes in your health, and be sure to contact your doctor if you develop new symptoms. Where can you learn more? Go to http://michell-everardo.info/. Enter B832 in the search box to learn more about \"Heart Rhythm Problems in Heart Failure: Care Instructions. \" Current as of: September 21, 2016 Content Version: 11.4 © 6461-2988 [employer]. Care instructions adapted under license by [employer] (which disclaims liability or warranty for this information). If you have questions about a medical condition or this instruction, always ask your healthcare professional. [place] [address] any warranty or liability for your use of this information. DISCHARGE SUMMARY from Nurse PATIENT INSTRUCTIONS: 
 
 
F-face looks uneven A-arms unable to move or move unevenly S-speech slurred or non-existent T-time-call 911 as soon as signs and symptoms begin-DO NOT go Back to bed or wait to see if you get better-TIME IS BRAIN. Warning Signs of HEART ATTACK Call 911 if you have these symptoms: 
? Chest discomfort. Most heart attacks involve discomfort in the center of the chest that lasts more than a few minutes, or that goes away and comes back. It can feel like uncomfortable pressure, squeezing, fullness, or pain. ? Discomfort in other areas of the upper body. Symptoms can include pain or discomfort in one or both arms, the back, neck, jaw, or stomach. ? Shortness of breath with or without chest discomfort. ? Other signs may include breaking out in a cold sweat, nausea, or lightheadedness. Don't wait more than five minutes to call [address]! Fast action can save your life. Calling 911 is almost always the fastest way to get lifesaving treatment. Emergency Medical Services staff can begin treatment when they arrive  up to an hour sooner than if someone gets to the hospital by car. The discharge information has been reviewed with the patient and guardian. The patient and guardian verbalized understanding. Discharge medications reviewed with the patient and guardian and appropriate educational materials and side effects teaching were provided. ___________________________________________________________________________________________________________________________________ Introducing [facility]! Dear David Granado: 
Thank you for requesting a [employer] account. Our records indicate that you already have an active [employer] account. You can access your account anytime at [email or web address] Did you know that you can access your hospital and ER discharge instructions at any time in [employer]? You can also review all of your test results from your hospital stay or ER visit. Additional Information If you have questions, please visit the Frequently Asked Questions section of the MyChart website at [email or web address] Remember, MyChart is NOT to be used for urgent needs. For medical emergencies, dial 911. Now available from your iPhone and Android! Unresulted Labs-Please follow up with your PCP about these lab tests Order Current Status CULTURE, BLOOD Preliminary result CULTURE, BLOOD Preliminary result Providers Seen During Your Hospitalization Provider Specialty Primary office phone Nanette Ricketts MD Emergency Medicine [phone] Rajiv Akers MD [facility] [place] [phone] Your Primary Care Physician (PCP) Primary Care Physician Office Phone Office Fax Rosanne Mark [phone] You are allergic to the following Allergen Reactions Pcn (Penicillins) Other (comments) Unknown-happened when she was very young Recent Documentation Height Weight Breastfeeding? BMI OB Status Smoking Status 1.702 m 59.1 kg No 20.42 kg/m2 Postmenopausal Never Smoker Emergency Contacts Name Discharge Info Relation Home Work Mobile Katarina Neal DISCHARGE CAREGIVER [3] Child [2] [phone] Patient Belongings The following personal items are in your possession at time of discharge: 
  Dental Appliances: None  Visual Aid: None      Home Medications: None   Jewelry: None  Clothing: Sent home    Other Valuables: None  Personal Items Sent to Safe: none Please provide this summary of care documentation to your next provider. Signatures-by signing, you are acknowledging that this After Visit Summary has been reviewed with you and you have received a copy. Patient Signature:  ____________________________________________________________ Date:  ____________________________________________________________  
  
Johnathan Hill  Provider Signature: ____________________________________________________________ Date:  ____________________________________________________________

## 2017-10-30 NOTE — ED TRIAGE NOTES
Pt brought in by EMS for AMS starting today upon their arrival pt was bradycardiac, pt responsive to pain, no s/s of acute respiratory distress, pt non ambulatory pt was given 0.5 mg of atropine IVP in route by EMS

## 2017-10-30 NOTE — IP AVS SNAPSHOT
[address] Patient: Donavon Almaguer MRN: [id number] [facility]:[birth year] My Medications STOP taking these medications   
 methocarbamol 500 mg tablet Commonly known as:  ROBAXIN  
   
  
 verapamil  mg ER capsule Commonly known as:  VERELAN  
   
  
  
TAKE these medications as instructed Instructions Each Dose to Equal  
 Morning Noon Evening Bedtime ALPHAGAN P 0.1 % ophthalmic solution Generic drug:  brimonidine Your last dose was: Your next dose is:    
   
   
 two (2) times a day. aspirin delayed-release 81 mg tablet Your last dose was: Your next dose is:    
   
   
 take 1 tablet by mouth once daily  
     
   
   
   
  
 atorvastatin 80 mg tablet Commonly known as:  LIPITOR Your last dose was: Your next dose is:    
   
   
 take 1 tablet by mouth once daily at bedtime  
     
   
   
   
  
 calcium carbonate 600 mg calcium (1,500 mg) tablet Commonly known as:  CALCIUM CARBONATE Your last dose was: Your next dose is: Take 1 Tab by mouth daily. take 1 tablet by mouth twice a day 600 mg  
    
   
   
   
  
 ferrous sulfate 325 mg (65 mg iron) tablet Your last dose was: Your next dose is: Take 1 Tab by mouth two (2) times a day. 325 mg  
    
   
   
   
  
 * glucose blood VI test strips strip Commonly known as:  ACCU-CHEK DINH PLUS TEST STRP Your last dose was: Your next dose is: For daily glucose checks * glucose blood VI test strips strip Commonly known as:  ACCU-CHEK DINH PLUS TEST STRP Your last dose was: Your next dose is: For daily glucose checks  
     
   
   
   
  
 hydrocortisone 1 % lotion Commonly known as:  ALA-DEEDEE Your last dose was: Your next dose is:    
   
   
 Apply  to affected area two (2) times a day. use thin layer Lancets Misc Commonly known as:  [address] [place] [address],[address] Your last dose was: Your next dose is:    
   
   
 100 accu-chek lancets  
     
   
   
   
  
 latanoprost 0.005 % ophthalmic solution Commonly known as:  Andrew Pacific Your last dose was: Your next dose is:    
   
   
 Administer 1 Drop to both eyes nightly. 1 Drop  
    
   
   
   
  
 levoFLOXacin 500 mg tablet Commonly known as:  Tom Palma Your last dose was: Your next dose is: Take 1 Tab by mouth every twenty-four (24) hours for 3 days. 500 mg  
    
   
   
   
  
 lisinopril 10 mg tablet Commonly known as:  Maria A Bravo Your last dose was: Your next dose is:    
   
   
 take 1 tablet by mouth once daily  
     
   
   
   
  
 memantine 10 mg tablet Commonly known as:  Ragini Mcmahan Your last dose was: Your next dose is:    
   
   
 1 q am  
     
   
   
   
  
 multivitamin tablet Commonly known as:  DAILY MULTIPLE Your last dose was: Your next dose is: Take 1 Tab by mouth daily. 1 Tab  
    
   
   
   
  
 * OTHER Your last dose was: Your next dose is:    
   
   
 Check CBC, CMP, Mg in 4 days, results to PCP immediately, Diagnosis- UTI  
     
   
   
   
  
 * OTHER Your last dose was: Your next dose is:    
   
   
 Incentive spirometry- use as directed  
     
   
   
   
  
 raNITIdine 150 mg tablet Commonly known as:  ZANTAC Your last dose was: Your next dose is:    
   
   
 take 1 tablet by mouth twice a day SYSTANE (PROPYLENE GLYCOL) 0.4-0.3 % Drop Generic drug:  peg 400-propylene glycol Your last dose was: Your next dose is:    
   
   
 Administer  to left eye as needed. * Notice: This list has 4 medication(s) that are the same as other medications prescribed for you. Read the directions carefully, and ask your doctor or other care provider to review them with you. ASK your physician about these medications Instructions Each Dose to Equal  
 Morning Noon Evening Bedtime  
 glucosamine-chondroit-vit c-mn 500-400 mg capsule Your last dose was: Your next dose is: Take 1 Cap by mouth two (2) times a day. 1 Cap Where to Get Your Medications These medications were sent to [address] [place] [address], [address] [place] [address], [address] [address] Phone:  [phone]  
  levoFLOXacin 500 mg tablet Information on where to get these meds will be given to you by the nurse or doctor. ! Ask your nurse or doctor about these medications   OTHER  
 OTHER

## 2017-10-30 NOTE — ED NOTES
Bedside shift change report given to lillian ochoa  (oncoming nurse) by Maryann Duffy  (offgoing nurse). Report included the following information SBAR, ED Summary, Intake/Output, MAR, Recent Results and Med Rec Status.

## 2017-10-31 LAB
ATRIAL RATE: 50 BPM
CALCULATED P AXIS, ECG09: -21 DEGREES
CALCULATED R AXIS, ECG10: -59 DEGREES
CALCULATED T AXIS, ECG11: -11 DEGREES
DIAGNOSIS, 93000: NORMAL
GLUCOSE BLD STRIP.AUTO-MCNC: 107 MG/DL (ref 70–110)
GLUCOSE BLD STRIP.AUTO-MCNC: 113 MG/DL (ref 70–110)
GLUCOSE BLD STRIP.AUTO-MCNC: 56 MG/DL (ref 70–110)
GLUCOSE BLD STRIP.AUTO-MCNC: 96 MG/DL (ref 70–110)
P-R INTERVAL, ECG05: 154 MS
Q-T INTERVAL, ECG07: 494 MS
QRS DURATION, ECG06: 156 MS
QTC CALCULATION (BEZET), ECG08: 450 MS
VENTRICULAR RATE, ECG03: 50 BPM

## 2017-10-31 PROCEDURE — 74011250637 HC RX REV CODE- 250/637: Performed by: INTERNAL MEDICINE

## 2017-10-31 PROCEDURE — 93306 TTE W/DOPPLER COMPLETE: CPT

## 2017-10-31 PROCEDURE — 74011000258 HC RX REV CODE- 258: Performed by: HOSPITALIST

## 2017-10-31 PROCEDURE — 74011000250 HC RX REV CODE- 250: Performed by: HOSPITALIST

## 2017-10-31 PROCEDURE — 74011250637 HC RX REV CODE- 250/637: Performed by: HOSPITALIST

## 2017-10-31 PROCEDURE — 82962 GLUCOSE BLOOD TEST: CPT

## 2017-10-31 PROCEDURE — 65660000000 HC RM CCU STEPDOWN

## 2017-10-31 PROCEDURE — 74011250636 HC RX REV CODE- 250/636: Performed by: HOSPITALIST

## 2017-10-31 RX ORDER — OXYCODONE AND ACETAMINOPHEN 5; 325 MG/1; MG/1
1 TABLET ORAL
Status: DISCONTINUED | OUTPATIENT
Start: 2017-10-31 | End: 2017-11-01 | Stop reason: HOSPADM

## 2017-10-31 RX ORDER — FAMOTIDINE 20 MG/1
20 TABLET, FILM COATED ORAL 2 TIMES DAILY
Status: DISCONTINUED | OUTPATIENT
Start: 2017-10-31 | End: 2017-11-01

## 2017-10-31 RX ADMIN — ATORVASTATIN CALCIUM 80 MG: 40 TABLET, FILM COATED ORAL at 09:02

## 2017-10-31 RX ADMIN — BRIMONIDINE TARTRATE 1 DROP: 1 SOLUTION/ DROPS OPHTHALMIC at 09:10

## 2017-10-31 RX ADMIN — MEMANTINE 10 MG: 10 TABLET ORAL at 09:02

## 2017-10-31 RX ADMIN — LISINOPRIL 10 MG: 10 TABLET ORAL at 09:02

## 2017-10-31 RX ADMIN — THERA TABS 1 TABLET: TAB at 09:02

## 2017-10-31 RX ADMIN — OXYCODONE HYDROCHLORIDE AND ACETAMINOPHEN 1 TABLET: 5; 325 TABLET ORAL at 22:01

## 2017-10-31 RX ADMIN — ASPIRIN 81 MG: 81 TABLET, COATED ORAL at 09:02

## 2017-10-31 RX ADMIN — FERROUS SULFATE TAB 325 MG (65 MG ELEMENTAL FE) 325 MG: 325 (65 FE) TAB at 09:02

## 2017-10-31 RX ADMIN — FERROUS SULFATE TAB 325 MG (65 MG ELEMENTAL FE) 325 MG: 325 (65 FE) TAB at 17:24

## 2017-10-31 RX ADMIN — VERAPAMIL HYDROCHLORIDE 240 MG: 240 TABLET, FILM COATED, EXTENDED RELEASE ORAL at 11:47

## 2017-10-31 RX ADMIN — Medication 600 MG: at 09:02

## 2017-10-31 RX ADMIN — CEFTRIAXONE 1 G: 1 INJECTION, POWDER, FOR SOLUTION INTRAMUSCULAR; INTRAVENOUS at 15:42

## 2017-10-31 RX ADMIN — BRIMONIDINE TARTRATE 1 DROP: 1 SOLUTION/ DROPS OPHTHALMIC at 18:00

## 2017-10-31 RX ADMIN — FAMOTIDINE 20 MG: 20 TABLET ORAL at 17:24

## 2017-10-31 RX ADMIN — RANITIDINE HYDROCHLORIDE 150 MG: 150 TABLET, FILM COATED ORAL at 09:02

## 2017-10-31 NOTE — CDMP QUERY
Please clarify if this patient is being treated/managed for:    =>  acute metabolic encephalopathy in setting of UTI  requiring IV abx     =>Other Explanation of clinical findings  =>Unable to Determine (no explanation of clinical findings)    The medical record reflects the following:    Risk:  advanced dementia;  UTI; advanced age. Clinical Indicators: per H&P  \"  Pt  came   secondary to complaints of altered  mental status noted by family over approximately the last 7 days\"   \" Per EMS report in field, pt was unresponsive with no radial pulse\"  \" Altered mental status/altered level of consciousness, likely multifactorial in setting of below.   due to UTI and bradycardia     Treatment: IV abx; telemetry  admit     Thank you,   Brock Mendez RN  CCDS   x [phone];

## 2017-10-31 NOTE — ROUTINE PROCESS
Bedside and Verbal shift change report given to Yokasta Ba (oncoming nurse) by Sosa Whalen (offgoing nurse). Report included the following information SBAR, Kardex, MAR and Recent Results.     SITUATION:    Code Status: Full Code   Reason for Admission: Hypotension   Bradycardia   UTI (urinary tract infection)    [facility] day: 1   Problem List:       Hospital Problems  Date Reviewed: 7/17/2017          Codes Class Noted POA    Bradycardia ICD-10-CM: R00.1  ICD-9-CM: 427.89  10/30/2017 Yes        Hypotension ICD-10-CM: I95.9  ICD-9-CM: 458.9  10/30/2017 Yes        UTI (urinary tract infection) ICD-10-CM: N39.0  ICD-9-CM: 599.0  10/24/2016 Unknown              BACKGROUND:    Past Medical History:   Past Medical History:   Diagnosis Date    Arthritis     Breast cancer ([facility] 2007    right breast (radiation)    Diabetes ([facility]     diet controlled    GERD (gastroesophageal reflux disease)     Hypertension     Memory changes          Patient taking anticoagulants no     ASSESSMENT:    Changes in Assessment Throughout Shift: none     Patient has Central Line: no Reasons if yes: na   Patient has Cerna Cath: no Reasons if yes: na      Last Vitals:     Vitals:    10/30/17 2106 10/31/17 0034 10/31/17 0110 10/31/17 0306   BP: 152/77 132/60  160/71   Pulse: (!) 50 88  (!) 59   Resp: 18 18  20   Temp: 98.2 °F (36.8 °C) 99.2 °F (37.3 °C)  97.9 °F (36.6 °C)   SpO2: 97% 95%  96%   Weight: 59 kg (130 lb 1.6 oz)  59 kg (130 lb 1.6 oz)    Height:            IV and DRAINS (will only show if present)   Peripheral IV 10/30/17 Right Antecubital-Site Assessment: Clean, dry, & intact  [REMOVED] Peripheral IV 10/26/16 Left Forearm-Site Assessment: Clean, dry, & intact  External Female Catheter 10/30/17-Site Assessment: Clean, dry, & intact     WOUND (if present)   Wound Type:  none   Dressing present Dressing Present : No   Wound Concerns/Notes:  none     PAIN    Pain Assessment    Pain Intensity 1: 0 (10/31/17 8804)              Patient Stated Pain Goal: 0  o Interventions for Pain:  na  o Intervention effective: na  o Time of last intervention: na   o Reassessment Completed: yes      Last 3 Weights:  Last 3 Recorded Weights in this Encounter    10/30/17 1336 10/30/17 2106 10/31/17 0110   Weight: 63.2 kg (139 lb 4.8 oz) 59 kg (130 lb 1.6 oz) 59 kg (130 lb 1.6 oz)     Weight change:      INTAKE/OUPUT    Current Shift: 10/30 1901 - 10/31 0700  In: 100 [P.O.:100]  Out: -     Last three shifts:       LAB RESULTS     Recent Labs      10/30/17   1335   WBC  3.6*   HGB  10.6*   HCT  32.3*   PLT  208        Recent Labs      10/30/17   1335   NA  139   K  4.1   GLU  100*   BUN  26*   CREA  0.77   CA  8.8   INR  1.0       RECOMMENDATIONS AND DISCHARGE PLANNING     1. Pending tests/procedures/ Plan of Care or Other Needs: ECHO, continue to monitor     2. Discharge plan for patient and Needs/Barriers: home    3. Estimated Discharge Date: 5 days Posted on Whiteboard in [facility]: yes      4. The patient's care plan was reviewed with the oncoming nurse. \"HEALS\" SAFETY CHECK      Fall Risk    Total Score: 4    Safety Measures: Safety Measures: Bed/Chair-Wheels locked, Bed/Chair alarm on, Bed in low position, Call light within reach, Fall prevention (comment), Gripper socks, Side rails X 3    A safety check occurred in the patient's room between off going nurse and oncoming nurse listed above.     The safety check included the below items  Area Items   H  High Alert Medications - Verify all high alert medication drips (heparin, PCA, etc.)   E  Equipment - Suction is set up for ALL patients (with yanker)  - Red plugs utilized for all equipment (IV pumps, etc.)  - WOWs wiped down at end of shift.  - Room stocked with oxygen, suction, and other unit-specific supplies   A  Alarms - Bed alarm is set for fall risk patients  - Ensure chair alarm is in place and activated if patient is up in a chair   L  Lines - Check IV for any infiltration  - Cerna bag is empty if patient has a Cerna   - Tubing and IV bags are labeled   S  Safety   - Room is clean, patient is clean, and equipment is clean. - Hallways are clear from equipment besides carts. - Fall bracelet on for fall risk patients  - Ensure room is clear and free of clutter  - Suction is set up for ALL patients (with autumn)  - Hallways are clear from equipment besides carts.    - Isolation precautions followed, supplies available outside room, sign posted     [facility]

## 2017-10-31 NOTE — CONSULTS
[facility] - Consult Note    Consultation request by August Apley, MD for advice/opinion related to evaluating Hypotension  Bradycardia  UTI (urinary tract infection)    Date of  Admission: 10/30/2017  1:27 PM   Primary Care Physician:  Heinz Gosselin, MD     Assessment:     Patient Active Problem List   Diagnosis Code    HTN (hypertension) I10    [facility] E11.9    GERD (gastroesophageal reflux disease) K21.9    Frozen shoulder M75.00    [facility] C50.919    OA (osteoarthritis) of knee M17.10    Osteoarthritis, 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    Tear of rotator cuff M75.100    Memory loss R41.3    ACS (acute coronary syndrome) [facility] I24.9    Elevated troponin R74.8    NSTEMI (non-ST elevated myocardial infarction) ([facility] I21.4    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, F02.80    UTI (urinary tract infection) N39.0    Dementia associated with other underlying disease F02.80    Advance directive discussed with patient Z71.89    Bradycardia R00.1    Hypotension I95.9       -Altered mental status/altered level of consciousness, likely multifactorial in setting of below.  -Urinary tract infection. On abx.  -Chronic sinus bradycardia, previously intolerant to BB, on verapamil as outpatient. No significant pauses overnight.  -Hypertension (on verapamil and lisinopril as outpatient) with hypotension on arrival in setting of UTI, improved with IV fluids.  -Diabetes mellitus. Hgb A1c 5.2.  -Hyperlipidemia. On statin. -H/o questionable afib per records but sinus rhythm this admission.  -Abnormal ECG. 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 Troponin unremarkable. Normal LV function with EF 50-55% by echo 10/2016.   -Advanced age, dementia, full code. Not followed routinely by cardiology. Plan:     Would continue treatment of underlying UTI.   Would continue hydration. Would not resume verapamil. Would follow hemodynamics on home lisinopril. Continued on home ASA and statin. Will review echocardiogram once complete. History of Present Illness: This is a 80 y.o. female admitted for Hypotension  Bradycardia  UTI (urinary tract infection). Patient complains of:  AMS. Patient is an 80year old female with dementia who has had AMS over the past week. Yesterday became less responsive. EMS was called. Patient was hypotensive with SBP in 70s and bradycardia with HR in 40s. Patient was founf to have UTI. Patient was treated with abx abd IV fluids with improvement in hemodynamics. No significant pauses overnight. HR overall 50s. Patient unable to provide meaningful history. Cardiac risk factors:   HTN  DM    Review of Symptoms:    Unable to provide meaningful history. Past Medical History:     Past Medical History:   Diagnosis Date    Arthritis     Breast cancer ([facility] 2007    right breast (radiation)    Diabetes ([facility]     diet controlled    GERD (gastroesophageal reflux disease)     Hypertension     Memory changes          Social History:     Social History     Social History    Marital status:      Spouse name: N/A    Number of children: N/A    Years of education: N/A     Social History Main Topics    Smoking status: Never Smoker    Smokeless tobacco: Never Used    Alcohol use No    Drug use: No    Sexual activity: No     Other Topics Concern    None     Social History Narrative        Family History:     Family History   Problem Relation Age of Onset    Cancer Mother     Cancer Father     Diabetes Sister     Diabetes Paternal Grandmother     Cancer Other         Medications:      Allergies   Allergen Reactions    Pcn [Penicillins] Other (comments)     Unknown-happened when she was very young        Current Facility-Administered Medications   Medication Dose Route Frequency    sodium chloride (NS) flush 5-10 mL  5-10 mL IntraVENous PRN    aspirin delayed-release tablet 81 mg  81 mg Oral DAILY    atorvastatin (LIPITOR) tablet 80 mg  80 mg Oral DAILY    brimonidine (ALPHAGAN P) 0.1 % ophthalmic solution 1 Drop  1 Drop Both Eyes BID    calcium carbonate (CALTREX) tablet 600 mg [elemental]  600 mg Oral DAILY    ferrous sulfate tablet 325 mg  325 mg Oral BID    latanoprost (XALATAN) 0.005 % ophthalmic solution 1 Drop  1 Drop Both Eyes QHS    lisinopril (PRINIVIL, ZESTRIL) tablet 10 mg  10 mg Oral DAILY    memantine (NAMENDA) tablet 10 mg  10 mg Oral DAILY    . PHARMACY TO SUBSTITUTE PER PROTOCOL    Per Protocol    raNITIdine (ZANTAC) tablet 150 mg  150 mg Oral BID    . PHARMACY TO SUBSTITUTE PER PROTOCOL    Per Protocol    cefTRIAXone (ROCEPHIN) 1 g in 0.9% sodium chloride (MBP/ADV) 50 mL MBP  1 g IntraVENous Q24H    therapeutic multivitamin (THERAGRAN) tablet 1 Tab  1 Tab Oral DAILY         Physical Exam:     Visit Vitals    /71 (BP 1 Location: Right arm, BP Patient Position: Lying left side)    Pulse (!) 59    Temp 97.9 °F (36.6 °C)    Resp 20    Ht 5' 7\" (1.702 m)    Wt 59 kg (130 lb 1.6 oz)    SpO2 96%    Breastfeeding No    BMI 20.38 kg/m2     BP Readings from Last 3 Encounters:   10/31/17 160/71   10/23/17 90/50   09/11/17 142/80     Pulse Readings from Last 3 Encounters:   10/31/17 (!) 59   10/23/17 (!) 52   09/11/17 80     Wt Readings from Last 3 Encounters:   10/31/17 59 kg (130 lb 1.6 oz)   07/17/17 64 kg (141 lb)   10/27/16 64 kg (141 lb 1.5 oz)       General:  alert, cooperative, no distress, appears stated age  Neck:  no JVD  Lungs:  clear to auscultation bilaterally  Heart:  regular rate and rhythm  Abdomen:  abdomen is soft without significant tenderness, masses, organomegaly or guarding  Extremities:  extremities normal, atraumatic, no cyanosis or edema  Skin: Warm and dry. no hyperpigmentation, vitiligo, or suspicious lesions  Neuro: Alert but not oriented.   Psych: anxious     Data Review: Recent Labs      10/30/17   1335   WBC  3.6*   HGB  10.6*   HCT  32.3*   PLT  208     Recent Labs      10/30/17   1335   NA  139   K  4.1   CL  106   CO2  26   GLU  100*   BUN  26*   CREA  0.77   CA  8.8   ALB  2.8*   SGOT  19   ALT  21   INR  1.0       Results for orders placed or performed during the hospital encounter of 10/30/17   EKG, 12 LEAD, INITIAL   Result Value Ref Range    Ventricular Rate 48 BPM    Atrial Rate 48 BPM    P-R Interval 180 ms    QRS Duration 138 ms    Q-T Interval 516 ms    QTC Calculation (Bezet) 460 ms    Calculated P Axis -4 degrees    Calculated R Axis -59 degrees    Calculated T Axis -71 degrees    Diagnosis       Sinus bradycardia with sinus arrhythmia  Left axis deviation  Nonspecific intraventricular block  Cannot rule out Septal infarct (cited on or before 25-AUG-2016)  T wave abnormality, consider inferolateral ischemia  Abnormal ECG  When compared with ECG of 24-OCT-2016 19:51,  Questionable change in initial forces of Anterior leads  Inverted T waves have replaced nonspecific T wave abnormality in Inferior   leads  T wave inversion now evident in Lateral leads  Confirmed by Shania Galdamez ([id number]) on 10/30/2017 3:37:09 PM         All Cardiac Markers in the last 24 hours:    Lab Results   Component Value Date/Time     10/30/2017 10:01 PM     10/30/2017 05:50 PM     10/30/2017 01:35 PM    CKMB 5.5 (H) 10/30/2017 10:01 PM    CKMB 2.2 10/30/2017 05:50 PM    CKMB 2.0 10/30/2017 01:35 PM    CKND1 3.2 10/30/2017 10:01 PM    CKND1 2.0 10/30/2017 05:50 PM    CKND1 1.8 10/30/2017 01:35 PM    TROIQ <0.02 10/30/2017 10:01 PM    TROIQ <0.02 10/30/2017 05:50 PM    TROIQ <0.02 10/30/2017 01:35 PM       Last Lipid:    Lab Results   Component Value Date/Time    Cholesterol, total 142 10/25/2016 02:01 AM    HDL Cholesterol 65 10/25/2016 02:01 AM    LDL, calculated 65.2 10/25/2016 02:01 AM    Triglyceride 59 10/25/2016 02:01 AM    CHOL/HDL Ratio 2.2 10/25/2016 02:01 AM Signed By: DIAN Marks     October 31, 2017

## 2017-10-31 NOTE — INTERDISCIPLINARY ROUNDS
IDR/SLIDR Summary          Patient: Blanca Espinosa MRN: [id number]    Age: 80 y.o. YOB: 1933 Room/Bed: [facility]/   Admit Diagnosis: Hypotension  Bradycardia  UTI (urinary tract infection)  Principal Diagnosis: <principal problem not specified>   Goals: safety  Readmission: NO  Quality Measure: Not applicable  VTE Prophylaxis: Not needed  Influenza Vaccine screening completed? YES  Pneumococcal Vaccine screening completed? NO  Mobility needs: Yes   Nutrition plan:Yes  Consults:P.T, O.T. and Case Management    Financial concerns:No  Escalated to CM? NO  RRAT Score: 27   Interventions:H2H  Testing due for pt today?  YES  LOS: 1 days Expected length of stay 5 days  Discharge plan: home   PCP: Heinz Gosselin, MD  Transportation needs: Yes    Days before discharge:discharge pending  Discharge disposition: Home    Signed:     Oscar Piña  10/31/2017  3:25 AM

## 2017-10-31 NOTE — H&P
[address] [place] [address] PS    Name:  Tete Laguerre  MR#:  [id number]  :  1933  Account #:  [de-identified]  Date of Adm:  10/30/2017      CHIEF COMPLAINT: Altered mental status/worsening confusion. HISTORY OF PRESENT ILLNESS: The patient is a pleasant 80-year-  old black female with a known history of advanced dementia, oriented  x1, presenting to [facility] secondary to complaints of altered  mental status noted by family over approximately the last 7 days. Today, she became less responsive and emergency rescue squad was  called. At that time, they noted that her blood pressures were in the  87N systolic and her heart rate was in the 40s. The patient was  subsequently transported to the emergency department and  subsequent blood pressure reading was initially 70s over 40s;  however, her blood pressure normalized after this. She was noted to  have a heart rate in the mid to upper 40s with some episodes in the  low 50s. Cardiology was contacted and the cardiology PA  recommended admission overnight with plan for echocardiogram  tomorrow and possibly subsequent discharge. PAST MEDICAL HISTORY  1. Advanced dementia. 2. Breast cancer status post right breast radiation. 3. Diet-controlled diabetes. 4. Gastroesophageal reflux disease. 5. Hypertension, benign, essential.    PAST SURGICAL HISTORY  1. Appendectomy. 2. Cholecystectomy. 3. History of breast biopsy. SOCIAL HISTORY: The patient has never been a smoker, denies any  alcohol or illicit drug use. She currently lives with her daughters. FAMILY HISTORY: Multiple family members including father and  mother with cancer. Also, a sister and paternal grandmother with  diabetes. REVIEW OF SYSTEMS: Unable to obtain secondary to the patient's  orientation only to self. HOME MEDICATIONS  1. Aspirin 81 mg p.o. daily. 2. Lipitor 80 mg p.o. daily. 3. Alphagan eyedrops applied in both eyes twice daily.   4. Calcium carbonate 600 mg p.o. twice daily. 5. Ferrous sulfate 325 mg p.o. b.i.d.  6. Glucosamine chondroitin sulfate with vitamin C 1 capsule 2 times  daily. 7. Hydrocortisone 1% lotion apply topically 2 times daily. 8. Xalatan ophthalmic solution 1 drop both eyes every night. 9. Lisinopril 10 mg p.o. daily. 10. Memantine 10 mg p.o. daily. 11. Robaxin 500 mg p.o. 4 times daily. 12. Multivitamin 1 tablet daily. 13. Systane eyedrops apply to left eye p.r.n. 14. Ranitidine 150 mg p.o. b.i.d. 15. Verapamil 240 mg extended release capsule 1 tablet daily. PHYSICAL EXAMINATION  VITAL SIGNS: Temperature 98.7, pulse 52, respirations 18, blood  pressure 129/92, pulse oximetry 97% on room air. GENERAL: The patient is a thin, chronically ill-appearing 80-year-old  female in no acute distress. She is unable to provide any significant  verbal answers to my questions. HEENT: Eyes PERRLA. Ears are clear. Nose is clear, no rhinorrhea or  sinusitis. Throat is clear with no pharyngitis or oral lesions. Head  normocephalic, atraumatic. NECK: Supple, without masses or tenderness. No thyromegaly or  lymphadenopathy is present. LUNGS: Clear to auscultation bilaterally. HEART: Regular. No murmurs, rubs or gallops. ABDOMEN: Soft, nontender, nondistended with bowel sounds present  in all 4 quadrants. EXTREMITIES: Warm. Pulses are patent bilaterally. No lower  extremity edema is present. SKIN: No lesions or rashes. NEUROLOGIC: Cranial nerves 2 through 12 grossly intact. LABORATORY DATA: WBC is 3.6, hemoglobin 10.6, hematocrit 32.3,  platelets 349. Sodium 139, potassium 4.1, chloride 106, bicarbonate  26, glucose 100, BUN 26, creatinine 0.77. LFTs are within normal  limits. Troponin I less than 0.02. . INR 1.0. Urinalysis shows  moderate leukocyte esterase, 1+ epithelial cells, moderate white cells,  moderate bacteria. Chest x-ray is negative for any acute cardiopulmonary process. ASSESSMENT  1.  Urinary tract infection. 2. Advanced dementia without evidence of acute encephalopathy  3. Bradycardia. 4. Hypotension. PLAN: The patient is admitted under observation status to the general  medical floor with telemetry. I have ordered a 2-D echo for tomorrow  morning. I have also ordered Rocephin 1 gram IV daily for her urinary  tract infection. This appears to be an uncomplicated UTI and she  potentially could be switched over to oral antibiotics tomorrow and  continue this at home. At this point, in light of her advanced dementia, I  would be fairly hesitant to do any type of invasive procedures with this  nice patient. The patient is a FULL CODE. For DVT prophylaxis, I have  ordered bilateral SCDs. I anticipate discharge home tomorrow. I see no evidence of acute encephalopathy.           MD EDWAR Collins / ALANA  D:  10/30/2017   22:01  T:  10/31/2017   00:23  Job #:  [id number]

## 2017-10-31 NOTE — PROGRESS NOTES
Famotidine (Pepcid) was therapeutically interchanged for Ranitidine (Zantac) per the P&T Committee approved Therapeutic Interchanges Policy.

## 2017-10-31 NOTE — ROUTINE PROCESS
Received patient in bed, awake, alert and oriented with confusion. No complaints made, will continue to monitor. Report  Given by Virgil Ortez RN.

## 2017-10-31 NOTE — PROGRESS NOTES
Care Management Interventions  PCP Verified by CM: Yes (Torin [facility])  Mode of Transport at Discharge: BLS  Transition of Care Consult (CM Consult): Discharge Planning, [address]: No  Reason Outside [place]: Patient already serviced by other home care/hospice agency  Physical Therapy Consult: Yes  Occupational Therapy Consult: Yes  Current Support Network: Other (daughter, Becca Gibson, lives with pt)  Plan discussed with Pt/Family/Caregiver: Yes  Freedom of Choice Offered: Yes  Discharge Location  Discharge Placement: Home with home health     Daughter Guy Fuentes - [phone]    Daughter currently in room. Pt confused. Daughter, Sheeba Gonzales, lives with pt. Pt has personal aid service with [facility]. There is an aid in the home at all times. There is a walker and a wheelchair in the home. Pt is mainly wheelchair bound. Uses paratransit bus services for pt's transportation [place] [facility] [place]. Daughter chooses to [facility]. Confirmed with [facility] [address] that they are capable of PT/OT and SN service. They confirmed that they do offer SN and PT/OT services. All pertinent info faxed to [phone]. Will need transportation at D/C.

## 2017-10-31 NOTE — ROUTINE PROCESS
TRANSFER - IN REPORT:    Verbal report received from Donnell RN(name) on Wysalena Signs  being received from ED(unit) for routine progression of care      Report consisted of patients Situation, Background, Assessment and   Recommendations(SBAR). Information from the following report(s) SBAR, Kardex, ED Summary, Intake/Output, MAR and Recent Results was reviewed with the receiving nurse. Opportunity for questions and clarification was provided. Assessment will be completed upon patients arrival to unit and care will be assumed.

## 2017-10-31 NOTE — PROGRESS NOTES
conducted an initial consultation and Spiritual Assessment for Taylor Roche, who is a 80 y.o.,female. Patients Primary Language is: [place]. According to the patients EMR Adventist Affiliation is: [facility].     The reason the Patient came to the hospital is:   Patient Active Problem List    Diagnosis Date Noted    Bradycardia 10/30/2017    Hypotension 10/30/2017    Advance directive discussed with patient 09/11/2017    Dementia associated with other underlying disease 05/15/2017    ACS (acute coronary syndrome) ([facility] 10/24/2016    Elevated troponin 10/24/2016    NSTEMI (non-ST elevated myocardial infarction) ([facility] 10/24/2016    Early onset Alzheimer's dementia without behavioral disturbance 10/24/2016    UTI (urinary tract infection) 10/24/2016    Memory loss 05/20/2016    OA (osteoarthritis) of knee 10/29/2015    Osteoarthritis, shoulder 10/29/2015    Tear of rotator cuff 10/29/2015    Frozen shoulder 09/25/2013    [facility]    HTN (hypertension) 06/25/2013    Diabetes mellitus ([facility] 06/25/2013    GERD (gastroesophageal reflux disease) 06/25/2013        The  provided the following Interventions:  Initiated a relationship of care and support. Explored issues of meli, belief, spirituality and Mormonism/ritual needs while hospitalized. Listened empathically. Provided chaplaincy education. Provided information about Spiritual Care Services. Offered prayer and assurance of continued prayers on patient's behalf. Chart reviewed. The following outcomes where achieved:  Patient shared limited information about both their medical narrative and spiritual journey/beliefs.  confirmed Patient's Adventist Affiliation. Patient processed feeling about current hospitalization. Patient expressed gratitude for 's visit.     Assessment:  Patient does not have any Mormonism/cultural needs that will affect patients preferences in health care.  There are no spiritual or Adventist issues which require intervention at this time. Plan:  Chaplains will continue to follow and will provide pastoral care on an as needed/requested basis.  recommends bedside caregivers page  on duty if patient shows signs of acute spiritual or emotional distress.     [facility]  [phone]

## 2017-10-31 NOTE — PROGRESS NOTES
[facility]  Progress Note    Patient: Seng Logan Age: 80 y.o. : 2/3/1933 MR#: [id number] SSN: [id number]  Date: 10/31/2017     Subjective:     Patient was seen and evaluated independently on 10/31/17. Patient in NAD, has dementia, at baseline mental state per daughter at bedside    Assessment/Plan:     1. Urinary tract infection. 2. Advanced dementia without evidence of acute encephalopathy  3. Bradycardia. 4. Hypotension.     PLAN  On abx, follow cx  Supportive care  Stop verapamil, cardio following  Advanced care planning: full code per daughter at bedside  dispo- daughter declines snf/rehab, wants patient home with [facility]    Case discussed with:  []Patient  []Family  []Nursing  []Case Management  DVT Prophylaxis:  []Lovenox  []Hep SQ  []SCDs  []Coumadin   []On Heparin gtt    Objective:   VS:   Visit Vitals    /65 (BP 1 Location: Right arm, BP Patient Position: At rest)    Pulse (!) 50    Temp 97.6 °F (36.4 °C)    Resp 20    Ht 5' 7\" (1.702 m)    Wt 59 kg (130 lb 1.6 oz)    SpO2 98%    Breastfeeding No    BMI 20.38 kg/m2      Tmax/24hrs: Temp (24hrs), Av.2 °F (36.8 °C), Min:97.6 °F (36.4 °C), Max:99.2 °F (37.3 °C)    Intake/Output Summary (Last 24 hours) at 10/31/17 1430  Last data filed at 10/31/17 0629   Gross per 24 hour   Intake              100 ml   Output             1050 ml   Net             -950 ml       General:  Awake, has dementia  Cardiovascular:  S1S2+, RRR  Pulmonary:  CTA b/l  GI:  Soft, BS+, NT, ND  Extremities:  No edema      Labs:    Recent Results (from the past 24 hour(s))   EKG, 12 LEAD, INITIAL    Collection Time: 10/30/17  3:03 PM   Result Value Ref Range    Ventricular Rate 48 BPM    Atrial Rate 48 BPM    P-R Interval 180 ms    QRS Duration 138 ms    Q-T Interval 516 ms    QTC Calculation (Bezet) 460 ms    Calculated P Axis -4 degrees    Calculated R Axis -59 degrees    Calculated T Axis -71 degrees    Diagnosis Sinus bradycardia with sinus arrhythmia  Left axis deviation  Nonspecific intraventricular block  Cannot rule out Septal infarct (cited on or before 25-AUG-2016)  T wave abnormality, consider inferolateral ischemia  Abnormal ECG  When compared with ECG of 24-OCT-2016 19:51,  Questionable change in initial forces of Anterior leads  Inverted T waves have replaced nonspecific T wave abnormality in Inferior   leads  T wave inversion now evident in Lateral leads  Confirmed by Axel Shukla [id number]) on 10/30/2017 3:37:09 PM     TSH 3RD GENERATION    Collection Time: 10/30/17  5:50 PM   Result Value Ref Range    TSH 1.39 0.36 - 3.74 uIU/mL   CARDIAC PANEL,(CK, CKMB & TROPONIN)    Collection Time: 10/30/17  5:50 PM   Result Value Ref Range     26 - 192 U/L    CK - MB 2.2 <3.6 ng/ml    CK-MB Index 2.0 0.0 - 4.0 %    Troponin-I, Qt. <0.02 0.0 - 0.045 NG/ML   EKG, 12 LEAD, SUBSEQUENT    Collection Time: 10/30/17  7:42 PM   Result Value Ref Range    Ventricular Rate 50 BPM    Atrial Rate 50 BPM    P-R Interval 154 ms    QRS Duration 156 ms    Q-T Interval 494 ms    QTC Calculation (Bezet) 450 ms    Calculated P Axis -21 degrees    Calculated R Axis -59 degrees    Calculated T Axis -11 degrees    Diagnosis       Sinus bradycardia with premature atrial complexes in a pattern of bigeminy  Left axis deviation  Left bundle branch block  Abnormal ECG  When compared with ECG of 30-OCT-2017 15:03,  premature atrial complexes are now present  T wave inversion less evident in Inferior leads  Confirmed by Axel Shukla ([id number]) on 10/31/2017 8:49:33 AM     GLUCOSE, POC    Collection Time: 10/30/17  9:25 PM   Result Value Ref Range    Glucose (POC) 81 70 - 110 mg/dL   LACTIC ACID    Collection Time: 10/30/17 10:01 PM   Result Value Ref Range    Lactic acid 1.1 0.4 - 2.0 MMOL/L   CARDIAC PANEL,(CK, CKMB & TROPONIN)    Collection Time: 10/30/17 10:01 PM   Result Value Ref Range     26 - 192 U/L    CK - MB 5.5 (H) <3.6 ng/ml    CK-MB Index 3.2 0.0 - 4.0 %    Troponin-I, Qt. <0.02 0.0 - 0.045 NG/ML   GLUCOSE, POC    Collection Time: 10/31/17  9:15 AM   Result Value Ref Range    Glucose (POC) 56 (L) 70 - 110 mg/dL   GLUCOSE, POC    Collection Time: 10/31/17 11:49 AM   Result Value Ref Range    Glucose (POC) 96 70 - 110 mg/dL       Signed By: Cortes Molina MD     October 31, 2017 2:30 PM

## 2017-10-31 NOTE — PROGRESS NOTES
Problem: Falls - Risk of  Goal: *Absence of Falls  Document Maylin Fall Risk and appropriate interventions in the flowsheet.   Outcome: Progressing Towards Goal  Fall Risk Interventions:  Mobility Interventions: Assess mobility with egress test, Bed/chair exit alarm, Communicate number of staff needed for ambulation/transfer, Patient to call before getting OOB, PT Consult for mobility concerns, PT Consult for assist device competence, Strengthening exercises (ROM-active/passive), Utilize walker, cane, or other assitive device, Utilize gait belt for transfers/ambulation    Mentation Interventions: Adequate sleep, hydration, pain control, Bed/chair exit alarm, Door open when patient unattended, Evaluate medications/consider consulting pharmacy, Increase mobility, More frequent rounding, Reorient patient, Room close to nurse's station, Self-releasing belt, Toileting rounds, Update white board    Medication Interventions: Assess postural VS orthostatic hypotension, Bed/chair exit alarm, Evaluate medications/consider consulting pharmacy, Patient to call before getting OOB, Teach patient to arise slowly    Elimination Interventions: Bed/chair exit alarm, Call light in reach, Elevated toilet seat, Patient to call for help with toileting needs, Toilet paper/wipes in reach, Toileting schedule/hourly rounds

## 2017-11-01 VITALS
DIASTOLIC BLOOD PRESSURE: 65 MMHG | BODY MASS INDEX: 20.47 KG/M2 | SYSTOLIC BLOOD PRESSURE: 127 MMHG | TEMPERATURE: 97.4 F | OXYGEN SATURATION: 100 % | WEIGHT: 130.4 LBS | HEART RATE: 55 BPM | RESPIRATION RATE: 18 BRPM | HEIGHT: 67 IN

## 2017-11-01 LAB
ANION GAP SERPL CALC-SCNC: 6 MMOL/L (ref 3–18)
BASOPHILS # BLD: 0 K/UL (ref 0–0.1)
BASOPHILS NFR BLD: 0 % (ref 0–2)
BUN SERPL-MCNC: 19 MG/DL (ref 7–18)
BUN/CREAT SERPL: 24 (ref 12–20)
CALCIUM SERPL-MCNC: 8.8 MG/DL (ref 8.5–10.1)
CHLORIDE SERPL-SCNC: 107 MMOL/L (ref 100–108)
CO2 SERPL-SCNC: 26 MMOL/L (ref 21–32)
CREAT SERPL-MCNC: 0.79 MG/DL (ref 0.6–1.3)
DIFFERENTIAL METHOD BLD: ABNORMAL
EOSINOPHIL # BLD: 0 K/UL (ref 0–0.4)
EOSINOPHIL NFR BLD: 0 % (ref 0–5)
ERYTHROCYTE [DISTWIDTH] IN BLOOD BY AUTOMATED COUNT: 13.1 % (ref 11.6–14.5)
GLUCOSE BLD STRIP.AUTO-MCNC: 122 MG/DL (ref 70–110)
GLUCOSE BLD STRIP.AUTO-MCNC: 87 MG/DL (ref 70–110)
GLUCOSE SERPL-MCNC: 99 MG/DL (ref 74–99)
HCT VFR BLD AUTO: 30.1 % (ref 35–45)
HGB BLD-MCNC: 10 G/DL (ref 12–16)
LYMPHOCYTES # BLD: 0.6 K/UL (ref 0.9–3.6)
LYMPHOCYTES NFR BLD: 9 % (ref 21–52)
MAGNESIUM SERPL-MCNC: 1.8 MG/DL (ref 1.6–2.6)
MCH RBC QN AUTO: 31.1 PG (ref 24–34)
MCHC RBC AUTO-ENTMCNC: 33.2 G/DL (ref 31–37)
MCV RBC AUTO: 93.5 FL (ref 74–97)
MONOCYTES # BLD: 0.5 K/UL (ref 0.05–1.2)
MONOCYTES NFR BLD: 6 % (ref 3–10)
NEUTS SEG # BLD: 6.4 K/UL (ref 1.8–8)
NEUTS SEG NFR BLD: 85 % (ref 40–73)
PLATELET # BLD AUTO: 220 K/UL (ref 135–420)
PMV BLD AUTO: 12.6 FL (ref 9.2–11.8)
POTASSIUM SERPL-SCNC: 4 MMOL/L (ref 3.5–5.5)
RBC # BLD AUTO: 3.22 M/UL (ref 4.2–5.3)
SODIUM SERPL-SCNC: 139 MMOL/L (ref 136–145)
WBC # BLD AUTO: 7.5 K/UL (ref 4.6–13.2)

## 2017-11-01 PROCEDURE — 74011000250 HC RX REV CODE- 250: Performed by: HOSPITALIST

## 2017-11-01 PROCEDURE — 82962 GLUCOSE BLOOD TEST: CPT

## 2017-11-01 PROCEDURE — 83735 ASSAY OF MAGNESIUM: CPT | Performed by: EMERGENCY MEDICINE

## 2017-11-01 PROCEDURE — 74011250637 HC RX REV CODE- 250/637: Performed by: INTERNAL MEDICINE

## 2017-11-01 PROCEDURE — 97161 PT EVAL LOW COMPLEX 20 MIN: CPT

## 2017-11-01 PROCEDURE — 74011250637 HC RX REV CODE- 250/637: Performed by: EMERGENCY MEDICINE

## 2017-11-01 PROCEDURE — 74011250637 HC RX REV CODE- 250/637: Performed by: HOSPITALIST

## 2017-11-01 PROCEDURE — 85025 COMPLETE CBC W/AUTO DIFF WBC: CPT | Performed by: EMERGENCY MEDICINE

## 2017-11-01 PROCEDURE — 36415 COLL VENOUS BLD VENIPUNCTURE: CPT | Performed by: EMERGENCY MEDICINE

## 2017-11-01 PROCEDURE — 80048 BASIC METABOLIC PNL TOTAL CA: CPT | Performed by: EMERGENCY MEDICINE

## 2017-11-01 RX ORDER — LEVOFLOXACIN 500 MG/1
500 TABLET, FILM COATED ORAL EVERY 24 HOURS
Status: DISCONTINUED | OUTPATIENT
Start: 2017-11-01 | End: 2017-11-01 | Stop reason: HOSPADM

## 2017-11-01 RX ORDER — LEVOFLOXACIN 500 MG/1
500 TABLET, FILM COATED ORAL EVERY 24 HOURS
Qty: 3 TAB | Refills: 0 | Status: SHIPPED | OUTPATIENT
Start: 2017-11-01 | End: 2017-11-04

## 2017-11-01 RX ORDER — FAMOTIDINE 20 MG/1
20 TABLET, FILM COATED ORAL DAILY
Status: DISCONTINUED | OUTPATIENT
Start: 2017-11-02 | End: 2017-11-01 | Stop reason: HOSPADM

## 2017-11-01 RX ADMIN — Medication 600 MG: at 08:11

## 2017-11-01 RX ADMIN — MEMANTINE 10 MG: 10 TABLET ORAL at 08:11

## 2017-11-01 RX ADMIN — FAMOTIDINE 20 MG: 20 TABLET ORAL at 09:00

## 2017-11-01 RX ADMIN — FERROUS SULFATE TAB 325 MG (65 MG ELEMENTAL FE) 325 MG: 325 (65 FE) TAB at 08:11

## 2017-11-01 RX ADMIN — LEVOFLOXACIN 500 MG: 500 TABLET, FILM COATED ORAL at 15:01

## 2017-11-01 RX ADMIN — THERA TABS 1 TABLET: TAB at 08:11

## 2017-11-01 RX ADMIN — OXYCODONE HYDROCHLORIDE AND ACETAMINOPHEN 1 TABLET: 5; 325 TABLET ORAL at 08:11

## 2017-11-01 RX ADMIN — ATORVASTATIN CALCIUM 80 MG: 40 TABLET, FILM COATED ORAL at 08:11

## 2017-11-01 RX ADMIN — BRIMONIDINE TARTRATE 1 DROP: 1 SOLUTION/ DROPS OPHTHALMIC at 09:00

## 2017-11-01 RX ADMIN — LISINOPRIL 10 MG: 10 TABLET ORAL at 08:11

## 2017-11-01 RX ADMIN — ASPIRIN 81 MG: 81 TABLET, COATED ORAL at 08:11

## 2017-11-01 NOTE — ROUTINE PROCESS
Bedside and Verbal shift change report given to [id number] [place] [address] (oncoming nurse) by Beryle Ferretti   (offgoing nurse). Report included the following information SBAR, Kardex, Intake/Output, MAR and Cardiac Rhythm Sinus Lorilee Burkitt.

## 2017-11-01 NOTE — PROGRESS NOTES
Problem: Falls - Risk of  Goal: *Absence of Falls  Document Maylin Fall Risk and appropriate interventions in the flowsheet.    Outcome: Progressing Towards Goal  Fall Risk Interventions:  Mobility Interventions: Assess mobility with egress test, Bed/chair exit alarm, Communicate number of staff needed for ambulation/transfer, OT consult for ADLs, Patient to call before getting OOB, PT Consult for mobility concerns, PT Consult for assist device competence, Strengthening exercises (ROM-active/passive), Utilize walker, cane, or other assitive device    Mentation Interventions: Bed/chair exit alarm, Adequate sleep, hydration, pain control, Door open when patient unattended, Evaluate medications/consider consulting pharmacy, Eyeglasses and hearing aids, Increase mobility, More frequent rounding, Reorient patient, Room close to nurse's station, Self-releasing belt, Toileting rounds, Update white board    Medication Interventions: Assess postural VS orthostatic hypotension, Evaluate medications/consider consulting pharmacy, Bed/chair exit alarm, Patient to call before getting OOB, Teach patient to arise slowly    Elimination Interventions: Bed/chair exit alarm, Call light in reach, Elevated toilet seat, Patient to call for help with toileting needs, Toilet paper/wipes in reach, Toileting schedule/hourly rounds

## 2017-11-01 NOTE — ROUTINE PROCESS
Bedside and Verbal shift change report given to Faye Torre (oncoming nurse) by Renata Rushing (offgoing nurse). Report included the following information SBAR, Kardex, MAR and Recent Results.     SITUATION:    Code Status: Full Code   Reason for Admission: Hypotension   Bradycardia   UTI (urinary tract infection)    [address] [facility] day: 2   Problem List:       Hospital Problems  Date Reviewed: 7/17/2017          Codes Class Noted POA    Bradycardia ICD-10-CM: R00.1  ICD-9-CM: 427.89  10/30/2017 Yes        Hypotension ICD-10-CM: I95.9  ICD-9-CM: 458.9  10/30/2017 Yes        UTI (urinary tract infection) ICD-10-CM: N39.0  ICD-9-CM: 599.0  10/24/2016 Unknown              BACKGROUND:    Past Medical History:   Past Medical History:   Diagnosis Date    Arthritis     Breast cancer ([facility] [address] 2007    right breast (radiation)    Diabetes ([facility] [address]     diet controlled    GERD (gastroesophageal reflux disease)     Hypertension     Memory changes          Patient taking anticoagulants no     ASSESSMENT:    Changes in Assessment Throughout Shift: none     Patient has Central Line: no Reasons if yes: na   Patient has Cerna Cath: no Reasons if yes: na      Last Vitals:     Vitals:    10/31/17 1614 10/31/17 1931 10/31/17 2347 11/01/17 0430   BP: 143/65 157/80 142/70 128/82   Pulse: 85 68 61 62   Resp: 20 20 20 18   Temp: 98.4 °F (36.9 °C) 98.7 °F (37.1 °C) 98.7 °F (37.1 °C) 97.4 °F (36.3 °C)   SpO2: 97% 98% 95% 98%   Weight:       Height:            IV and DRAINS (will only show if present)   [REMOVED] Peripheral IV 10/30/17 Right Antecubital-Site Assessment: Clean, dry, & intact  [REMOVED] Peripheral IV 10/26/16 Left Forearm-Site Assessment: Clean, dry, & intact  External Female Catheter 10/30/17-Site Assessment: Clean, dry, & intact  Peripheral IV 10/31/17 Right Antecubital-Site Assessment: Clean, dry, & intact     WOUND (if present)   Wound Type:  none   Dressing present Dressing Present : No   Wound Concerns/Notes: none     PAIN    Pain Assessment    Pain Intensity 1: 0 (11/01/17 0414)    Pain Location 1: Generalized    Pain Intervention(s) 1: Medication (see MAR), MD notified (comment)    Patient Stated Pain Goal: 0  o Interventions for Pain:  Yes/percocet  o Intervention effective: yes  o Time of last intervention: see MAR   o Reassessment Completed: yes      Last 3 Weights:  Last 3 Recorded Weights in this Encounter    10/30/17 2106 10/31/17 0110 10/31/17 0618   Weight: 59 kg (130 lb 1.6 oz) 59 kg (130 lb 1.6 oz) 59 kg (130 lb 1.6 oz)     Weight change:      INTAKE/OUPUT    Current Shift: 10/31 1901 - 11/01 0700  In: -   Out: 1 [Urine:1]    Last three shifts: 10/30 0701 - 10/31 1900  In: 750 [P.O.:750]  Out: 1450 [Urine:1450]     LAB RESULTS     Recent Labs      11/01/17   0211  10/30/17   1335   WBC  7.5  3.6*   HGB  10.0*  10.6*   HCT  30.1*  32.3*   PLT  220  208        Recent Labs      11/01/17   0211  10/30/17   1335   NA  139  139   K  4.0  4.1   GLU  99  100*   BUN  19*  26*   CREA  0.79  0.77   CA  8.8  8.8   MG  1.8   --    INR   --   1.0       RECOMMENDATIONS AND DISCHARGE PLANNING     1. Pending tests/procedures/ Plan of Care or Other Needs: continue to monitor     2. Discharge plan for patient and Needs/Barriers: home    3. Estimated Discharge Date: pending Posted on Whiteboard in Patients Room: yes      4. The patient's care plan was reviewed with the oncoming nurse. \"HEALS\" SAFETY CHECK      Fall Risk    Total Score: 3    Safety Measures: Safety Measures: Bed/Chair alarm on, Bed/Chair-Wheels locked, Bed in low position, Call light within reach, Fall prevention (comment), Gripper socks, Side rails X 3    A safety check occurred in the patient's room between off going nurse and oncoming nurse listed above.     The safety check included the below items  Area Items   H  High Alert Medications - Verify all high alert medication drips (heparin, PCA, etc.)   E  Equipment - Suction is set up for ALL patients (with autumn)  - Red plugs utilized for all equipment (IV pumps, etc.)  - WOWs wiped down at end of shift.  - Room stocked with oxygen, suction, and other unit-specific supplies   A  Alarms - Bed alarm is set for fall risk patients  - Ensure chair alarm is in place and activated if patient is up in a chair   L  Lines - Check IV for any infiltration  - Cerna bag is empty if patient has a Cerna   - Tubing and IV bags are labeled   S  Safety   - Room is clean, patient is clean, and equipment is clean. - Hallways are clear from equipment besides carts. - Fall bracelet on for fall risk patients  - Ensure room is clear and free of clutter  - Suction is set up for ALL patients (with autumn)  - Hallways are clear from equipment besides carts.    - Isolation precautions followed, supplies available outside room, sign posted     Ariela Walsh

## 2017-11-01 NOTE — DISCHARGE INSTRUCTIONS
Bradycardia: Care Instructions  Your Care Instructions    Bradycardia is a slow heart rate. If your heart beats too slowly, it can't supply your body with enough blood. This can make you weak or dizzy. Or it may make you pass out. Sometimes medicine can cause this problem. If this happens, your doctor may have you adjust one of your medicines. If a medicine is not the problem, your doctor may recommend a pacemaker. It is important to treat bradycardia so that you don't get more serious health problems. Your doctor will want to see you on a routine schedule to make sure that your heartbeat is normal.  Follow-up care is a key part of your treatment and safety. Be sure to make and go to all appointments, and call your doctor if you are having problems. It's also a good idea to know your test results and keep a list of the medicines you take. How can you care for yourself at home? · Take your medicines exactly as prescribed. Call your doctor if you think you are having a problem with your medicine. If your bradycardia is caused by another disease, your doctor will try to treat the disease. If it is caused by heart medicines, he or she will adjust your medicines. · Make lifestyle changes to improve your heart health. ¨ Get regular exercise. Try for 30 minutes on most days of the week. If you do not have other heart problems, you likely do not have limits on the type or level of activity that you can do. You may want to walk, swim, bike, or do other activities. Ask your doctor what level of exercise is safe for you. ¨ To control your cholesterol, avoid foods with a lot of fat, saturated fat, or sodium. Try to eat more fiber. And if your doctor says it's okay, get some exercise on most days. ¨ Do not smoke. Smoking can make your heart condition worse. If you need help quitting, talk to your doctor about stop-smoking programs and medicines. These can increase your chances of quitting for good.   ¨ Limit alcohol to 2 drinks a day for men and 1 drink a day for women. Too much alcohol can cause health problems. Pacemaker  If you have a pacemaker, you will get more specific information about it. Be sure to:  · Check your pulse as your doctor tells you. · Have your pacemaker checked as often as your doctor recommends. You may be able to do this over the phone or computer. · Avoid strong magnetic or electrical fields. These include wand metal detectors used in airports, MRIs, welding equipment, and generators. · You will be checked several times right after you get your pacemaker and when it is time to have the battery changed. Batteries last for 5 to 15 years. · You can talk on a cell phone. But keep it 6 inches away from your pacemaker. · Microwaves, TVs, radios, and kitchen and bathroom appliances won't harm you. When should you call for help? Call 911 anytime you think you may need emergency care. For example, call if:  ? · You have symptoms of sudden heart failure. These may include:  ¨ Severe trouble breathing. ¨ A fast or irregular heartbeat. ¨ Coughing up pink, foamy mucus. ¨ You passed out. ? · You have symptoms of a stroke. These may include:  ¨ Sudden numbness, tingling, weakness, or loss of movement in your face, arm, or leg, especially on only one side of your body. ¨ Sudden vision changes. ¨ Sudden trouble speaking. ¨ Sudden confusion or trouble understanding simple statements. ¨ Sudden problems with walking or balance. ¨ A sudden, severe headache that is different from past headaches. ?Call your doctor now or seek immediate medical care if:  ? · You have new or changed symptoms of heart failure, such as:  ¨ New or increased shortness of breath. ¨ New or worse swelling in your legs, ankles, or feet. ¨ Sudden weight gain, such as more than 2 to 3 pounds in a day or 5 pounds in a week.  (Your doctor may suggest a different range of weight gain.)  ¨ Feeling dizzy or lightheaded or like you may faint.  ¨ Feeling so tired or weak that you cannot do your usual activities. ¨ Not sleeping well. Shortness of breath wakes you at night. You need extra pillows to prop yourself up to breathe easier. ? Watch closely for changes in your health, and be sure to contact your doctor if:  ? · You do not get better as expected. Where can you learn more? Go to http://michell-everardo.info/. Enter A923 in the search box to learn more about \"Bradycardia: Care Instructions. \"  Current as of: September 21, 2016  Content Version: 11.4  © 6977-3262 [employer]. Care instructions adapted under license by [employer] (which disclaims liability or warranty for this information). If you have questions about a medical condition or this instruction, always ask your healthcare professional. [address] any warranty or liability for your use of this information. Heart Rhythm Problems in Heart Failure: Care Instructions  Your Care Instructions    A heart rhythm problem, or arrhythmia, is a change in the normal rhythm of your heart. Your heart may beat too fast or too slow or beat with an irregular or skipping rhythm. A change in the heart's rhythm may feel like a really strong heartbeat or a fluttering in your chest. A severe heart rhythm problem can keep the body from getting the blood it needs. This can cause shortness of breath, lightheadedness, and fainting. A heart rhythm problem can make your heart failure worse and increase your chance of dying suddenly. You may take medicine to treat your condition. Your doctor may recommend a pacemaker, an implantable cardioverter-defibrillator (ICD), or a procedure called catheter ablation to destroy small parts of the heart that are causing a rhythm problem. Follow-up care is a key part of your treatment and safety. Be sure to make and go to all appointments, and call your doctor if you are having problems.  It's also a good idea to know your test results and keep a list of the medicines you take. How can you care for yourself at home? · Take your medicines exactly as prescribed. Talk to your doctor if you have any problems with your medicines. · If you received a pacemaker or a defibrillator, you will get a fact sheet about it. · Wear a medical alert ID bracelet. You can buy one at most drugstores or order it on the Internet. · Make sure you go to your follow-up appointments. To change your lifestyle  · Do not smoke. · Eat a heart-healthy diet. · Do not drink too much alcohol. Also, get enough sleep, and do not overeat. · Ask your doctor whether you can take over-the-counter medicines (such as decongestants). These can make your heart beat fast.  Be active  · Start light exercise if your doctor says you can. Even a small amount will help you get stronger, have more energy, and manage your stress. · Walk to get exercise easily. Start by walking a little more than you did the day before. Bit by bit, increase the amount you walk. · When you exercise, watch for signs that your heart is working too hard. You are pushing too hard if you cannot talk while you exercise. If you become short of breath or dizzy or have chest pain, sit down and rest.  · If your doctor has not set you up with a cardiac rehabilitation (rehab) program, talk to him or her about whether that is right for you. Cardiac rehab includes exercise, help with diet and lifestyle changes, and emotional support. It may reduce your risk of future heart problems. · Check your pulse daily. Place two fingers on the artery at the palm side of your wrist, in line with your thumb. If your heartbeat seems uneven, talk to your doctor. When should you call for help? Call 911 if you have symptoms of sudden heart failure, such as:  ? · You have severe trouble breathing. ? · You cough up pink, foamy mucus. ? · You have a new irregular or rapid heartbeat.    ?Call 911 if you have symptoms of a heart attack. These may include:  ? · Chest pain or pressure, or a strange feeling in the chest.   ? · Sweating. ? · Shortness of breath. ? · Nausea or vomiting. ? · Pain, pressure, or a strange feeling in the back, neck, jaw, or upper belly or in one or both shoulders or arms. ? · Lightheadedness or sudden weakness. ? · A fast or irregular heartbeat. ? After you call 911, the  may tell you to chew 1 adult-strength or 2 to 4 low-dose aspirin. Wait for an ambulance. Do not try to drive yourself. ?Call your doctor now or seek immediate medical care if:  ? · You have new or increased shortness of breath. ? · You are dizzy or lightheaded, or you feel like you may faint. ? · You have sudden weight gain, such as more than 2 to 3 pounds in a day or 5 pounds in a week. (Your doctor may suggest a different range of weight gain.)   ? · You have increased swelling in your legs, ankles, or feet. ? · You are suddenly so tired or weak that you cannot do your usual activities. ? Watch closely for changes in your health, and be sure to contact your doctor if you develop new symptoms. Where can you learn more? Go to http://michell-everardo.info/. Enter P743 in the search box to learn more about \"Heart Rhythm Problems in Heart Failure: Care Instructions. \"  Current as of: September 21, 2016  Content Version: 11.4  © 5455-3509 [employer]. Care instructions adapted under license by [employer] (which disclaims liability or warranty for this information). If you have questions about a medical condition or this instruction, always ask your healthcare professional. [place] [address] any warranty or liability for your use of this information.     DISCHARGE SUMMARY from Nurse    PATIENT INSTRUCTIONS:    After general anesthesia or intravenous sedation, for 24 hours or while taking prescription Narcotics:  · Limit your activities  · Do not drive and operate hazardous machinery  · Do not make important personal or business decisions  · Do  not drink alcoholic beverages  · If you have not urinated within 8 hours after discharge, please contact your surgeon on call. Report the following to your surgeon:  · Excessive pain, swelling, redness or odor of or around the surgical area  · Temperature over 100.5  · Nausea and vomiting lasting longer than 4 hours or if unable to take medications  · Any signs of decreased circulation or nerve impairment to extremity: change in color, persistent  numbness, tingling, coldness or increase pain  · Any questions    What to do at Home:  Recommended activity: Activity as tolerated,     If you experience any of the following symptoms burning while passing urine, please follow up with provider    *  Please give a list of your current medications to your Primary Care Provider. *  Please update this list whenever your medications are discontinued, doses are      changed, or new medications (including over-the-counter products) are added. *  Please carry medication information at all times in case of emergency situations. These are general instructions for a healthy lifestyle:    No smoking/ No tobacco products/ Avoid exposure to second hand smoke  Surgeon General's Warning:  Quitting smoking now greatly reduces serious risk to your health. Obesity, smoking, and sedentary lifestyle greatly increases your risk for illness    A healthy diet, regular physical exercise & weight monitoring are important for maintaining a healthy lifestyle    You may be retaining fluid if you have a history of heart failure or if you experience any of the following symptoms:  Weight gain of 3 pounds or more overnight or 5 pounds in a week, increased swelling in our hands or feet or shortness of breath while lying flat in bed.   Please call your doctor as soon as you notice any of these symptoms; do not wait until your next office visit. Recognize signs and symptoms of STROKE:    F-face looks uneven    A-arms unable to move or move unevenly    S-speech slurred or non-existent    T-time-call 911 as soon as signs and symptoms begin-DO NOT go       Back to bed or wait to see if you get better-TIME IS BRAIN. Warning Signs of HEART ATTACK     Call 911 if you have these symptoms:   Chest discomfort. Most heart attacks involve discomfort in the center of the chest that lasts more than a few minutes, or that goes away and comes back. It can feel like uncomfortable pressure, squeezing, fullness, or pain.  Discomfort in other areas of the upper body. Symptoms can include pain or discomfort in one or both arms, the back, neck, jaw, or stomach.  Shortness of breath with or without chest discomfort.  Other signs may include breaking out in a cold sweat, nausea, or lightheadedness. Don't wait more than five minutes to call 911 - MINUTES MATTER! Fast action can save your life. Calling 911 is almost always the fastest way to get lifesaving treatment. Emergency Medical Services staff can begin treatment when they arrive -- up to an hour sooner than if someone gets to the hospital by car. The discharge information has been reviewed with the patient and guardian. The patient and guardian verbalized understanding. Discharge medications reviewed with the patient and guardian and appropriate educational materials and side effects teaching were provided.   ___________________________________________________________________________________________________________________________________

## 2017-11-01 NOTE — PROGRESS NOTES
Problem: Mobility Impaired (Adult and Pediatric)  Goal: *Acute Goals and Plan of Care (Insert Text)  STG's to be addressed within 1 day:    1. Bed mobility:  Rolling L to R to L min A for positioning. 2.  Transfers:  Supine<-->sit min A with HR for ADL's.  3.  Activity tolerance: Tolerate 5-15 minutes EOB for improved activity. LTG's to be addressed within 3 days:    1. Transfers:  Sit<-->stand<-->chair max/mod A with RW for ADL's.   2.  Gait:  Patient to ambulate 5 ft max/mod A with RW, no LOB for ADL's. Outcome: Progressing Towards Goal  physical Therapy EVALUATION    Patient: Tigre Dunn (80 y.o. female)  Date: 11/1/2017  Primary Diagnosis: Hypotension  Bradycardia  UTI (urinary tract infection)  Precautions:   Fall    ASSESSMENT :  Based on the objective data described below, the patient presents to PT with decreased functional mobility with regard to bed mobility, transfers, gait and overall tolerance for activity. Patient extremely confused, (+) h/o advanced dementia. Unable to ascertain PLOF due to significant cognitive impairment. Patient very restless, attempted to exit bed upon PT arrival for evaluation. Patient demonstrates inconsistent command following, noted B knee flexion contracture, lacking ~ 20 degrees of full extension. Patient required significant verbal/tactile/manual cues for safety with bed mobility and transitioning to sit EoB. Patient with fair/poor sitting balance. Patient with very limited sitting tolerance, began increasingly more restless with sitting EoB, assisted patient back to bed, positioned for comfort. Placed patient on bed alarm for safety. Patient significantly limited by cognitive impairments, will trial patient x 3 days to assess for functional mobility. Patient will benefit from skilled intervention to address the above impairments.   Patients rehabilitation potential is considered to be Guarded  Factors which may influence rehabilitation potential include:   []         None noted  [x]         Mental ability/status  [x]         Medical condition  []         Home/family situation and support systems  [x]         Safety awareness  []         Pain tolerance/management  []         Other:      PLAN :  Recommendations and Planned Interventions:  [x]           Bed Mobility Training             [x]    Neuromuscular Re-Education  [x]           Transfer Training                   []    Orthotic/Prosthetic Training  [x]           Gait Training                          []    Modalities  [x]           Therapeutic Exercises          []    Edema Management/Control  [x]           Therapeutic Activities            [x]    Patient and Family Training/Education  []           Other (comment):    Frequency/Duration: Patient will be followed by physical therapy daily x 3 days to address goals. Discharge Recommendations: To Be Determined  Further Equipment Recommendations for Discharge: To be determined     SUBJECTIVE:   Patient stated Hello.     OBJECTIVE DATA SUMMARY:     Past Medical History:   Diagnosis Date    Arthritis     Breast cancer ([facility] [address] 2007    right breast (radiation)    Diabetes ([facility] [address]     diet controlled    GERD (gastroesophageal reflux disease)     Hypertension     Memory changes      Past Surgical History:   Procedure Laterality Date    HX APPENDECTOMY      HX BREAST BIOPSY      HX CHOLECYSTECTOMY       Barriers to Learning/Limitations: yes;  cognitive and altered mental status (Confusion)  Compensate with: Visual Cues, Verbal Cues, Tactile Cues and Kinesthetic Cues  Prior Level of Function/Home Situation:   Home Situation  Home Environment:  (Unknown)  # Steps to Enter: 0  One/Two Story Residence: One story  Living Alone: No  Support Systems: Child(ellen)  Patient Expects to be Discharged to[de-identified] Unknown  Current DME Used/Available at Home:  (Unknown)  Critical Behavior:  Neurologic State: Alert;Confused; Restless  Psychosocial  Patient Behaviors: Confused; Restless  Purposeful Interaction: Yes  Pt Identified Daily Priority: Clinical issues (comment); Communication issues (comment)  Caritas Process: Nurture loving kindness;Establish trust;Nurture spiritual self;Teaching/learning; Attend basic human needs;Create healing environment;Creative use of self  Caring Interventions: Reassure; Therapeutic modalities  Reassure: Therapeutic listening; Informing; Acceptance;Quiet presence; Sit with patient;Caring rounds;Story tellings  Therapeutic Modalities: Deep breathing; Intentional therapeutic touch  Strength:    Strength: Generally decreased, functional (B LE 2/5)  Tone & Sensation:   Tone: Abnormal (B LE, mild rigidity noted)  Sensation: Intact (B LE intact to LT)   Range Of Motion:  AROM: Generally decreased, functional (B knee flexion contractures)  Functional Mobility:  Bed Mobility:  Rolling: Moderate assistance; Additional time  Supine to Sit: Minimum assistance; Additional time  Sit to Supine: Moderate assistance; Additional time  Scooting: Maximum assistance; Additional time  Transfers:  Sit to Stand:  (N/A)  Balance:   Sitting: Impaired; With support  Sitting - Static: Fair (occasional)  Sitting - Dynamic: Poor (constant support)  Standing:  (N/A)  Ambulation/Gait Training:  Ambulation - Level of Assistance:  (N/A)  Pain:  Pain Scale 1: Visual  Pain Intensity 1: 0  Activity Tolerance:   Fair  Please refer to the flowsheet for vital signs taken during this treatment. After treatment:   [] Patient left in no apparent distress sitting up in chair  [] Patient left sitting on EOB  [x] Patient left in no apparent distress in bed  [] Patient declined to be OOB at this time due to   [x] Call bell left within reach  [] Nursing notified  [] Caregiver present  [x] Bed alarm activated    COMMUNICATION/EDUCATION:   []         Fall prevention education was provided and the patient/caregiver indicated understanding.   []         Patient/family have participated as able in goal setting and plan of care. []         Patient/family agree to work toward stated goals and plan of care. []         Patient understands intent and goals of therapy, but is neutral about his/her participation. [x]         Patient is unable to participate in goal setting and plan of care. Thank you for this referral.  Fariba Casas, PT   Time Calculation: 12 mins     G-codes:  Mobility  Current  CM= 80-99%   Goal  CJ= 20-39%. The severity rating is based on the Level of Assistance required for Functional Mobility and ADLs.     Eval Complexity: History: HIGH Complexity :3+ comorbidities / personal factors will impact the outcome/ POC Exam:HIGH Complexity : 4+ Standardized tests and measures addressing body structure, function, activity limitation and / or participation in recreation  Presentation: LOW Complexity : Stable, uncomplicated Overall Complexity:LOW

## 2017-11-01 NOTE — PROGRESS NOTES
[facility] - Progress Note  Admit Date: 10/30/2017    Assessment:     Hospital Problems  Date Reviewed: 7/17/2017          Codes Class Noted POA    Bradycardia ICD-10-CM: R00.1  ICD-9-CM: 427.89  10/30/2017 Yes        Hypotension ICD-10-CM: I95.9  ICD-9-CM: 458.9  10/30/2017 Yes        UTI (urinary tract infection) ICD-10-CM: N39.0  ICD-9-CM: 599.0  10/24/2016 Unknown              -Altered mental status/altered level of consciousness, likely multifactorial in setting of below.  -Urinary tract infection. On abx.  -Chronic sinus bradycardia, previously intolerant to BB, on verapamil as outpatient. No significant pauses overnight.  -Hypertension (on verapamil and lisinopril as outpatient) with hypotension on arrival in setting of UTI, improved with IV fluids.  -Diabetes mellitus. Hgb A1c 5.2.  -Hyperlipidemia. On statin. -H/o questionable afib per records but sinus rhythm this admission.  -Abnormal ECG. 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 Troponin unremarkable. Normal LV function with EF 55-60% by echo this admission. -Advanced age, dementia, full code.     Not followed routinely by cardiology. Plan:     Echo with normal EF. BP stable overnight. HR remains about 50. No significant pauses overnight. No indication for PPM. Avoid AV nikkie agents. Continue work-up and treatment of underlying urologic process. No further cardiac work-up at this time. Subjective:     No CP. No SOB.     Objective:      Patient Vitals for the past 8 hrs:   Temp Pulse Resp BP SpO2   11/01/17 0430 97.4 °F (36.3 °C) 62 18 128/82 98 %         Patient Vitals for the past 96 hrs:   Weight   11/01/17 0519 59.1 kg (130 lb 6.4 oz)   10/31/17 0618 59 kg (130 lb 1.6 oz)   10/31/17 0110 59 kg (130 lb 1.6 oz)   10/30/17 2106 59 kg (130 lb 1.6 oz)   10/30/17 1336 63.2 kg (139 lb 4.8 oz)                    Intake/Output Summary (Last 24 hours) at 11/01/17 0958  Last data filed at 11/01/17 0935   Gross per 24 hour   Intake              890 ml   Output              400 ml   Net              490 ml       Physical Exam:  General:  no distress  Neck:  no JVD  Lungs:  clear to auscultation bilaterally  Heart:  regular rate and rhythm  Abdomen:  abdomen is soft without significant tenderness, masses, organomegaly or guarding  Extremities:  extremities normal, atraumatic, no cyanosis or edema    Data Review:     Labs: Results:       Chemistry Recent Labs      11/01/17   0211  10/30/17   1335   GLU  99  100*   NA  139  139   K  4.0  4.1   CL  107  106   CO2  26  26   BUN  19*  26*   CREA  0.79  0.77   CA  8.8  8.8   MG  1.8   --    AGAP  6  7   BUCR  24*  34*   AP   --   69   TP   --   7.3   ALB   --   2.8*   GLOB   --   4.5*   AGRAT   --   0.6*      CBC w/Diff Recent Labs      11/01/17   0211  10/30/17   1335   WBC  7.5  3.6*   RBC  3.22*  3.44*   HGB  10.0*  10.6*   HCT  30.1*  32.3*   PLT  220  208   GRANS  85*  57   LYMPH  9*  30   EOS  0  1      Cardiac Enzymes No results found for: CPK, CK, CKMMB, CKMB, RCK3, CKMBT, CKNDX, CKND1, LUIGI, TROPT, TROIQ, NICANOR, TROPT, TNIPOC, BNP, BNPP   Coagulation Recent Labs      10/30/17   1335   PTP  13.0   INR  1.0       Lipid Panel Lab Results   Component Value Date/Time    Cholesterol, total 142 10/25/2016 02:01 AM    HDL Cholesterol 65 10/25/2016 02:01 AM    LDL, calculated 65.2 10/25/2016 02:01 AM    VLDL, calculated 11.8 10/25/2016 02:01 AM    Triglyceride 59 10/25/2016 02:01 AM    CHOL/HDL Ratio 2.2 10/25/2016 02:01 AM      BNP No results found for: BNP, BNPP, XBNPT   Liver Enzymes Recent Labs      10/30/17   1335   TP  7.3   ALB  2.8*   AP  69   SGOT  19      Digoxin    Thyroid Studies Lab Results   Component Value Date/Time    TSH 1.39 10/30/2017 05:50 PM          Signed By: DIAN Lim     November 1, 2017

## 2017-11-01 NOTE — PROGRESS NOTES
Care Management Interventions  PCP Verified by CM: Yes (Amina Pizano)  Mode of Transport at Discharge: [address]  [address] Time of Discharge: [address] (CM Consult): [address]: No  Reason Outside [place]: Patient already serviced by other home care/hospice agency (In [facility])  Discharge Durable Medical Equipment: No  Physical Therapy Consult: Yes  Occupational Therapy Consult: Yes  Current Support Network: Other (daughter, Janna Martinez, lives with pt)  Plan discussed with Pt/Family/Caregiver: Yes  Freedom of Choice Offered: Yes  Discharge Location  Discharge Placement: Home with home health     Pt scheduled for transport home via [address] at 1530 today.   In [facility] care notitfied of pt's D/C

## 2017-11-02 ENCOUNTER — PATIENT OUTREACH (OUTPATIENT)
Dept: FAMILY MEDICINE CLINIC | Age: 82
End: 2017-11-02

## 2017-11-02 NOTE — PROGRESS NOTES
Transition of [address] [place] [address] [place] Discharge 11-1-17    Patient Admitted:  10/30/17 to [facility]  Patient Discharged 11-1-17   Discharge Disposition:  Home with [address]    Per EMR Review:     Admission Chief Complaint:   Altered Mental Status/Worsening Confusion     Discharge Diagnosis:  Not Available at this time    Per Attending Physician progress note of 10-31-17  By Dr. Jackie Amanda as follows:   1. Urinary tract infection. 2. Advanced dementia without evidence of acute encephalopathy  3. Bradycardia. 4. Hypotension. * See progress note of 10-31-17 by Dr. Lindsay Dee for additional information     RRAT:   32      Nurse Navigator spoke with patient's daughter Ms. Noble Gould via telephone call. Patient's daughter related that patient is unable to speak for herself. However, Ms. Mckenzie Felix (daughter) reports that patient is eating lunch and watching TV at the current time. Ms. Luis A Hi related that home care services have been initiated. Nurse has been out to the home for the initial home visit. Ms. Moustapha Arndt related that patient has two personal care aides that provide assistance. One personal care aide works Mon-Fri from 7:00 am until 10:00 pm. The second aide works 7 days a week from 6:00 pm until 9:00 pm.   Ms. Mckenzie Felix related that she is in the home as well. DME:  Patient has a wheelchair    Medication:   Ms. Mckenzie Felix related that two medications have been stopped. These are the Verapamil and the Robaxin. Medication Rec. Completed with Ms. Mckenzie Felix    Transportation: Patient has 6 round trip or 12 one way trips available for transportation. If patient has used all of these then patient uses the [employer] bus system. Follow Up Appointments:   - 11/9/2017 with Dr. Kwesi Faye PCP  - Ms. Mckenzie Felix states that her mother has two eye physician appointments this month.  One is with her regular eye doctor and the other is with a retina specialist. Dr. Bishnu Joel is the retina specialist.      Red Flags:     Nurse Navigator reviewed signs/Symptoms of Sepsis:   - Fever/Chills  - Decreased urination  - Breathing Fast  - Rapid heart rate  Ms. Harsh Cobb asked to please follow up with PCP office or the emergency room should patient experience any of these signs of infection. Med. Reconciliation:  completed  Allergies: Reviewed    Ms. Harsh Cobb stated that she has the discharge paperwork to include info. Re: Bradycardia and Heart Rhythm Problems and is reviewing it for a second time. Ms.A. Eugene Villar does not have any concerns or questions at this time. Ms. Harsh Cobb alerted that the physician office (PCP) is open 8:00 am until 6:30 pm and there is someone on call after hours and on the weekend should she have any concerns or questions. Nurse Navigator contact information provided for follow up as needed. Ms. Harsh Cobb is agreeable to a follow up telephone call next week. Goals      Prevent complications post hospitalization.            - Attend physician Appointments     - Dr. Rachel Daniels, PCP  11/9/17 @ 3:30 pm . Appt. Date and time reviewed with patient's daughter       [address] [place] [address] Supportive resources in place to maintain patient in the community (ie., home health, equipment, DME, refer to, etc.)            [address] to provide home care services to include SN, PT, OT    - Start of care verified           Nurse Navigator spoke with Nir with [address] Start of Care to begin today. Orders have been received for SN, PT, and OT    Plan:   1) Follow up with status of patient and established goals   2) Follow up with patient goal  3) Continue assessment and interventions as needed. 4) Follow up with patient attending PCP appointment       and outcome.

## 2017-11-02 NOTE — ROUTINE PROCESS
Discharged for home. Discharge instruction and prescription given.  Left department with ambulance service

## 2017-11-05 LAB
BACTERIA SPEC CULT: NORMAL
BACTERIA SPEC CULT: NORMAL
SERVICE CMNT-IMP: NORMAL
SERVICE CMNT-IMP: NORMAL

## 2017-11-08 ENCOUNTER — PATIENT OUTREACH (OUTPATIENT)
Dept: FAMILY MEDICINE CLINIC | Age: 82
End: 2017-11-08

## 2017-11-08 NOTE — PROGRESS NOTES
Transition of [address] [place] [address] [facility] 11-1-17     Patient Admitted:  10/30/17 to [facility]  Patient Discharged 11-1-17   Discharge Disposition:  Home with [address]     Per EMR Review:      Admission Chief Complaint:   Altered Mental Status/Worsening Confusion      Per Attending Physician progress note of 10-31-17  By Dr. Miguelina Long as follows:   1. Urinary tract infection. 2. Advanced dementia without evidence of acute encephalopathy  3. Bradycardia. 4. Hypotension. * See progress note of 10-31-17 by Dr. Sushila Jaimes for additional information      RRAT:   27    Medication:  Patient's daughter states patient has completed the course of antibiotics. Patient's daughter states patient is moving her bowels more. She will follow up with physician tomorrow at PCP appt. Patient's daughter states that she followed up with home health agency[facility], in regards to drawing lab work for patient. Goals      Prevent complications post hospitalization.            - Attend physician Appointments   - Dr. Linette Anguiano, PCP  11/9/17 @ 3:30 pm . Appt. Date and time reviewed with patient's daughter  11-8-17:  Patient's daughter is aware of appointment scheduled for 11/9/17 and has arranged transportation. Plan: Follow up with medical appointment and outcome. 11-8-17:  Patient's daughter, Ms. Jem Walton reports that she is encouraging patient to drink water and keep hydrated. Nurse Navigator to follow up with patient's daughter within the next 7-10 days.  Supportive resources in place to maintain patient in the community (ie., home health, equipment, DME, refer to, etc.)            [address] to provide home care services to include SN, PT, OT    - Start of care verified     11-8-17:  Patient's daughter Ms. Jem Walton reports that nursing and PT services are in place.   - Nurse Navigator called [address]  [address] staff confirm that OT  eval. Has been completed. Plan: Continue to monitor home health services. Contact  agency, [facility],  [facility] Home as  needed. Continue to monitor and assess for additional supportive servicers that might  be beneficial to patient/family caregiver.  Understands red flags post discharge. 11-8-17:  Reviewed signs and symptoms of infection to include: fever/chills, decreased urination, breathing fast, fast pulse. Please follow up with PCP/ED if these symptoms are present. ACP:  Not on file. Patient dx with dementia. Nurse Navigator will follow up to see if patient had previously completed an ACP      Nurse Navigator to continue to follow case.  .

## 2017-11-15 ENCOUNTER — PATIENT OUTREACH (OUTPATIENT)
Dept: FAMILY MEDICINE CLINIC | Age: 82
End: 2017-11-15

## 2017-11-15 NOTE — PROGRESS NOTES
Transition of Care    Patient Admitted:  10/30/17 to [facility]  Patient Discharged 11-1-17   Discharge Disposition:  Home with [address]     Per EMR Review:      Admission Chief Complaint:   Altered Mental Status/Worsening Confusion      Discharge Diagnosis:  Not Available at this time     Per Attending Physician progress note of 10-31-17  By Dr. Damaris Hodges as follows:   1. Urinary tract infection. 2. Advanced dementia without evidence of acute encephalopathy  3. Bradycardia. 4. Hypotension. * See progress note of 10-31-17 by Dr. Eloisa Villalta for additional information          Patient's PCP appointment on 11-9-17 was not attended. Patient's daughter called nurse navigator on 11-9-17 and stated that patient did not attend PCP appointment because transportation did not arrive. EMR reviewed and PCP appointment has been rescheduled for 11-21-17. Nurse Navigator spoke with patient's daughter Ms. Olivia Webb via telephone call. Ms Olivia Webb, related  That patient \" is till doing good\". \"She is to see the eye doctor today\". Ms. Tong Morales reports that patient is More or less stable with that (Diabetes). Patient was diagnosed in the 1970's with diabetes and it is controlled by diet. Ms. Tong Morales checks patient's blood sugar. However, she states that the glucose monitoring machine recently stopped working. She states they have had the machine for a long time. Ms. Prabha Julian was asked to follow up with physician office for a prescription for a new glucometer. Ms. Prabha Julian related that patient has had cataract surgery. Patient  complains she can't see well. Also, she has been told by the eye doctor that she has a leak in her retina. Patient receives eye injections. Patient is to see her regular eye doctor on the 27th. Ms. Olivia Webb reports that  Patient drops off to sleep in the morning after breakfast. She is able to be awakened.  About 11:00 wakes up more and is tired in the evenings. There is an aide in the daytime and the evenings to provide assistance with ADLS and getting ready for bed etc. The morning aide is present until 10 am.       Per Ms. Roc Toledo (patient's daughter)  Patient is receiving physical therapy. Can transfer from chair to couch. Needs help with toileting. Can't stand too long. Patient has a BSC. [address] Physical therapy continue per patient's daughter. Ms. Roc Toledo reports that  Stool back to normal   Urination is  Moderate, light yellow color. Patient's daughter asked to follow up with PCP re:   Patient falling asleep after breakfast.   Ms. Kendrick Falcon encouraged to discuss this with the   Home care nurse. Ms. Bertha Asher states that the nurse checks patient's pulse and blood pressure when she visits. Patient's daughter alerted that the PCP office is open until 6:30 pm in the evenings. If she should have any questions or concerns after hours there is an on call provider that she can reach via phone call. Ms.A. Bertha Asher, patient's daughter encouraged to utilize the ED as needed. Ms. Kendrick Falcon educated in regards to signs/symptoms of infection such as fever, fast heart rate, fast breathing, change in mental status. Please follow up with physician or ED if these symptoms are noted. Ms. Kendrick Falcon voiced understanding. Ms. Bertha Asher praised for keeping up with patient's appointments. Ms. Kendrick Falcon agreeable with follow up telephone call next week. Goals      Prevent complications post hospitalization.            - Attend physician Appointments   - Dr. Mechelle Humphries, PCP  11/9/17 @ 3:30 pm . Appt. Date and time reviewed with patient's daughter  11-8-17:  Patient's daughter is aware of appointment scheduled for 11/9/17 and has arranged transportation. Plan: Follow up with medical appointment and outcome. 11-8-17:  Patient's daughter, Ms. Kendrick Falcon reports that she is encouraging patient to drink water and keep hydrated. Nurse Navigator to follow up with patient's daughter within the next 7-10 days. 11-9-17. PCP appointment was not kept due to transportation not arriving as scheuduled per patient's daughter. 11-15-17: PCP appointment scheduled for 11-21-17.     11-15-17:  Eye appointment for follow up with retina specialist scheduled for this date per patient's daughter. 11-15-17: Regular eye doctor follow up appointment scheduled for 11-27-17.  Supportive resources in place to maintain patient in the community (ie., home health, equipment, DME, refer to, etc.)            [address] to provide home care services to include SN, PT, OT    - Start of care verified     11-8-17:  Patient's daughter Ms. Sonya Huerta reports that nursing and PT services are in place.   - Nurse Navigator called [address] staff confirm that OT  eval. Has been completed. Plan: Continue to monitor home health services. Contact HH agency, [facility] as  needed. Continue to monitor and assess for additional supportive servicers that might  be beneficial to patient/family caregiver.  Understands red flags post discharge. 11-8-17:  Reviewed signs and symptoms of infection to include: fever/chills, decreased urination, breathing fast, fast pulse. Please follow up with PCP/ED if these symptoms are present. 11-15-17:  Reviewed signs and symptoms of infection with patient's daughter. ACP:  Patient dx with advanced dementia. Nurse Navigator will follow up with patient's             Daughter to see if an ACP had previously been               completed.

## 2017-11-15 NOTE — Clinical Note
Dr. Davi Lagos: Patient has an appt. Scheduled for 11-21-17. Her daughter states that the glucometer is not working. Would you mind giving her a prescription for another glucometer and test strips. Also, daughter stated that patient falls asleep after breakfast. She is able to be awakened. Then perks up around 11.  Just an FYI. .... Thank you [facility] Nurse Navigator.

## 2017-11-21 ENCOUNTER — OFFICE VISIT (OUTPATIENT)
Dept: FAMILY MEDICINE CLINIC | Age: 82
End: 2017-11-21

## 2017-11-21 ENCOUNTER — PATIENT OUTREACH (OUTPATIENT)
Dept: FAMILY MEDICINE CLINIC | Age: 82
End: 2017-11-21

## 2017-11-21 VITALS
HEIGHT: 67 IN | OXYGEN SATURATION: 99 % | BODY MASS INDEX: 20.4 KG/M2 | HEART RATE: 54 BPM | WEIGHT: 130 LBS | RESPIRATION RATE: 16 BRPM | SYSTOLIC BLOOD PRESSURE: 113 MMHG | TEMPERATURE: 97.2 F | DIASTOLIC BLOOD PRESSURE: 65 MMHG

## 2017-11-21 DIAGNOSIS — N39.0 URINARY TRACT INFECTION WITHOUT HEMATURIA, SITE UNSPECIFIED: ICD-10-CM

## 2017-11-21 DIAGNOSIS — I10 ESSENTIAL HYPERTENSION: Primary | ICD-10-CM

## 2017-11-21 DIAGNOSIS — R00.1 SINUS BRADYCARDIA: ICD-10-CM

## 2017-11-21 NOTE — PATIENT INSTRUCTIONS
Decrease lisinopril to 5 mg daily. Follow up with cardiologist for continued slow heart rate. Drop off urine as soon as you can         Bradycardia: Care Instructions  Your Care Instructions    Bradycardia is a slow heart rate. If your heart beats too slowly, it can't supply your body with enough blood. This can make you weak or dizzy. Or it may make you pass out. Sometimes medicine can cause this problem. If this happens, your doctor may have you adjust one of your medicines. If a medicine is not the problem, your doctor may recommend a pacemaker. It is important to treat bradycardia so that you don't get more serious health problems. Your doctor will want to see you on a routine schedule to make sure that your heartbeat is normal.  Follow-up care is a key part of your treatment and safety. Be sure to make and go to all appointments, and call your doctor if you are having problems. It's also a good idea to know your test results and keep a list of the medicines you take. How can you care for yourself at home? · Take your medicines exactly as prescribed. Call your doctor if you think you are having a problem with your medicine. If your bradycardia is caused by another disease, your doctor will try to treat the disease. If it is caused by heart medicines, he or she will adjust your medicines. · Make lifestyle changes to improve your heart health. ¨ Get regular exercise. Try for 30 minutes on most days of the week. If you do not have other heart problems, you likely do not have limits on the type or level of activity that you can do. You may want to walk, swim, bike, or do other activities. Ask your doctor what level of exercise is safe for you. ¨ To control your cholesterol, avoid foods with a lot of fat, saturated fat, or sodium. Try to eat more fiber. And if your doctor says it's okay, get some exercise on most days. ¨ Do not smoke. Smoking can make your heart condition worse.  If you need help quitting, talk to your doctor about stop-smoking programs and medicines. These can increase your chances of quitting for good. ¨ Limit alcohol to 2 drinks a day for men and 1 drink a day for women. Too much alcohol can cause health problems. Pacemaker  If you have a pacemaker, you will get more specific information about it. Be sure to:  · Check your pulse as your doctor tells you. · Have your pacemaker checked as often as your doctor recommends. You may be able to do this over the phone or computer. · Avoid strong magnetic or electrical fields. These include wand metal detectors used in airports, MRIs, welding equipment, and generators. · You will be checked several times right after you get your pacemaker and when it is time to have the battery changed. Batteries last for 5 to 15 years. · You can talk on a cell phone. But keep it 6 inches away from your pacemaker. · Microwaves, TVs, radios, and kitchen and bathroom appliances won't harm you. When should you call for help? Call 911 anytime you think you may need emergency care. For example, call if:  ? · You have symptoms of sudden heart failure. These may include:  ¨ Severe trouble breathing. ¨ A fast or irregular heartbeat. ¨ Coughing up pink, foamy mucus. ¨ You passed out. ? · You have symptoms of a stroke. These may include:  ¨ Sudden numbness, tingling, weakness, or loss of movement in your face, arm, or leg, especially on only one side of your body. ¨ Sudden vision changes. ¨ Sudden trouble speaking. ¨ Sudden confusion or trouble understanding simple statements. ¨ Sudden problems with walking or balance. ¨ A sudden, severe headache that is different from past headaches. ?Call your doctor now or seek immediate medical care if:  ? · You have new or changed symptoms of heart failure, such as:  ¨ New or increased shortness of breath. ¨ New or worse swelling in your legs, ankles, or feet.   ¨ Sudden weight gain, such as more than 2 to 3 pounds in a day or 5 pounds in a week. (Your doctor may suggest a different range of weight gain.)  ¨ Feeling dizzy or lightheaded or like you may faint. ¨ Feeling so tired or weak that you cannot do your usual activities. ¨ Not sleeping well. Shortness of breath wakes you at night. You need extra pillows to prop yourself up to breathe easier. ? Watch closely for changes in your health, and be sure to contact your doctor if:  ? · You do not get better as expected. Where can you learn more? Go to http://michell-everardo.info/. Enter N744 in the search box to learn more about \"Bradycardia: Care Instructions. \"  Current as of: September 21, 2016  Content Version: 11.4  © 2444-6222 [employer]. Care instructions adapted under license by [employer] (which disclaims liability or warranty for this information). If you have questions about a medical condition or this instruction, always ask your healthcare professional. [address] any warranty or liability for your use of this information.

## 2017-11-21 NOTE — PROGRESS NOTES
Transition of Care     Patient Admitted:  10/30/17 to  [facility]  Patient Discharged 11-1-17   Discharge Disposition:  Home with [address] [place], [place] [address]e[address] spoke with patient's daughter Ms. Keysha Mead.  related, \"She (patient) is ok\"      Goals      Prevent complications post hospitalization.            - Attend physician Appointments   - Dr. Roman Paez, PCP  11/9/17 @ 3:30 pm . Appt. Date and time reviewed with patient's daughter  11-8-17:  Patient's daughter is aware of appointment scheduled for 11/9/17 and has arranged transportation. Plan: Follow up with medical appointment and outcome. 11-8-17:  Patient's daughter, Ms. Shalini Lancaster reports that she is encouraging patient to drink water and keep hydrated. Nurse Navigator to follow up with patient's daughter within the next 7-10 days. 11-9-17. PCP appointment was not kept due to transportation not arriving as scheuduled per patient's daughter. 11-15-17: PCP appointment scheduled for 11-21-17.     11-15-17:  Eye appointment for follow up with retina specialist scheduled for this date per patient's daughter. 11-15-17: Regular eye doctor follow up appointment scheduled for 11-27-17.     11-21-17:  Nurse Navigator spoke with patient's daughter Ms. Keysha Mead. Ms. Chioma Martin states that patient has a PCP appointment scheduled for today.  Supportive resources in place to maintain patient in the community (ie., home health, equipment, DME, refer to, etc.)            [address] to provide home care services to include SN, PT, OT    - Start of care verified     11-8-17:  Patient's daughter Ms. Shalini Lancaster reports that nursing and PT services are in place.   - Nurse Navigator called [address] staff confirm that OT  eval. Has been completed. Plan: Continue to monitor home health services. Contact  agency, [facility],  In Home as  needed.   Continue to monitor and assess for additional supportive servicers that might  be beneficial to patient/family caregiver. 11-21-17:  Home care services continue. Patient's daughter states that PT visited with patient today.  Understands red flags post discharge. 11-8-17:  Reviewed signs and symptoms of infection to include: fever/chills, decreased urination, breathing fast, fast pulse. Please follow up with PCP/ED if these symptoms are present. 11-15-17:  Reviewed signs and symptoms of infection with patient's daughter.          Nurse Navigator will continue to follow case.   Bright Souza

## 2017-11-21 NOTE — MR AVS SNAPSHOT
Visit Information Date & Time Provider Department Dept. Phone Encounter #  
 11/21/2017  3:00 PM Angelina Lilliam Rdz [phone] [id number] Follow-up Instructions Return in about 2 months (around 1/21/2018) for hypertension. Your Appointments 1/23/2018 12:30 PM  
FOLLOW UP EXAM with MD Lilliam Lawler [address] [place] [address]) Appt Note: 2 month follow up  
 [address] [facility] [address]  
[facility] [address]  
  
    
 4/23/2018 11:00 AM  
Follow Up with William Bruno MD  
[address] [place] [address]) Appt Note: 6mon f/u; pt daughter aware to provide Balbir George for this visit [address] [facility] [address]  
[phone]  
  
   
 [facility] [address]  
  
    
 9/11/2018 12:00 PM  
Office Visit with DIAN Driscoll ([address]) Appt Note: [address] [facility] [address]  
[facility] [address] Upcoming Health Maintenance Date Due  
 FOOT EXAM Q1 9/29/2015 EYE EXAM RETINAL OR DILATED Q1 5/11/2016 Pneumococcal 65+ High/Highest Risk (2 of 2 - PPSV23) 11/4/2016 MICROALBUMIN Q1 4/12/2017 GLAUCOMA SCREENING Q2Y 5/11/2017 LIPID PANEL Q1 10/25/2017 HEMOGLOBIN A1C Q6M 1/17/2018 MEDICARE YEARLY EXAM 9/12/2018 DTaP/Tdap/Td series (2 - Td) 9/9/2026 Allergies as of 11/21/2017  Review Complete On: 11/21/2017 By: Vandana Weiner LPN Severity Noted Reaction Type Reactions Pcn [Penicillins]  03/05/2013    Other (comments) Unknown-happened when she was very young Current Immunizations  Reviewed on 10/19/2016 Name Date Influenza High Dose Vaccine PF 9/11/2017, 9/9/2016 Influenza Vaccine 12/29/2014, 12/26/2013 Not reviewed this visit You Were Diagnosed With   
  
 Codes Comments Essential hypertension    -  Primary ICD-10-CM: I10 
ICD-9-CM: 401.9 Sinus bradycardia     ICD-10-CM: R00.1 ICD-9-CM: 427.89 Urinary tract infection without hematuria, site unspecified     ICD-10-CM: N39.0 ICD-9-CM: 599.0 Vitals BP Pulse Temp Resp Height(growth percentile) Weight(growth percentile) 113/65 (BP 1 Location: Right arm, BP Patient Position: Sitting) (!) 54 97.2 °F (36.2 °C) (Oral) 16 5' 7\" (1.702 m) 130 lb (59 kg) SpO2 BMI OB Status Smoking Status 99% 20.36 kg/m2 Postmenopausal Never Smoker BMI and BSA Data Body Mass Index Body Surface Area  
 20.36 kg/m 2 1.67 m 2 Preferred Pharmacy Pharmacy Name Phone [address] [facility] [address] Your Updated Medication List  
  
   
This list is accurate as of: 11/21/17  3:11 PM.  Always use your most recent med list.  
  
  
  
  
 ALPHAGAN P 0.1 % ophthalmic solution Generic drug:  brimonidine  
two (2) times a day. aspirin delayed-release 81 mg tablet  
take 1 tablet by mouth once daily  
  
 atorvastatin 80 mg tablet Commonly known as:  LIPITOR  
take 1 tablet by mouth once daily at bedtime  
  
 calcium carbonate 600 mg calcium (1,500 mg) tablet Commonly known as:  CALCIUM CARBONATE Take 1 Tab by mouth daily. take 1 tablet by mouth twice a day  
  
 ferrous sulfate 325 mg (65 mg iron) tablet Take 1 Tab by mouth two (2) times a day. glucosamine-chondroit-vit c-mn 500-400 mg capsule Take 1 Cap by mouth two (2) times a day. * glucose blood VI test strips strip Commonly known as:  ACCU-CHEK DINH PLUS TEST STRP For daily glucose checks * glucose blood VI test strips strip Commonly known as:  ACCU-CHEK DINH PLUS TEST STRP For daily glucose checks  
  
 hydrocortisone 1 % lotion Commonly known as:  ALA-DEEDEE  
 Apply  to affected area two (2) times a day. use thin layer Lancets Misc Commonly known as:  ACCU-CHEK MULTICLIX LANCET  
100 accu-chek lancets  
  
 latanoprost 0.005 % ophthalmic solution Commonly known as:  Miko Slimmer Administer 1 Drop to both eyes nightly. lisinopril 10 mg tablet Commonly known as:  PRINIVIL, ZESTRIL  
take 1 tablet by mouth once daily  
  
 memantine 10 mg tablet Commonly known as:  NAMENDA  
1 q am  
  
 multivitamin tablet Commonly known as:  DAILY MULTIPLE Take 1 Tab by mouth daily. * OTHER Check CBC, CMP, Mg in 4 days, results to PCP immediately, Diagnosis- UTI  
  
 * OTHER Incentive spirometry- use as directed  
  
 raNITIdine 150 mg tablet Commonly known as:  ZANTAC  
take 1 tablet by mouth twice a day SYSTANE (PROPYLENE GLYCOL) 0.4-0.3 % Drop Generic drug:  peg 400-propylene glycol Administer  to left eye as needed. * Notice: This list has 4 medication(s) that are the same as other medications prescribed for you. Read the directions carefully, and ask your doctor or other care provider to review them with you. We Performed the Following AMB POC URINALYSIS DIP STICK AUTO W/O MICRO [45878 CPT(R)] Follow-up Instructions Return in about 2 months (around 1/21/2018) for hypertension. Patient Instructions Decrease lisinopril to 5 mg daily. Follow up with cardiologist for continued slow heart rate. Drop off urine as soon as you can Bradycardia: Care Instructions Your Care Instructions Bradycardia is a slow heart rate. If your heart beats too slowly, it can't supply your body with enough blood. This can make you weak or dizzy. Or it may make you pass out. Sometimes medicine can cause this problem. If this happens, your doctor may have you adjust one of your medicines. If a medicine is not the problem, your doctor may recommend a pacemaker. It is important to treat bradycardia so that you don't get more serious health problems. Your doctor will want to see you on a routine schedule to make sure that your heartbeat is normal. 
Follow-up care is a key part of your treatment and safety. Be sure to make and go to all appointments, and call your doctor if you are having problems. It's also a good idea to know your test results and keep a list of the medicines you take. How can you care for yourself at home? · Take your medicines exactly as prescribed. Call your doctor if you think you are having a problem with your medicine. If your bradycardia is caused by another disease, your doctor will try to treat the disease. If it is caused by heart medicines, he or she will adjust your medicines. · Make lifestyle changes to improve your heart health. ¨ Get regular exercise. Try for 30 minutes on most days of the week. If you do not have other heart problems, you likely do not have limits on the type or level of activity that you can do. You may want to walk, swim, bike, or do other activities. Ask your doctor what level of exercise is safe for you. ¨ To control your cholesterol, avoid foods with a lot of fat, saturated fat, or sodium. Try to eat more fiber. And if your doctor says it's okay, get some exercise on most days. ¨ Do not smoke. Smoking can make your heart condition worse. If you need help quitting, talk to your doctor about stop-smoking programs and medicines. These can increase your chances of quitting for good. ¨ Limit alcohol to 2 drinks a day for men and 1 drink a day for women. Too much alcohol can cause health problems. Pacemaker If you have a pacemaker, you will get more specific information about it. Be sure to: · Check your pulse as your doctor tells you. · Have your pacemaker checked as often as your doctor recommends. You may be able to do this over the phone or computer. · Avoid strong magnetic or electrical fields. These include wand metal detectors used in airports, MRIs, welding equipment, and generators. · You will be checked several times right after you get your pacemaker and when it is time to have the battery changed. Batteries last for 5 to 15 years. · You can talk on a cell phone. But keep it 6 inches away from your pacemaker. · Microwaves, TVs, radios, and kitchen and bathroom appliances won't harm you. When should you call for help? Call 911 anytime you think you may need emergency care. For example, call if: 
? · You have symptoms of sudden heart failure. These may include: ¨ Severe trouble breathing. ¨ A fast or irregular heartbeat. ¨ Coughing up pink, foamy mucus. ¨ You passed out. ? · You have symptoms of a stroke. These may include: 
¨ Sudden numbness, tingling, weakness, or loss of movement in your face, arm, or leg, especially on only one side of your body. ¨ Sudden vision changes. ¨ Sudden trouble speaking. ¨ Sudden confusion or trouble understanding simple statements. ¨ Sudden problems with walking or balance. ¨ A sudden, severe headache that is different from past headaches. ?Call your doctor now or seek immediate medical care if: 
? · You have new or changed symptoms of heart failure, such as: ¨ New or increased shortness of breath. ¨ New or worse swelling in your legs, ankles, or feet. ¨ Sudden weight gain, such as more than 2 to 3 pounds in a day or 5 pounds in a week. (Your doctor may suggest a different range of weight gain.) ¨ Feeling dizzy or lightheaded or like you may faint. ¨ Feeling so tired or weak that you cannot do your usual activities. ¨ Not sleeping well. Shortness of breath wakes you at night. You need extra pillows to prop yourself up to breathe easier. ? Watch closely for changes in your health, and be sure to contact your doctor if: 
? · You do not get better as expected. Where can you learn more? Go to http://michell-everardo.info/. Enter V937 in the search box to learn more about \"Bradycardia: Care Instructions. \" Current as of: September 21, 2016 Content Version: 11.4 © 8614-3325 Healthwise, [facility]. Care instructions adapted under license by [employer] (which disclaims liability or warranty for this information). If you have questions about a medical condition or this instruction, always ask your healthcare professional. [address] any warranty or liability for your use of this information. Introducing \[facility]\"" & HEALTH SERVICES! Dear Jenniffer Locke: 
Thank you for requesting a [employer] account. Our records indicate that you already have an active [employer] account. You can access your account anytime at [email or web address] Vision Did you know that you can access your hospital and ER discharge instructions at any time in [employer]? You can also review all of your test results from your hospital stay or ER visit. Additional Information If you have questions, please visit the Frequently Asked Questions section of the [employer] website at [email or web address] Remember, [employer] is NOT to be used for urgent needs. For medical emergencies, dial 911. Now available from your iPhone and Android! Please provide this summary of care documentation to your next provider. If you have any questions after today's visit, please call [phone].

## 2017-11-21 NOTE — PROGRESS NOTES
Hospital Follow Up (pt. daughter states that she went to the hospital for bradycardia and hypotension. Pt . was not resopndind to stimuli )        HPI: Abelardo Wick is a 80 y.o. female [address] here accompanied by her daughter for hospital follow up at [facility] from 10/30-11/1/17 for AMS. She has hx of dementia (?senile). She was diagnosed with UTI and sinus bradycadia. She had her verapamil discontinued and discharged home on an antibiotic. She has completed this course of abx. She has no urinary sx. She is eating well. She has apparently returned to her baseline mentation. Past Medical History:   Diagnosis Date    Arthritis     Breast cancer ([facility] [address] 2007    right breast (radiation)    Diabetes ([facility] [address]     diet controlled    GERD (gastroesophageal reflux disease)     Hypertension     Memory changes        Current Outpatient Prescriptions   Medication Sig    OTHER Check CBC, CMP, Mg in 4 days, results to PCP immediately, Diagnosis- UTI    OTHER Incentive spirometry- use as directed    latanoprost (XALATAN) 0.005 % ophthalmic solution Administer 1 Drop to both eyes nightly.  brimonidine (ALPHAGAN P) 0.1 % ophthalmic solution two (2) times a day.  memantine (NAMENDA) 10 mg tablet 1 q am    lisinopril (PRINIVIL, ZESTRIL) 10 mg tablet take 1 tablet by mouth once daily    raNITIdine (ZANTAC) 150 mg tablet take 1 tablet by mouth twice a day    atorvastatin (LIPITOR) 80 mg tablet take 1 tablet by mouth once daily at bedtime    calcium carbonate (CALCIUM CARBONATE) 600 mg calcium (1,500 mg) tablet Take 1 Tab by mouth daily. take 1 tablet by mouth twice a day    glucose blood VI test strips (ACCU-CHEK DINH PLUS TEST STRP) strip For daily glucose checks    glucose blood VI test strips (ACCU-CHEK DINH PLUS TEST STRP) strip For daily glucose checks    ferrous sulfate 325 mg (65 mg iron) tablet Take 1 Tab by mouth two (2) times a day.     aspirin delayed-release 81 mg tablet take 1 tablet by mouth once daily    multivitamin (DAILY MULTIPLE) tablet Take 1 Tab by mouth daily.  hydrocortisone (ALA-DEEDEE) 1 % lotion Apply  to affected area two (2) times a day. use thin layer    peg 400-propylene glycol (SYSTANE) 0.4-0.3 % drop Administer  to left eye as needed.  Lancets (ACCU-CHEK MULTICLIX LANCET) misc 100 accu-chek lancets    glucosamine-chondroit-vit c-mn 500-400 mg capsule Take 1 Cap by mouth two (2) times a day. No current facility-administered medications for this visit. Allergies   Allergen Reactions    Pcn [Penicillins] Other (comments)     Unknown-happened when she was very young       Past Surgical History:   Procedure Laterality Date    HX APPENDECTOMY      HX BREAST BIOPSY      HX CHOLECYSTECTOMY         Family History   Problem Relation Age of Onset    Cancer Mother     Cancer Father     Diabetes Sister     Diabetes Paternal Grandmother     Cancer Other        Social History     Social History    Marital status:      Spouse name: N/A    Number of children: N/A    Years of education: N/A     Occupational History    Not on file. Social History Main Topics    Smoking status: Never Smoker    Smokeless tobacco: Never Used    Alcohol use No    Drug use: No    Sexual activity: No     Other Topics Concern    Not on file     Social History Narrative       Review of Systems   Respiratory: Negative for shortness of breath. Cardiovascular: Negative for chest pain and palpitations. Neurological: Negative for dizziness and focal weakness. Visit Vitals    /65 (BP 1 Location: Right arm, BP Patient Position: Sitting)    Pulse (!) 54    Temp 97.2 °F (36.2 °C) (Oral)    Resp 16    Ht 5' 7\" (1.702 m)    Wt 130 lb (59 kg)    SpO2 99%    BMI 20.36 kg/m2       Physical Exam   Constitutional: She appears well-developed and well-nourished. No distress. Sitting comfortably in W/C     HENT:   Head: Normocephalic.    Right Ear: External ear normal.   Left Ear: External ear normal.   Cardiovascular: Regular rhythm and normal heart sounds. Bradycardia present. Pulmonary/Chest: Effort normal and breath sounds normal. She has no wheezes. She has no rhonchi. She has no rales. Musculoskeletal:        Right knee: She exhibits swelling. Left knee: She exhibits swelling. Skin: She is not diaphoretic. Nursing note and vitals reviewed. Assesment:  1. Essential hypertension  Lower end of normal. Decrease lisinopril to 5mg daily. 2. Sinus bradycardia  Patient currently asymptomatic of dizziness, increased confusion. Will have her see cardiologist due to continued bradycardia despite discontinuing verapamil 3 weeks ago  - REFERRAL TO CARDIOLOGY    3. Urinary tract infection without hematuria, site unspecified  No symptoms. I would like to recheck urine for resolution of infection. Daughter will collect at home and drop of to the office.    - AMB POC URINALYSIS DIP STICK AUTO W/O MICRO        I have discussed the diagnosis with the patient and the intended management  The patient has received an after-visit summary and questions were answered concerning future plans. I have discussed medication usage, side effects and warnings with the patient as well. I have reviewed the plan of care with the patient, accepted their input and they are in agreement with the treatment goals. Follow-up Disposition:  Return in about 2 months (around 1/21/2018) for hypertension.       Edwige Hua MD                .

## 2017-11-27 ENCOUNTER — PATIENT OUTREACH (OUTPATIENT)
Dept: FAMILY MEDICINE CLINIC | Age: 82
End: 2017-11-27

## 2017-11-27 DIAGNOSIS — K21.00 GASTROESOPHAGEAL REFLUX DISEASE WITH ESOPHAGITIS: ICD-10-CM

## 2017-11-27 DIAGNOSIS — Z76.0 MEDICATION REFILL: ICD-10-CM

## 2017-11-27 LAB
BILIRUB UR QL STRIP: NEGATIVE
GLUCOSE UR-MCNC: NEGATIVE MG/DL
KETONES P FAST UR STRIP-MCNC: NEGATIVE MG/DL
PH UR STRIP: 6 [PH] (ref 4.6–8)
PROT UR QL STRIP: NEGATIVE
SP GR UR STRIP: 1.02 (ref 1–1.03)
UA UROBILINOGEN AMB POC: NORMAL (ref 0.2–1)
URINALYSIS CLARITY POC: CLEAR
URINALYSIS COLOR POC: YELLOW
URINE BLOOD POC: NEGATIVE
URINE LEUKOCYTES POC: NEGATIVE
URINE NITRITES POC: NEGATIVE

## 2017-11-27 RX ORDER — RANITIDINE 150 MG/1
TABLET, FILM COATED ORAL
Qty: 180 TAB | Refills: 0 | Status: SHIPPED | OUTPATIENT
Start: 2017-11-27 | End: 2018-04-02 | Stop reason: SDUPTHER

## 2017-11-27 NOTE — TELEPHONE ENCOUNTER
Received a faxed refill request from [facility] for a 90 day refill on Ranitidine 150mg . 90 day supply per her insurance.     Patient's last OV: 11/01/17  Patient's next OV: 01/23/18  Medication(s) last filled on: 9/21/17

## 2017-11-27 NOTE — PROGRESS NOTES
Transitions of Care    Patient Admitted:  10/30/17 to [facility]  Patient Discharged 11-1-17   Discharge Disposition:  [place] [address] [address]    Per Attending Physician progress note of 10-31-17  By Dr. Heather Durant as follows:   1. Urinary tract infection. 2. Advanced dementia without evidence of acute encephalopathy  3. Bradycardia. 4. Hypotension. * See progress note of 10-31-17 by Dr. Montserrat Mckeon for additional information     Subjective:     Nurse Navigator spoke with patient's daughter, Ms. Farshad Blake. Ms. Luis Armando Barrera relates that patient is doing well. Ms. Luis Armando Barrera denies any new concerns. Ms. Luis Armando Barrera related that the nurses aide is transporting an urine sample to the office at the current time. Medication:   Patient's daughter denies any recent medication changes. Patient's daughter, Ms. Luis Armando Barrera asked about use of [employer] as a sleep aide. Ms. Luis Armando Barrera stated that another physician had recommended this previously. Ms. Luis Armando Barrera was asked to please call the PCP office for follow up with this question. Goals      Prevent complications post hospitalization.            - Attend physician Appointments   - Dr. Herson Shields, PCP  11/9/17 @ 3:30 pm . Appt. Date and time reviewed with patient's daughter  11-8-17:  Patient's daughter is aware of appointment scheduled for 11/9/17 and has arranged transportation. Plan: Follow up with medical appointment and outcome. 11-8-17:  Patient's daughter, Ms. Luis Armando Barrera reports that she is encouraging patient to drink water and keep hydrated. Nurse Navigator to follow up with patient's daughter within the next 7-10 days. 11-9-17. PCP appointment was not kept due to transportation not arriving as scheuduled per patient's daughter. 11-15-17: PCP appointment scheduled for 11-21-17.     11-15-17:  Eye appointment for follow up with retina specialist scheduled for this date per patient's daughter.      11-15-17: Regular eye doctor follow up appointment scheduled for 11-27-17.     11-21-17:  Nurse Navigator spoke with patient's daughter Ms. Gladis Melo. Ms. Lisa Oconnor states that patient has a PCP appointment scheduled for today. 11-27-17:  Patient attended PCP appointment on 11-21-17                    Patient referred to cardiology. First appointment to be scheduled                    Plan: follow up within 7-10 days re: cardiology follow up appt.  Supportive resources in place to maintain patient in the community (ie., home health, equipment, DME, refer to, etc.)            [address] to provide home care services to include SN, PT, OT    - Start of care verified     11-8-17:  Patient's daughter Ms. Christelle Souza reports that nursing and PT services are in place.   - Nurse Navigator called [address] staff confirm that OT  eval. Has been completed. Plan: Continue to monitor home health services. Contact  agency, [facility] as  needed. Continue to monitor and assess for additional supportive servicers that might  be beneficial to patient/family caregiver. 11-21-17:  Home care services continue. Patient's daughter states that PT visited with patient today. 11-27-17:  [address] continue         Understands red flags post discharge. 11-8-17:  Reviewed signs and symptoms of infection to include: fever/chills, decreased urination, breathing fast, fast pulse. Please follow up with PCP/ED if these symptoms are present. 11-15-17:  Reviewed signs and symptoms of infection with patient's daughter. Nurse Navigator to continue to follow case. Chart copied and staff message sent to Dr. John Zuleta.

## 2017-11-27 NOTE — Clinical Note
Dr. Kate Avila: I was looking at the office note from 11-21-17. Would you please confirm the dosage of the Lisinopril? From your note I was not sure if you wanted it changed or if it has been changed to a lower dose. Patient is to follow up with cardiology. Do you want cardiology to address this? Thank you so much. Zay Marr Nurse Navigator [phone].

## 2017-11-28 ENCOUNTER — PATIENT OUTREACH (OUTPATIENT)
Dept: FAMILY MEDICINE CLINIC | Age: 82
End: 2017-11-28

## 2017-11-28 DIAGNOSIS — Z76.0 MEDICATION REFILL: ICD-10-CM

## 2017-11-28 RX ORDER — LISINOPRIL 10 MG/1
5 TABLET ORAL
Qty: 30 TAB | Refills: 3 | Status: CANCELLED | OUTPATIENT
Start: 2017-11-28

## 2017-11-28 NOTE — TELEPHONE ENCOUNTER
Call made to the pt, spoke with her daughter Gaye Espinal, using two identifiers for the pt. Confirmed with daughter that the pt is taking Lisinopril 5mg and not the 10mg. She was made aware that her urine results were clear and showed no signs of infection. She verbalized understanding and stated that she is doing well and eating lunch. Forwarded tot he provider for review.

## 2017-11-28 NOTE — DISCHARGE SUMMARY
[address]  [address] SUMMARY    Name:  Triny Rowland  MR#:  [id number]  :  1933  Account #:  [de-identified]  Date of Adm:  10/30/2017  Date of Discharge:  2017      DISCHARGE DIAGNOSES INCLUDE:  1. Urinary tract infection. 2. Advanced dementia without any evidence of acute encephalopathy. 3. Bradycardia. 4. Hypotension. HOSPITAL COURSE: This is an 72-year-old female who was brought  to the ER with concern for worsening confusion and change in mental  status. The patient was noted to have a UTI. The patient was started  on antibiotics. The patient's mentation remained at her baseline. The  patient has advanced dementia. The patient was noted to have sinus  bradycardia which is chronic. Cardiology saw the patient. The patient's  verapamil was stopped. The patient remained stable and was cleared  for discharge. PT and OT were consulted. The patient was  hemodynamically stable at the time of discharge. Home health care  was requested. The patient was advised to follow up with PCP in 1  week, and Cardiology in 3 weeks. DISCHARGE MEDICATIONS INCLUDED:  1. Levofloxacin 500 mg p.o. every 24 hours for 3 more days. 2. Check a CBC, CMP, magnesium in 4 days, results to PCP  immediately. 3. Incentive spirometry, use as directed. 4. Xalatan eyedrops, continue same as prior to admission. 5. Alphagan eyedrops, continue same as prior to admission. 6. Namenda 10 mg p.o. daily. 7. Lisinopril 10 mg p.o. daily. 8. Zantac 150 mg p.o. twice daily. 9. Lipitor 80 mg p.o. daily. 10. Ferrous sulfate 325 mg p.o. twice daily. 11. Aspirin 81 mg p.o. daily. 12. Multivitamin 1 tablet p.o. daily. 13. Systane drops as needed. DISPOSITION: The patient was discharged to home. TIME SPENT: Total time for the discharge was more than 35 minutes. MD STEVE Ocasio / GIUSEPPE  D:  2017   16:59  T:  2017   12:44  Job #:  [id number]    PCP.

## 2017-11-28 NOTE — PROGRESS NOTES
Transition of Care    Nurse Navigator spoke with Mini Strong with [address] on 11-27-17 re: question of Dosage for Lisinopril. Dioni Santoro advised that [facility] Sea [facility]28-[facility]. Nurse Navigator attempted to send a staff message to Dr. Sea Panda but message was not delivered. No routing history per EMR. Nurse Navigator followed up with Dr. Sea Panda and discussed office visit of 11-21-17. Nurse Navigator asked Dr. Sea Panda if there were any medicatoin changes if he would please have the office nursing staff follow up with patient's daughter. Dr. Sea Panda was agreeable with this.

## 2017-11-29 NOTE — TELEPHONE ENCOUNTER
Did the daughter want a new Rx or is this kind of an update? Would they prefer the 5mg pill? Willl be less expensive to cut 10mg in half however it is a small pill and this can be difficult.      Thanks,  Tamie Bedoya MD

## 2017-12-06 ENCOUNTER — PATIENT OUTREACH (OUTPATIENT)
Dept: FAMILY MEDICINE CLINIC | Age: 82
End: 2017-12-06

## 2017-12-06 NOTE — PROGRESS NOTES
Transition of Care    Nurse Navigator spoke with patient's daughter, Ms Gladis Melo. via telephone call. [facility] related that patient is doing well and there re no new concerns. Patient's living situation remains unchanged. Patient's daughter advised this would be the last phone call as patient is doing well and has not been readmitted to the hospital within the last 30 days. Patient's daughter asked if there were any additional needs prior to closure of case. Patient's daughter stated she has not been contacted for cardiology follow up. Nurse Navigator reviewed chart and called Dr. Melania Schultz office for follow up. Nurse Navigator spoke with Frnaces Mckeon with [facility] and she advises that referral was forwarded to Dr. Craig Brandt office on 11-22-17. Nurse Navigator spoke with Bela Bravo asked if patient's daughter will call to schedule an appointment. Nurse Navigator called patient's daughter and provided the contact information for Dr. Craig Brandt office. Ms. Christelle Souza was asked to please follow up with Bela Bravo @ [phone] and ask to be scheduled at the [facility] site if that is closer for her. Nurse navigator advised patient's daughter that nurse navigator services will continue and nurse navigator will follow up with patient's cardiology appointment.

## 2017-12-22 DIAGNOSIS — I10 ESSENTIAL HYPERTENSION: ICD-10-CM

## 2017-12-22 DIAGNOSIS — Z76.0 MEDICATION REFILL: ICD-10-CM

## 2017-12-22 NOTE — TELEPHONE ENCOUNTER
This pharmacy faxed over request for the following prescriptions to be filled:    Medication requested :   Requested Prescriptions     Pending Prescriptions Disp Refills    aspirin delayed-release 81 mg tablet 30 Tab 6     Sig: take 1 tablet by mouth once daily     PCP: [facility] or Print: [facility] order or Local pharmacy [address]    Scheduled appointment if not seen by current providers in office: LOV 11/21/2017 f/u 1/23/2018

## 2017-12-26 RX ORDER — ASPIRIN 81 MG/1
TABLET ORAL
Qty: 30 TAB | Refills: 6 | Status: SHIPPED | OUTPATIENT
Start: 2017-12-26 | End: 2020-01-01

## 2018-01-03 ENCOUNTER — PATIENT OUTREACH (OUTPATIENT)
Dept: FAMILY MEDICINE CLINIC | Age: 83
End: 2018-01-03

## 2018-01-03 NOTE — PROGRESS NOTES
Transition of Care    Nurse Navigator (NN) attempted to contact patient's daughter via telephone call. There was no response. There was no option to leave a voicemail message. EMR reviewed.  Patient has a follow up appointment scheduled with Dr. Jeremy Padgett (Cardiology) for 1-8-17 @  12:30 pm.

## 2018-01-23 ENCOUNTER — OFFICE VISIT (OUTPATIENT)
Dept: FAMILY MEDICINE CLINIC | Age: 83
End: 2018-01-23

## 2018-01-23 VITALS
HEART RATE: 69 BPM | HEIGHT: 67 IN | OXYGEN SATURATION: 96 % | RESPIRATION RATE: 18 BRPM | SYSTOLIC BLOOD PRESSURE: 138 MMHG | DIASTOLIC BLOOD PRESSURE: 70 MMHG | TEMPERATURE: 97 F

## 2018-01-23 DIAGNOSIS — F02.818 DEMENTIA ASSOCIATED WITH OTHER UNDERLYING DISEASE WITH BEHAVIORAL DISTURBANCE: Chronic | ICD-10-CM

## 2018-01-23 DIAGNOSIS — E11.9 TYPE 2 DIABETES MELLITUS WITHOUT COMPLICATION, WITHOUT LONG-TERM CURRENT USE OF INSULIN (HCC): ICD-10-CM

## 2018-01-23 DIAGNOSIS — I10 ESSENTIAL HYPERTENSION: Primary | ICD-10-CM

## 2018-01-23 RX ORDER — DORZOLAMIDE HCL 20 MG/ML
2 SOLUTION/ DROPS OPHTHALMIC 3 TIMES DAILY
COMMUNITY

## 2018-01-23 NOTE — PROGRESS NOTES
Annual Wellness Visit; Medication Refill; and Fatigue        HPI: Yesi Pickett is a 80 y.o. female [address]  Here in follow up of her blood pressure. Accompanied by her daughter. She is in  Wheel chair. At last visit we had decreased her dose of lisinopril to 5mg daily. She denies any adverse effect with this change. She does wake up at night frequently. She also has occasional periods marked by aggressive behavior. Past Medical History:   Diagnosis Date    Arthritis     Breast cancer ([facility] [address] 2007    right breast (radiation)    Diabetes ([facility] [address]     diet controlled    GERD (gastroesophageal reflux disease)     Hypertension     Memory changes        Current Outpatient Prescriptions   Medication Sig    dorzolamide (TRUSOPT) 2 % ophthalmic solution Administer 2 Drops to both eyes three (3) times daily.  aspirin delayed-release 81 mg tablet take 1 tablet by mouth once daily    ferrous sulfate 325 mg (65 mg iron) tablet Take 1 Tab by mouth two (2) times a day.  raNITIdine (ZANTAC) 150 mg tablet take 1 tablet by mouth twice a day    OTHER Check CBC, CMP, Mg in 4 days, results to PCP immediately, Diagnosis- UTI    latanoprost (XALATAN) 0.005 % ophthalmic solution Administer 1 Drop to both eyes nightly.  memantine (NAMENDA) 10 mg tablet 1 q am    lisinopril (PRINIVIL, ZESTRIL) 10 mg tablet take 1 tablet by mouth once daily    atorvastatin (LIPITOR) 80 mg tablet take 1 tablet by mouth once daily at bedtime    calcium carbonate (CALCIUM CARBONATE) 600 mg calcium (1,500 mg) tablet Take 1 Tab by mouth daily. take 1 tablet by mouth twice a day    glucose blood VI test strips (ACCU-CHEK DINH PLUS TEST STRP) strip For daily glucose checks    glucose blood VI test strips (ACCU-CHEK DINH PLUS TEST STRP) strip For daily glucose checks    Lancets (ACCU-CHEK MULTICLIX LANCET) misc 100 accu-chek lancets    multivitamin (DAILY MULTIPLE) tablet Take 1 Tab by mouth daily.     hydrocortisone (ALA-DEEDEE) 1 % lotion Apply  to affected area two (2) times a day. use thin layer    peg 400-propylene glycol (SYSTANE) 0.4-0.3 % drop Administer  to left eye as needed.  OTHER Incentive spirometry- use as directed    brimonidine (ALPHAGAN P) 0.1 % ophthalmic solution two (2) times a day.  glucosamine-chondroit-vit c-mn 500-400 mg capsule Take 1 Cap by mouth two (2) times a day. No current facility-administered medications for this visit. Allergies   Allergen Reactions    Pcn [Penicillins] Other (comments)     Unknown-happened when she was very young       Past Surgical History:   Procedure Laterality Date    HX APPENDECTOMY      HX BREAST BIOPSY      HX CHOLECYSTECTOMY         Family History   Problem Relation Age of Onset    Cancer Mother     Cancer Father     Diabetes Sister     Diabetes Paternal Grandmother     Cancer Other        Social History     Social History    Marital status:      Spouse name: N/A    Number of children: N/A    Years of education: N/A     Occupational History    Not on file.      Social History Main Topics    Smoking status: Never Smoker    Smokeless tobacco: Never Used    Alcohol use No    Drug use: No    Sexual activity: No     Other Topics Concern    Not on file     Social History Narrative       Gen- No weight loss, No Malaise, No fatigue  Eyes- No dipoplia, blurry vision  CVS- No Chest pain, no palpitations, no edema  Resp- No Cough, SOB, WELLS, Wheezing  Neuro- No headaches  Skin- No easy bruising, No rashes      Visit Vitals    /70 (BP 1 Location: Left arm, BP Patient Position: Sitting)    Pulse 69    Temp 97 °F (36.1 °C) (Oral)    Resp 18    Ht 5' 7\" (1.702 m)    SpO2 96%       GEN- NAD, AAOx3, sitting in wheelchair  CVS- RRR, +S1, +S2, No Murmurs, No JVD  PULM- CTABL, No W/R/R  EXT- No edema  NEURO-Normal Gait  PSYCH- Euthymic, normal afffect    Results for orders placed or performed in visit on 01/23/18   AMB POC HEMOGLOBIN A1C   Result Value Ref Range    Hemoglobin A1c (POC) 5.0 %           Assesment:    ICD-10-CM ICD-9-CM    1. Essential hypertension I10 401.9    2. Dementia associated with other underlying disease with behavioral disturbance F02.81 294.8      294.11    3. Type 2 diabetes mellitus without complication, without long-term current use of insulin (HCC) E11.9 250.00 AMB POC HEMOGLOBIN A1C     1. Well controlled. Continue lisinopril 5mg daily    2. Ok to use unisom to help with sleep. We did discuss use of remeron to help with sleep and mood disturbance. Daughter will consider this    3. Diabetes well controlled. Continue dietary control      I have discussed the diagnosis with the patient and the intended management  The patient has received an after-visit summary and questions were answered concerning future plans. I have discussed medication usage, side effects and warnings with the patient as well. I have reviewed the plan of care with the patient, accepted their input and they are in agreement with the treatment goals. Follow-up Disposition:  Return in about 4 months (around 5/23/2018) for hyperetension.       Nereida Cain MD                .

## 2018-01-23 NOTE — PATIENT INSTRUCTIONS
Continue current dose of lisinopril. Ok to use Unisom at night for sleep problems. If still having mood disturbances please call and let me know. The medication I would like to prescribe is called Remeron (mirtazapine)         Helping A Person With Dementia: Care Instructions  Your Care Instructions    Dementia is a loss of mental skills that affects daily life. It is different from mild memory loss that occurs with aging. Dementia can cause problems with memory, thinking clearly, and planning. It is different for everyone. But it usually gets worse slowly. Some people who have dementia can function well for a long time. But at some point it may become hard for the person to care for himself or herself. It can be upsetting to learn that a loved one has this condition. You may be afraid and worried about what will happen. You may wonder how you will care for the person. There is no cure for dementia. But medicine may be able to slow memory loss and improve thinking for a while. Other medicines may help with sleep, depression, and behavior changes. Dementia is different for everyone. In some cases, people can function well for a long time. You can help your loved one by making his or her home life easier and safer. You also need to take care of yourself. Caregiving can be stressful. But support is available to help you and give you a break when you need it. The [employer] offers good information and support. If you are caring for someone with dementia, you can help make life safer and more comfortable. You can also help your loved one make decisions about future care. You may also want to bring up legal and financial issues. These are hard but important conversations to have. Follow-up care is a key part of your loved one's treatment and safety. Be sure to make and go to all appointments, and call your doctor if your loved one is having problems.  It's also a good idea to know your loved one's test results and keep a list of the medicines he or she takes. How can you care for your loved one at home? Taking care of the person  · If the person takes medicine for dementia, help him or her take it exactly as prescribed. Call the doctor if you notice any problems with the medicine. · Make a list of the person's medicines. Review it with all of his or her doctors. · Help the person eat a balanced diet. Serve plenty of whole grains, fruits, and vegetables every day. If the person is not hungry at mealtimes, give snacks at midmorning and in the afternoon. Offer drinks such as Boost, Ensure, or Sustacal if the person is losing weight. · Encourage exercise. Walking and other activities may slow the decline of mental ability. Help the person stay active mentally with reading, crossword puzzles, or other hobbies. · Take steps to help if the person is sundowning. This is the restless behavior and trouble with sleeping that may occur in late afternoon and at night. Try not to let the person nap during the day. Offer a glass of warm milk or caffeine-free tea before bedtime. · Develop a routine. Your loved one will feel less frustrated or confused with a clear, simple plan of what to do every day. · Be patient. A task may take the person longer than it used to. · For as long as he or she is able, allow your loved one to make decisions about activities, food, clothing, and other choices. Let him or her be independent, even if tasks take more time or are not done perfectly. Tailor tasks to the person's abilities. For example, if cooking is no longer safe, ask for other help. Your loved one can help set the table, or make simple dishes such as a salad. When the person needs help, offer it gently. Staying safe  · Make your home (or your loved one's home) safe. Tack down rugs, and put no-slip tape in the tub. Install handrails, and put safety switches on stoves and appliances. Keep rooms free of clutter.  Make sure walkways around furniture are clear. Do not move furniture around, because the person may become confused. · Use locks on doors and cupboards. Lock up knives, scissors, medicines, cleaning supplies, and other dangerous things. · Do not let the person drive or cook if he or she can't do it safely. A person with dementia should not drive unless he or she is able to pass an on-road driving test. Your state 's license bureau can do a driving test if there is any question. · Get medical alert jewelry for the person so that you can be contacted if he or she wanders away. If possible, provide a safe place for wandering, such as an enclosed yard or garden. Taking care of yourself  · Ask your doctor about support groups and other resources in your area. · Take care of your health. Be sure to eat healthy foods and get enough rest and exercise. · Take time for yourself. Respite services provide someone to stay with the person for a short time while you get out of the house for a few hours. · Make time for an activity that you enjoy. Read, listen to music, paint, do crafts, or play an instrument, even if it's only for a few minutes a day. · Spend time with family, friends, and others in your support system. When should you call for help? Call 911 anytime you think the person may need emergency care. For example, call if:  ? · The person who has dementia wanders away and you can't find him or her. ? · The person who has dementia is seriously injured. ?Call the doctor now or seek immediate medical care if:  ? · The person suddenly sees things that are not there (hallucinates). ? · The person has a sudden change in his or her behavior. ? Watch closely for changes in the person's health, and be sure to contact the doctor if:  ? · The person has symptoms that could cause injury. ? · The person has problems with his or her medicine. ? · You need more information to care for a person with dementia.    ? · You need respite care so you can take a break. Where can you learn more? Go to http://michell-everardo.info/. Enter Z605 in the search box to learn more about \"Helping A Person With Dementia: Care Instructions. \"  Current as of: May 12, 2017  Content Version: 11.4  © 2714-0111 [employer], [employer]. Care instructions adapted under license by [employer] (which disclaims liability or warranty for this information). If you have questions about a medical condition or this instruction, always ask your healthcare professional. [address] any warranty or liability for your use of this information.

## 2018-01-23 NOTE — MR AVS SNAPSHOT
[address] [facility] [id number] [phone] Patient: Diego Dimas MRN: [id number] SCC:[birth year] Visit Information Date & Time Provider Department Dept. Phone Encounter #  
 2018 12:30 PM 546Lilliam Leone [phone] [id number] Follow-up Instructions Return in about 4 months (around 2018) for hyperetension. Your Appointments 2018 10:45 AM  
New Patient with Truman Adam MD  
[facility]) Appt Note: bradycardia per PCP; bradycardia per PCP  
 [address] [facility], [address] [facility] [address]  
[address], [address] [address]  
  
    
 2018 11:00 AM  
Follow Up with Preet Zimmerman MD  
[employer] [facility]) Appt Note: 6mon f/u; pt daughter aware to provide Delpha Rivera for this visit [address] [facility] [id number] [phone]  
  
   
 [facility] [address] Upcoming Health Maintenance Date Due  
 FOOT EXAM Q1 2015 EYE EXAM RETINAL OR DILATED Q1 2016 Pneumococcal 65+ High/Highest Risk (2 of 2 - PPSV23) 2016 MICROALBUMIN Q1 2017 GLAUCOMA SCREENING Q2Y 2017 LIPID PANEL Q1 10/25/2017 HEMOGLOBIN A1C Q6M 2018 MEDICARE YEARLY EXAM 2018 DTaP/Tdap/Td series (2 - Td) 2026 Allergies as of 2018  Review Complete On: 2018 By: Mariella Schmitt LPN Severity Noted Reaction Type Reactions Pcn [Penicillins]  2013    Other (comments) Unknown-happened when she was very young Current Immunizations  Reviewed on 10/19/2016 Name Date Influenza High Dose Vaccine PF 2017, 2016 Influenza Vaccine 2014, 2013 Not reviewed this visit You Were Diagnosed With   
  
 Codes Comments Essential hypertension    -  Primary ICD-10-CM: I10 
ICD-9-CM: 401.9 Dementia associated with other underlying disease with behavioral disturbance     ICD-10-CM: F02.81 ICD-9-CM: 294.8, 294.11 Vitals BP Pulse Temp Resp Height(growth percentile) SpO2  
 138/70 (BP 1 Location: Left arm, BP Patient Position: Sitting) 69 97 °F (36.1 °C) (Oral) 18 5' 7\" (1.702 m) 96% OB Status Smoking Status Postmenopausal Never Smoker Preferred Pharmacy Pharmacy Name Phone [address], [address] Your Updated Medication List  
  
   
This list is accurate as of: 1/23/18  1:09 PM.  Always use your most recent med list.  
  
  
  
  
 ALPHAGAN P 0.1 % ophthalmic solution Generic drug:  brimonidine  
two (2) times a day. aspirin delayed-release 81 mg tablet  
take 1 tablet by mouth once daily  
  
 atorvastatin 80 mg tablet Commonly known as:  LIPITOR  
take 1 tablet by mouth once daily at bedtime  
  
 calcium carbonate 600 mg calcium (1,500 mg) tablet Commonly known as:  CALCIUM CARBONATE Take 1 Tab by mouth daily. take 1 tablet by mouth twice a day  
  
 dorzolamide 2 % ophthalmic solution Commonly known as:  TRUSOPT Administer 2 Drops to both eyes three (3) times daily. ferrous sulfate 325 mg (65 mg iron) tablet Take 1 Tab by mouth two (2) times a day. glucosamine-chondroit-vit c-mn 500-400 mg capsule Take 1 Cap by mouth two (2) times a day. * glucose blood VI test strips strip Commonly known as:  ACCU-CHEK DINH PLUS TEST STRP For daily glucose checks * glucose blood VI test strips strip Commonly known as:  ACCU-CHEK DINH PLUS TEST STRP For daily glucose checks  
  
 hydrocortisone 1 % lotion Commonly known as:  ALA-DEEDEE Apply  to affected area two (2) times a day. use thin layer Lancets Misc Commonly known as:  [address] [place] [address],[address] accu-chek lancets  
  
 latanoprost 0.005 % ophthalmic solution Commonly known as:  True Ready Administer 1 Drop to both eyes nightly. lisinopril 10 mg tablet Commonly known as:  PRINIVIL ZESTRIL  
take 1 tablet by mouth once daily  
  
 memantine 10 mg tablet Commonly known as:  NAMENDA  
1 q am  
  
 multivitamin tablet Commonly known as:  DAILY MULTIPLE Take 1 Tab by mouth daily. * OTHER Check CBC, CMP, Mg in 4 days, results to PCP immediately, Diagnosis- UTI  
  
 * OTHER Incentive spirometry- use as directed  
  
 raNITIdine 150 mg tablet Commonly known as:  ZANTAC  
take 1 tablet by mouth twice a day SYSTANE (PROPYLENE GLYCOL) 0.4-0.3 % Drop Generic drug:  peg 400-propylene glycol Administer  to left eye as needed. * Notice: This list has 4 medication(s) that are the same as other medications prescribed for you. Read the directions carefully, and ask your doctor or other care provider to review them with you. Follow-up Instructions Return in about 4 months (around 5/23/2018) for hyperetension. Patient Instructions Continue current dose of lisinopril. Ok to use Unisom at night for sleep problems. If still having mood disturbances please call and let me know. The medication I would like to prescribe is called Remeron (mirtazapine) Helping A Person With Dementia: Care Instructions Your Care Instructions Dementia is a loss of mental skills that affects daily life. It is different from mild memory loss that occurs with aging. Dementia can cause problems with memory, thinking clearly, and planning. It is different for everyone. But it usually gets worse slowly. Some people who have dementia can function well for a long time. But at some point it may become hard for the person to care for himself or herself. It can be upsetting to learn that a loved one has this condition.  You may be afraid and worried about what will happen. You may wonder how you will care for the person. There is no cure for dementia. But medicine may be able to slow memory loss and improve thinking for a while. Other medicines may help with sleep, depression, and behavior changes. Dementia is different for everyone. In some cases, people can function well for a long time. You can help your loved one by making his or her home life easier and safer. You also need to take care of yourself. Caregiving can be stressful. But support is available to help you and give you a break when you need it. The Alzheimer's Association offers good information and support. If you are caring for someone with dementia, you can help make life safer and more comfortable. You can also help your loved one make decisions about future care. You may also want to bring up legal and financial issues. These are hard but important conversations to have. Follow-up care is a key part of your loved one's treatment and safety. Be sure to make and go to all appointments, and call your doctor if your loved one is having problems. It's also a good idea to know your loved one's test results and keep a list of the medicines he or she takes. How can you care for your loved one at home? Taking care of the person · If the person takes medicine for dementia, help him or her take it exactly as prescribed. Call the doctor if you notice any problems with the medicine. · Make a list of the person's medicines. Review it with all of his or her doctors. · Help the person eat a balanced diet. Serve plenty of whole grains, fruits, and vegetables every day. If the person is not hungry at mealtimes, give snacks at midmorning and in the afternoon. Offer drinks such as Boost, Ensure, or Sustacal if the person is losing weight. · Encourage exercise. Walking and other activities may slow the decline of mental ability.  Help the person stay active mentally with reading, crossword puzzles, or other hobbies. · Take steps to help if the person is sundowning. This is the restless behavior and trouble with sleeping that may occur in late afternoon and at night. Try not to let the person nap during the day. Offer a glass of warm milk or caffeine-free tea before bedtime. · Develop a routine. Your loved one will feel less frustrated or confused with a clear, simple plan of what to do every day. · Be patient. A task may take the person longer than it used to. · For as long as he or she is able, allow your loved one to make decisions about activities, food, clothing, and other choices. Let him or her be independent, even if tasks take more time or are not done perfectly. Tailor tasks to the person's abilities. For example, if cooking is no longer safe, ask for other help. Your loved one can help set the table, or make simple dishes such as a salad. When the person needs help, offer it gently. Staying safe · Make your home (or your loved one's home) safe. Tack down rugs, and put no-slip tape in the tub. Install handrails, and put safety switches on stoves and appliances. Keep rooms free of clutter. Make sure walkways around furniture are clear. Do not move furniture around, because the person may become confused. · Use locks on doors and cupboards. Lock up knives, scissors, medicines, cleaning supplies, and other dangerous things. · Do not let the person drive or cook if he or she can't do it safely. A person with dementia should not drive unless he or she is able to pass an on-road driving test. Your state 's license bureau can do a driving test if there is any question. · Get medical alert jewelry for the person so that you can be contacted if he or she wanders away. If possible, provide a safe place for wandering, such as an enclosed yard or garden. Taking care of yourself · Ask your doctor about support groups and other resources in your area. · Take care of your health. Be sure to eat healthy foods and get enough rest and exercise. · Take time for yourself. Respite services provide someone to stay with the person for a short time while you get out of the house for a few hours. · Make time for an activity that you enjoy. Read, listen to music, paint, do crafts, or play an instrument, even if it's only for a few minutes a day. · Spend time with family, friends, and others in your support system. When should you call for help? Call 911 anytime you think the person may need emergency care. For example, call if: 
? · The person who has dementia wanders away and you can't find him or her. ? · The person who has dementia is seriously injured. ?Call the doctor now or seek immediate medical care if: 
? · The person suddenly sees things that are not there (hallucinates). ? · The person has a sudden change in his or her behavior. ? Watch closely for changes in the person's health, and be sure to contact the doctor if: 
? · The person has symptoms that could cause injury. ? · The person has problems with his or her medicine. ? · You need more information to care for a person with dementia. ? · You need respite care so you can take a break. Where can you learn more? Go to http://michell-everardo.info/. Enter V339 in the search box to learn more about \"Helping A Person With Dementia: Care Instructions. \" Current as of: May 12, 2017 Content Version: 11.4 © 9960-0197 Healthwise, [employer]. Care instructions adapted under license by [employer] (which disclaims liability or warranty for this information). If you have questions about a medical condition or this instruction, always ask your healthcare professional. [place] [address] any warranty or liability for your use of this information. Introducing [facility] & HEALTH SERVICES! Dear Negra Vora: Thank you for requesting a [employer] account. Our records indicate that you already have an active Kadient account. You can access your account anytime at [email or web address] Did you know that you can access your hospital and ER discharge instructions at any time in Kadient? You can also review all of your test results from your hospital stay or ER visit. Additional Information If you have questions, please visit the Frequently Asked Questions section of the Kad[employer] website at [email or web address] Remember, Kadient is NOT to be used for urgent needs. For medical emergencies, dial 911. Now available from your iPhone and Android! Please provide this summary of care documentation to your next provider. If you have any questions after today's visit, please call [phone].

## 2018-01-24 LAB — HBA1C MFR BLD HPLC: 5 %

## 2018-02-05 ENCOUNTER — OFFICE VISIT (OUTPATIENT)
Dept: CARDIOLOGY CLINIC | Age: 83
End: 2018-02-05

## 2018-02-05 VITALS
HEIGHT: 67 IN | DIASTOLIC BLOOD PRESSURE: 80 MMHG | HEART RATE: 65 BPM | WEIGHT: 130 LBS | BODY MASS INDEX: 20.4 KG/M2 | SYSTOLIC BLOOD PRESSURE: 140 MMHG

## 2018-02-05 DIAGNOSIS — E78.5 HYPERLIPIDEMIA, UNSPECIFIED HYPERLIPIDEMIA TYPE: ICD-10-CM

## 2018-02-05 DIAGNOSIS — E11.9 TYPE 2 DIABETES MELLITUS WITHOUT COMPLICATION, WITHOUT LONG-TERM CURRENT USE OF INSULIN (HCC): ICD-10-CM

## 2018-02-05 DIAGNOSIS — I10 ESSENTIAL HYPERTENSION: ICD-10-CM

## 2018-02-05 DIAGNOSIS — R00.1 BRADYCARDIA: Primary | ICD-10-CM

## 2018-02-05 DIAGNOSIS — F02.818 DEMENTIA ASSOCIATED WITH OTHER UNDERLYING DISEASE WITH BEHAVIORAL DISTURBANCE: Chronic | ICD-10-CM

## 2018-02-05 RX ORDER — ACETAMINOPHEN 500 MG
500 TABLET ORAL
COMMUNITY
End: 2020-01-01

## 2018-02-05 NOTE — MR AVS SNAPSHOT
[address] [facility] 
 
 
 [address] [facility], [address] [facility] [address] 
[phone] Patient: Misha Hyde MRN: [id number] OKL:[birth year] Visit Information Date & Time Provider Department Dept. Phone Encounter #  
 2/5/2018 10:45 AM Colin Nunes MD [facility] [address] [place] [address] [id number] Follow-up Instructions Return in about 6 months (around 8/5/2018). Your Appointments 4/23/2018 11:00 AM  
Follow Up with Liz Bain MD  
[facility]) Appt Note: 6mon f/u; pt daughter aware to provide Haily Grumbling for this visit [address] [facility] [id number] [phone]  
  
   
 [facility] [address] Upcoming Health Maintenance Date Due  
 FOOT EXAM Q1 9/29/2015 EYE EXAM RETINAL OR DILATED Q1 5/11/2016 Pneumococcal 65+ High/Highest Risk (2 of 2 - PPSV23) 11/4/2016 MICROALBUMIN Q1 4/12/2017 GLAUCOMA SCREENING Q2Y 5/11/2017 LIPID PANEL Q1 10/25/2017 HEMOGLOBIN A1C Q6M 7/23/2018 MEDICARE YEARLY EXAM 9/12/2018 DTaP/Tdap/Td series (2 - Td) 9/9/2026 Allergies as of 2/5/2018  Review Complete On: 2/5/2018 By: Colin Nunes MD  
  
 Severity Noted Reaction Type Reactions Pcn [Penicillins]  03/05/2013    Other (comments) Unknown-happened when she was very young Current Immunizations  Reviewed on 10/19/2016 Name Date Influenza High Dose Vaccine PF 9/11/2017, 9/9/2016 Influenza Vaccine 12/29/2014, 12/26/2013 Not reviewed this visit You Were Diagnosed With   
  
 Codes Comments Bradycardia    -  Primary ICD-10-CM: R00.1 ICD-9-CM: 427.89 stable 
off verapamil Essential hypertension     ICD-10-CM: I10 
ICD-9-CM: 401.9 controlled Dementia associated with other underlying disease with behavioral disturbance     ICD-10-CM: F02.81 ICD-9-CM: 294.8, 294.11   
 Type 2 diabetes mellitus without complication, without long-term current use of insulin (HCC)     ICD-10-CM: E11.9 ICD-9-CM: 250.00 Hyperlipidemia, unspecified hyperlipidemia type     ICD-10-CM: E78.5 ICD-9-CM: 272.4 on lipitor 
takes at times 
last ldl 60's Vitals BP Pulse Height(growth percentile) Weight(growth percentile) BMI OB Status 140/80 65 5' 7\" (1.702 m) 130 lb (59 kg) 20.36 kg/m2 Postmenopausal  
 Smoking Status Never Smoker Vitals History BMI and BSA Data Body Mass Index Body Surface Area  
 20.36 kg/m 2 1.67 m 2 Preferred Pharmacy Pharmacy Name Phone [address] [facility], [address] Your Updated Medication List  
  
   
This list is accurate as of: 2/5/18 11:01 AM.  Always use your most recent med list.  
  
  
  
  
 aspirin delayed-release 81 mg tablet  
take 1 tablet by mouth once daily  
  
 atorvastatin 80 mg tablet Commonly known as:  LIPITOR  
take 1 tablet by mouth once daily at bedtime  
  
 calcium carbonate 600 mg calcium (1,500 mg) tablet Commonly known as:  CALCIUM CARBONATE Take 1 Tab by mouth daily. take 1 tablet by mouth twice a day  
  
 dorzolamide 2 % ophthalmic solution Commonly known as:  TRUSOPT Administer 2 Drops to both eyes three (3) times daily. ferrous sulfate 325 mg (65 mg iron) tablet Take 1 Tab by mouth two (2) times a day. * glucose blood VI test strips strip Commonly known as:  ACCU-CHEK DINH PLUS TEST STRP For daily glucose checks * glucose blood VI test strips strip Commonly known as:  ACCU-CHEK DINH PLUS TEST STRP For daily glucose checks  
  
 hydrocortisone 1 % lotion Commonly known as:  ALA-DEEDEE Apply  to affected area two (2) times a day. use thin layer Lancets Misc Commonly known as:  ACCU-CHEK MULTICLIX LANCET  
100 accu-chek lancets  
  
 latanoprost 0.005 % ophthalmic solution Commonly known as:  Charley Carlos Eduardo Administer 1 Drop to both eyes nightly. lisinopril 10 mg tablet Commonly known as:  PRINIVIL, ZESTRIL  
take 1 tablet by mouth once daily  
  
 memantine 10 mg tablet Commonly known as:  NAMENDA  
1 q am  
  
 multivitamin tablet Commonly known as:  DAILY MULTIPLE Take 1 Tab by mouth daily. raNITIdine 150 mg tablet Commonly known as:  ZANTAC  
take 1 tablet by mouth twice a day SYSTANE (PROPYLENE GLYCOL) 0.4-0.3 % Drop Generic drug:  peg 400-propylene glycol Administer  to left eye as needed. TYLENOL EXTRA STRENGTH 500 mg tablet Generic drug:  acetaminophen Take  by mouth every six (6) hours as needed for Pain. * Notice: This list has 2 medication(s) that are the same as other medications prescribed for you. Read the directions carefully, and ask your doctor or other care provider to review them with you. Follow-up Instructions Return in about 6 months (around 8/5/2018). Introducing [facility] & HE[facility] SERVICES! Dear Patsy Elias: 
Thank you for requesting a [employer] Solutions account. Our records indicate that you already have an active [employer] Solutions account. You can access your account anytime at [email or web address] Did you know that you can access your hospital and ER discharge instructions at any time in [employer] Solutions? You can also review all of your test results from your hospital stay or ER visit. Additional Information If you have questions, please visit the Frequently Asked Questions section of the [employer] Solutions website at [email or web address] Remember, [employer] Solutions is NOT to be used for urgent needs. For medical emergencies, dial 911. Now available from your iPhone and Android! Please provide this summary of care documentation to your next provider. Your primary care clinician is listed as Zev Mckeon. If you have any questions after today's visit, please call [phone].

## 2018-02-05 NOTE — LETTER
Angelique Garcia 2/3/1933 
 
2/5/2018 Dear Mark Tijerina MD 
 
I had the pleasure of evaluating  Ms. Sung Martins in office today. Below are the relevant portions of my assessment and plan of care. ICD-10-CM ICD-9-CM 1. Bradycardia R00.1 427.89   
 stable 
off verapamil 2. Essential hypertension I10 401.9   
 controlled 3. Dementia associated with other underlying disease with behavioral disturbance F02.81 294.8   
  294.11   
4. Type 2 diabetes mellitus without complication, without long-term current use of insulin (HCC) E11.9 250.00   
5. Hyperlipidemia, unspecified hyperlipidemia type E78.5 272.4   
 on lipitor 
takes at times 
last ldl 60's Current Outpatient Prescriptions Medication Sig Dispense Refill  acetaminophen (TYLENOL EXTRA STRENGTH) 500 mg tablet Take  by mouth every six (6) hours as needed for Pain.  dorzolamide (TRUSOPT) 2 % ophthalmic solution Administer 2 Drops to both eyes three (3) times daily.  aspirin delayed-release 81 mg tablet take 1 tablet by mouth once daily 30 Tab 6  ferrous sulfate 325 mg (65 mg iron) tablet Take 1 Tab by mouth two (2) times a day. 60 Tab 1  raNITIdine (ZANTAC) 150 mg tablet take 1 tablet by mouth twice a day 180 Tab 0  
 latanoprost (XALATAN) 0.005 % ophthalmic solution Administer 1 Drop to both eyes nightly.  memantine (NAMENDA) 10 mg tablet 1 q am 30 Tab 5  
 lisinopril (PRINIVIL, ZESTRIL) 10 mg tablet take 1 tablet by mouth once daily (Patient taking differently: take . 5 tablet by mouth once daily) 30 Tab 3  
 calcium carbonate (CALCIUM CARBONATE) 600 mg calcium (1,500 mg) tablet Take 1 Tab by mouth daily.  take 1 tablet by mouth twice a day 60 Tab 6  
 glucose blood VI test strips (ACCU-CHEK DINH PLUS TEST STRP) strip For daily glucose checks 100 Strip 11  
 glucose blood VI test strips (ACCU-CHEK DINH PLUS TEST STRP) strip For daily glucose checks 100 Strip 11  
  Lancets (ACCU-CHEK MULTICLIX LANCET) misc 100 accu-chek lancets 1 Package 11  
 multivitamin (DAILY MULTIPLE) tablet Take 1 Tab by mouth daily. 30 Tab 6  
 hydrocortisone (ALA-DEEDEE) 1 % lotion Apply  to affected area two (2) times a day. use thin layer  peg 400-propylene glycol (SYSTANE) 0.4-0.3 % drop Administer  to left eye as needed.  atorvastatin (LIPITOR) 80 mg tablet take 1 tablet by mouth once daily at bedtime 30 Tab 0 Orders Placed This Encounter  acetaminophen (TYLENOL EXTRA STRENGTH) 500 mg tablet Sig: Take  by mouth every six (6) hours as needed for Pain. If you have questions, please do not hesitate to call me. I look forward to following Ms. Penny Hooper along with you. Sincerely, Alton Mulligan MD

## 2018-02-05 NOTE — PROGRESS NOTES
HISTORY OF PRESENT ILLNESS  Misha Hyde is a 80 y.o. female. HPI Comments: Admitted 10/2017  Bradycardia-AMS-off verapamil since then  No significant symptoms    New Patient   The history is provided by the patient. Pertinent negatives include no chest pain, no abdominal pain, no headaches and no shortness of breath. Slow Heart Rate   The history is provided by the patient. This is a chronic problem. The problem occurs constantly. The problem has not changed since onset. Pertinent negatives include no chest pain, no abdominal pain, no headaches and no shortness of breath. Nothing aggravates the symptoms. Nothing relieves the symptoms. Review of Systems   Constitutional: Negative for chills and fever. HENT: Negative for nosebleeds. Eyes: Negative for blurred vision and double vision. Respiratory: Negative for cough, hemoptysis, sputum production, shortness of breath and wheezing. Cardiovascular: Negative for chest pain, palpitations, orthopnea, claudication, leg swelling and PND. Gastrointestinal: Negative for abdominal pain, heartburn, nausea and vomiting. Musculoskeletal: Negative for myalgias. Skin: Negative for rash. Neurological: Negative for dizziness, weakness and headaches. Endo/Heme/Allergies: Does not bruise/bleed easily.      Family History   Problem Relation Age of Onset    Cancer Mother     Cancer Father     Diabetes Sister     Diabetes Paternal Grandmother     Cancer Other        Past Medical History:   Diagnosis Date    Arthritis     Breast cancer ([address] 2007    right breast (radiation)    Diabetes ([facility]     diet controlled    GERD (gastroesophageal reflux disease)     Hypertension     Memory changes        Past Surgical History:   Procedure Laterality Date    HX APPENDECTOMY      HX BREAST BIOPSY      HX CHOLECYSTECTOMY         Social History   Substance Use Topics    Smoking status: Never Smoker    Smokeless tobacco: Never Used    Alcohol use No Allergies   Allergen Reactions    Pcn [Penicillins] Other (comments)     Unknown-happened when she was very young       Prior to Admission medications    Medication Sig Start Date End Date Taking? Authorizing Provider   acetaminophen (TYLENOL EXTRA STRENGTH) 500 mg tablet Take  by mouth every six (6) hours as needed for Pain. Yes Historical Provider   dorzolamide (TRUSOPT) 2 % ophthalmic solution Administer 2 Drops to both eyes three (3) times daily. Yes Historical Provider   aspirin delayed-release 81 mg tablet take 1 tablet by mouth once daily 12/26/17  Yes Marianela Riddle MD   ferrous sulfate 325 mg (65 mg iron) tablet Take 1 Tab by mouth two (2) times a day. 12/20/17  Yes Gabby Jung PA-C   raNITIdine (ZANTAC) 150 mg tablet take 1 tablet by mouth twice a day 11/27/17  Yes DIAN Ashraf   latanoprost (XALATAN) 0.005 % ophthalmic solution Administer 1 Drop to both eyes nightly. Yes Historical Provider   memantine (NAMENDA) 10 mg tablet 1 q am 10/23/17  Yes Uriel Horn MD   lisinopril (PRINIVIL, ZESTRIL) 10 mg tablet take 1 tablet by mouth once daily  Patient taking differently: take . 5 tablet by mouth once daily 10/20/17  Yes Wayne Morillo MD   calcium carbonate (CALCIUM CARBONATE) 600 mg calcium (1,500 mg) tablet Take 1 Tab by mouth daily. take 1 tablet by mouth twice a day 7/24/17  Yes DIAN Cline   glucose blood VI test strips (ACCU-CHEK DINH PLUS TEST STRP) strip For daily glucose checks 5/25/17  Yes Wayne Morillo MD   glucose blood VI test strips (ACCU-CHEK DINH PLUS TEST STRP) strip For daily glucose checks 5/25/17  Yes Wayne Morillo MD   Lancets (ACCU-CHEK MULTICLIX LANCET) misc 100 accu-chek lancets 5/25/17  Yes Wayne Morillo MD   multivitamin (DAILY MULTIPLE) tablet Take 1 Tab by mouth daily. 10/13/15  Yes Ramses Evans NP   hydrocortisone (ALA-DEEDEE) 1 % lotion Apply  to affected area two (2) times a day.  use thin layer   Yes Historical Provider   peg 400-propylene glycol (SYSTANE) 0.4-0.3 % drop Administer  to left eye as needed. Yes Historical Provider   atorvastatin (LIPITOR) 80 mg tablet take 1 tablet by mouth once daily at bedtime 9/1/17   Moises De Los Santos NP         Visit Vitals    /80    Pulse 65    Ht 5' 7\" (1.702 m)    Wt 59 kg (130 lb)    BMI 20.36 kg/m2         Physical Exam   Constitutional: She is oriented to person, place, and time. She appears well-developed and well-nourished. HENT:   Head: Normocephalic and atraumatic. Eyes: Conjunctivae are normal.   Neck: Neck supple. No JVD present. No tracheal deviation present. No thyromegaly present. Cardiovascular: Normal rate and regular rhythm. PMI is not displaced. Exam reveals no gallop, no S3 and no decreased pulses. No murmur heard. Pulmonary/Chest: No respiratory distress. She has no wheezes. She has no rales. She exhibits no tenderness. Abdominal: Soft. There is no tenderness. Musculoskeletal: She exhibits no edema. Neurological: She is alert and oriented to person, place, and time. Skin: Skin is warm. Psychiatric: She has a normal mood and affect. Ms. Dayami Stewart has a reminder for a \"due or due soon\" health maintenance. I have asked that she contact her primary care provider for follow-up on this health maintenance. SUMMARY:10/2017  Left ventricle: Ejection fraction was estimated in the range of 55 % to 60 %. No obvious wall motion abnormalities  identified in the views obtained. Wall thickness was mildly increased. Pulmonic valve: There was mild regurgitation. 10/2017  Abnormal ECG. 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 Troponin unremarkable. Normal LV function with EF 50-55% by echo 10/2016.   -Advanced age, dementia, full code. Chronic sinus bradycardia, previously intolerant to BB, on verapamil as outpatient.  No significant pauses overnight      Assessment         ICD-10-CM ICD-9-CM    1. Bradycardia R00.1 427.89     stable  off verapamil     2. Essential hypertension I10 401.9     controlled   3. Dementia associated with other underlying disease with behavioral disturbance F02.81 294.8      294.11    4. Type 2 diabetes mellitus without complication, without long-term current use of insulin (HCC) E11.9 250.00    5. Hyperlipidemia, unspecified hyperlipidemia type E78.5 272.4     on lipitor  takes at times  last ldl 60's   family has decided against pacemaker in past      Medications Discontinued During This Encounter   Medication Reason    OTHER Error    OTHER Error    glucosamine-chondroit-vit c-mn 500-400 mg capsule Not A Current Medication    brimonidine (ALPHAGAN P) 0.1 % ophthalmic solution Not A Current Medication       No orders of the defined types were placed in this encounter. Follow-up Disposition:  Return in about 6 months (around 8/5/2018).

## 2018-02-14 DIAGNOSIS — Z76.0 MEDICATION REFILL: ICD-10-CM

## 2018-02-15 RX ORDER — ACETAMINOPHEN 500 MG
TABLET ORAL
Qty: 60 TAB | Refills: 6 | Status: SHIPPED | OUTPATIENT
Start: 2018-02-15 | End: 2020-01-01

## 2018-02-18 DIAGNOSIS — Z76.0 MEDICATION REFILL: ICD-10-CM

## 2018-02-19 RX ORDER — ATORVASTATIN CALCIUM 80 MG/1
TABLET, FILM COATED ORAL
Qty: 90 TAB | Refills: 2 | Status: SHIPPED | OUTPATIENT
Start: 2018-02-19 | End: 2018-04-04 | Stop reason: SDUPTHER

## 2018-02-22 RX ORDER — ACETAMINOPHEN 500 MG
TABLET ORAL
Qty: 60 TAB | Refills: 6 | Status: SHIPPED | OUTPATIENT
Start: 2018-02-22 | End: 2018-07-09 | Stop reason: SDUPTHER

## 2018-02-23 DIAGNOSIS — Z76.0 MEDICATION REFILL: ICD-10-CM

## 2018-02-23 DIAGNOSIS — N91.2 AMENIA: ICD-10-CM

## 2018-02-28 RX ORDER — FERROUS SULFATE 324(65)MG
TABLET, DELAYED RELEASE (ENTERIC COATED) ORAL
Qty: 60 TAB | Refills: 1 | Status: SHIPPED | OUTPATIENT
Start: 2018-02-28 | End: 2019-01-01 | Stop reason: SDUPTHER

## 2018-03-16 DIAGNOSIS — Z76.0 MEDICATION REFILL: ICD-10-CM

## 2018-03-17 RX ORDER — LISINOPRIL 10 MG/1
5 TABLET ORAL DAILY
Qty: 30 TAB | Refills: 3 | Status: SHIPPED | OUTPATIENT
Start: 2018-03-17 | End: 2018-04-04 | Stop reason: SDUPTHER

## 2018-04-02 DIAGNOSIS — Z76.0 MEDICATION REFILL: ICD-10-CM

## 2018-04-02 DIAGNOSIS — K21.00 GASTROESOPHAGEAL REFLUX DISEASE WITH ESOPHAGITIS: ICD-10-CM

## 2018-04-02 RX ORDER — MEMANTINE HYDROCHLORIDE 10 MG/1
TABLET ORAL
Qty: 30 TAB | Refills: 5 | Status: SHIPPED | OUTPATIENT
Start: 2018-04-02 | End: 2018-04-09 | Stop reason: SDUPTHER

## 2018-04-02 NOTE — TELEPHONE ENCOUNTER
Received a fax from [facility] requesting a refill

## 2018-04-03 RX ORDER — RANITIDINE 150 MG/1
TABLET, FILM COATED ORAL
Qty: 180 TAB | Refills: 0 | Status: SHIPPED | OUTPATIENT
Start: 2018-04-03 | End: 2018-06-05 | Stop reason: SDUPTHER

## 2018-04-04 DIAGNOSIS — Z76.0 MEDICATION REFILL: ICD-10-CM

## 2018-04-05 RX ORDER — ATORVASTATIN CALCIUM 80 MG/1
TABLET, FILM COATED ORAL
Qty: 90 TAB | Refills: 2 | Status: SHIPPED | OUTPATIENT
Start: 2018-04-05 | End: 2018-10-15 | Stop reason: SDUPTHER

## 2018-04-05 RX ORDER — LISINOPRIL 10 MG/1
5 TABLET ORAL DAILY
Qty: 30 TAB | Refills: 3 | Status: SHIPPED | OUTPATIENT
Start: 2018-04-05 | End: 2018-07-09 | Stop reason: SDUPTHER

## 2018-04-05 RX ORDER — HYDROCORTISONE 10 MG/ML
LOTION TOPICAL 2 TIMES DAILY
Qty: 60 G | Refills: 2 | Status: SHIPPED | OUTPATIENT
Start: 2018-04-05 | End: 2020-01-01

## 2018-04-09 RX ORDER — MEMANTINE HYDROCHLORIDE 10 MG/1
TABLET ORAL
Qty: 90 TAB | Refills: 1 | Status: SHIPPED | OUTPATIENT
Start: 2018-04-09 | End: 2018-05-02 | Stop reason: SDUPTHER

## 2018-04-09 NOTE — TELEPHONE ENCOUNTER
Namenda sent to local pharmacy on 4/2/2018. Please submit to [facility] for mailorder.

## 2018-05-02 ENCOUNTER — OFFICE VISIT (OUTPATIENT)
Dept: NEUROLOGY | Age: 83
End: 2018-05-02

## 2018-05-02 VITALS
DIASTOLIC BLOOD PRESSURE: 58 MMHG | RESPIRATION RATE: 16 BRPM | HEIGHT: 67 IN | TEMPERATURE: 98.2 F | HEART RATE: 64 BPM | SYSTOLIC BLOOD PRESSURE: 140 MMHG

## 2018-05-02 DIAGNOSIS — F02.818 DEMENTIA ASSOCIATED WITH OTHER UNDERLYING DISEASE WITH BEHAVIORAL DISTURBANCE: Primary | ICD-10-CM

## 2018-05-02 RX ORDER — MEMANTINE HYDROCHLORIDE 10 MG/1
TABLET ORAL
Qty: 90 TAB | Refills: 4 | Status: SHIPPED | OUTPATIENT
Start: 2018-05-02 | End: 2019-01-01 | Stop reason: SDUPTHER

## 2018-05-02 NOTE — PROGRESS NOTES
1. Have you been to the ER, urgent care clinic since your last visit? Hospitalized since your last visit? No    2. Have you seen or consulted any other health care providers outside of the [address] [place] [address] since your last visit? Include any pap smears or colon screening.  No     Chief Complaint   Patient presents with    Follow-up    Dementia

## 2018-05-02 NOTE — MR AVS SNAPSHOT
[facility] 
 
 
 [address] [address] 
[phone] Patient: Jefferson Longoria MRN: [id number] MQF:[id number] Visit Information Date & Time Provider Department Dept. Phone Encounter #  
 5/2/2018 11:30 AM Reather Phalen, [address] [place] [address] [phone] [id number] Follow-up Instructions Return in about 6 months (around 11/2/2018). Your Appointments 8/20/2018 10:00 AM  
ESTABLISHED PATIENT with Deonte Levin MD  
[facility]) Appt Note: 6 months [address] [facility], [address] [facility] [address] [address] [facility], [address] Upcoming Health Maintenance Date Due  
 FOOT EXAM Q1 9/29/2015 EYE EXAM RETINAL OR DILATED Q1 5/11/2016 Pneumococcal 65+ High/Highest Risk (2 of 2 - PPSV23) 11/4/2016 MICROALBUMIN Q1 4/12/2017 GLAUCOMA SCREENING Q2Y 5/11/2017 LIPID PANEL Q1 10/25/2017 HEMOGLOBIN A1C Q6M 7/23/2018 Influenza Age 5 to Adult 8/1/2018 MEDICARE YEARLY EXAM 9/12/2018 DTaP/Tdap/Td series (2 - Td) 9/9/2026 Allergies as of 5/2/2018  Review Complete On: 5/2/2018 By: Reather Phalen, NP Severity Noted Reaction Type Reactions Pcn [Penicillins]  03/05/2013    Other (comments) Unknown-happened when she was very young Current Immunizations  Reviewed on 10/19/2016 Name Date Influenza High Dose Vaccine PF 9/11/2017, 9/9/2016 Influenza Vaccine 12/29/2014, 12/26/2013 Not reviewed this visit Vitals BP Pulse Temp Resp Height(growth percentile) OB Status 140/58 (BP 1 Location: Left arm, BP Patient Position: Sitting) 64 98.2 °F (36.8 °C) (Oral) 16 5' 7\" (1.702 m) Postmenopausal  
 Smoking Status Never Smoker Preferred Pharmacy Pharmacy Name Phone  [address], [address] [facility] [phone] Your Updated Medication List  
  
   
This list is accurate as of 5/2/18 11:45 AM.  Always use your most recent med list.  
  
  
  
  
 aspirin delayed-release 81 mg tablet  
take 1 tablet by mouth once daily  
  
 atorvastatin 80 mg tablet Commonly known as:  LIPITOR  
take 1 tablet by mouth once daily at bedtime * CALCIUM 600 600 mg calcium (1,500 mg) tablet Generic drug:  calcium carbonate  
take 1 tablet by mouth twice a day * CALCIUM 600 600 mg calcium (1,500 mg) tablet Generic drug:  calcium carbonate  
take 1 tablet by mouth twice a day  
  
 dorzolamide 2 % ophthalmic solution Commonly known as:  TRUSOPT Administer 2 Drops to both eyes three (3) times daily. ferrous sulfate 324 mg (65 mg iron) tablet  
take 1 tablet by mouth twice a day * glucose blood VI test strips strip Commonly known as:  ACCU-CHEK DINH PLUS TEST STRP For daily glucose checks * glucose blood VI test strips strip Commonly known as:  ACCU-CHEK DINH PLUS TEST STRP For daily glucose checks  
  
 hydrocortisone 1 % lotion Commonly known as:  ALA-DEEDEE Apply  to affected area two (2) times a day. use thin layer Lancets Misc Commonly known as:  ACCU-CHEK MULTICLIX LANCET  
100 accu-chek lancets  
  
 latanoprost 0.005 % ophthalmic solution Commonly known as:  Bretta Kaska Administer 1 Drop to both eyes nightly. lisinopril 10 mg tablet Commonly known as:  Yessenia Drought Take 0.5 Tabs by mouth daily. take . 5 tablet by mouth once daily  
  
 memantine 10 mg tablet Commonly known as:  NAMENDA  
1 q am  
  
 multivitamin tablet Commonly known as:  DAILY MULTIPLE Take 1 Tab by mouth daily. raNITIdine 150 mg tablet Commonly known as:  ZANTAC  
take 1 tablet by mouth twice a day SYSTANE (PROPYLENE GLYCOL) 0.4-0.3 % Drop Generic drug:  peg 400-propylene glycol Administer  to left eye as needed. TYLENOL EXTRA STRENGTH 500 mg tablet Generic drug:  acetaminophen Take  by mouth every six (6) hours as needed for Pain. * Notice: This list has 4 medication(s) that are the same as other medications prescribed for you. Read the directions carefully, and ask your doctor or other care provider to review them with you. Prescriptions Sent to Pharmacy Refills  
 memantine (NAMENDA) 10 mg tablet 4 Si q am  
 Class: Normal  
 Pharmacy: [address] [place] [address], [address] [place] [address] #: [phone] Follow-up Instructions Return in about 6 months (around 2018). Patient Instructions Refills sent to pharmacy for Namenda 10 mg daily. Introducing [facility]! Dear Gallo Mane: 
Thank you for requesting a [facility] account. Our records indicate that you already have an active [facility] account. You can access your account anytime at [email or web address] Did you know that you can access your hospital and ER discharge instructions at any time in [facility]? You can also review all of your test results from your hospital stay or ER visit. Additional Information If you have questions, please visit the Frequently Asked Questions section of the [facility] website at [email or web address] Remember, [facility] is NOT to be used for urgent needs. For medical emergencies, dial 911. Now available from your iPhone and Android! Please provide this summary of care documentation to your next provider. Your primary care clinician is listed as Ralph Tolentino. If you have any questions after today's visit, please call [phone].

## 2018-06-05 DIAGNOSIS — Z76.0 MEDICATION REFILL: ICD-10-CM

## 2018-06-05 DIAGNOSIS — K21.00 GASTROESOPHAGEAL REFLUX DISEASE WITH ESOPHAGITIS: ICD-10-CM

## 2018-06-06 ENCOUNTER — PATIENT OUTREACH (OUTPATIENT)
Dept: FAMILY MEDICINE CLINIC | Age: 83
End: 2018-06-06

## 2018-06-06 RX ORDER — RANITIDINE 150 MG/1
TABLET, FILM COATED ORAL
Qty: 180 TAB | Refills: 0 | Status: SHIPPED | OUTPATIENT
Start: 2018-06-06 | End: 2018-08-08 | Stop reason: SDUPTHER

## 2018-07-09 ENCOUNTER — OFFICE VISIT (OUTPATIENT)
Dept: FAMILY MEDICINE CLINIC | Age: 83
End: 2018-07-09

## 2018-07-09 VITALS
TEMPERATURE: 98.3 F | HEART RATE: 69 BPM | SYSTOLIC BLOOD PRESSURE: 142 MMHG | RESPIRATION RATE: 12 BRPM | DIASTOLIC BLOOD PRESSURE: 82 MMHG | HEIGHT: 67 IN | OXYGEN SATURATION: 97 %

## 2018-07-09 DIAGNOSIS — E11.9 TYPE 2 DIABETES MELLITUS WITHOUT COMPLICATION, WITHOUT LONG-TERM CURRENT USE OF INSULIN (HCC): ICD-10-CM

## 2018-07-09 DIAGNOSIS — L72.3 SEBACEOUS CYST: ICD-10-CM

## 2018-07-09 DIAGNOSIS — E11.9 TYPE 2 DIABETES MELLITUS WITHOUT COMPLICATION, WITHOUT LONG-TERM CURRENT USE OF INSULIN (HCC): Primary | ICD-10-CM

## 2018-07-09 DIAGNOSIS — K21.9 GASTROESOPHAGEAL REFLUX DISEASE, ESOPHAGITIS PRESENCE NOT SPECIFIED: ICD-10-CM

## 2018-07-09 DIAGNOSIS — F03.91 DEMENTIA WITH BEHAVIORAL DISTURBANCE, UNSPECIFIED DEMENTIA TYPE: ICD-10-CM

## 2018-07-09 DIAGNOSIS — I10 ESSENTIAL HYPERTENSION: ICD-10-CM

## 2018-07-09 PROBLEM — I95.9 HYPOTENSION: Status: RESOLVED | Noted: 2017-10-30 | Resolved: 2018-07-09

## 2018-07-09 PROBLEM — F02.80 DEMENTIA ASSOCIATED WITH OTHER UNDERLYING DISEASE: Status: ACTIVE | Noted: 2017-05-15

## 2018-07-09 PROBLEM — R00.1 BRADYCARDIA: Status: RESOLVED | Noted: 2017-10-30 | Resolved: 2018-07-09

## 2018-07-09 RX ORDER — LISINOPRIL 10 MG/1
5 TABLET ORAL DAILY
Qty: 30 TAB | Refills: 2 | Status: SHIPPED | OUTPATIENT
Start: 2018-07-09 | End: 2018-08-27 | Stop reason: SDUPTHER

## 2018-07-09 NOTE — PROGRESS NOTES
Patient: Fabricio Lang MRN: [id number]  SSN: [id number]    YOB: 1933  Age: 80 y.o. Sex: female      Date of Service: 7/9/2018   Provider: DIAN Dodd   Office Location:   [address] [place], [address]  Office Phone: [phone]  Office Fax: [phone]        REASON FOR VISIT:   Chief Complaint   Patient presents with    Diabetes    Hypertension    Dementia    GERD    Establish Care        VITALS:   Visit Vitals    /82    Pulse 69    Temp 98.3 °F (36.8 °C) (Oral)    Resp 12    Ht 5' 7\" (1.702 m)    SpO2 97%       MEDICATIONS:   Current Outpatient Prescriptions   Medication Sig Dispense Refill    lisinopril (PRINIVIL, ZESTRIL) 10 mg tablet Take 0.5 Tabs by mouth daily. 30 Tab 2    raNITIdine (ZANTAC) 150 mg tablet TAKE 1 TABLET TWICE DAILY 180 Tab 0    memantine (NAMENDA) 10 mg tablet 1 q am 90 Tab 4    atorvastatin (LIPITOR) 80 mg tablet take 1 tablet by mouth once daily at bedtime 90 Tab 2    hydrocortisone (ALA-DEEDEE) 1 % lotion Apply  to affected area two (2) times a day. use thin layer 60 g 2    ferrous sulfate 324 mg (65 mg iron) tablet take 1 tablet by mouth twice a day 60 Tab 1    CALCIUM 600 600 mg calcium (1,500 mg) tablet take 1 tablet by mouth twice a day 60 Tab 6    acetaminophen (TYLENOL EXTRA STRENGTH) 500 mg tablet Take  by mouth every six (6) hours as needed for Pain.  dorzolamide (TRUSOPT) 2 % ophthalmic solution Administer 2 Drops to both eyes three (3) times daily.  aspirin delayed-release 81 mg tablet take 1 tablet by mouth once daily 30 Tab 6    latanoprost (XALATAN) 0.005 % ophthalmic solution Administer 1 Drop to both eyes nightly.  multivitamin (DAILY MULTIPLE) tablet Take 1 Tab by mouth daily. 30 Tab 6    peg 400-propylene glycol (SYSTANE) 0.4-0.3 % drop Administer  to left eye as needed.           ALLERGIES:   Allergies   Allergen Reactions    Pcn [Penicillins] Other (comments)     Unknown-happened when she was very young        MEDICAL/SURGICAL HISTORY:  Past Medical History:   Diagnosis Date    ACS (acute coronary syndrome) ([facility]     NSTEMI    Arthritis     Breast cancer ([facility] 2007    right breast (radiation)    Dementia     Diabetes ([facility]     diet controlled    GERD (gastroesophageal reflux disease)     Glaucoma     Hypertension       Past Surgical History:   Procedure Laterality Date    HX APPENDECTOMY      HX BREAST BIOPSY      HX CHOLECYSTECTOMY          FAMILY HISTORY:  Family History   Problem Relation Age of Onset    Cancer Mother     Cancer Father     Diabetes Sister     Diabetes Paternal Grandmother     Cancer Other         SOCIAL HISTORY:  Social History   Substance Use Topics    Smoking status: Never Smoker    Smokeless tobacco: Never Used    Alcohol use No            HISTORY OF PRESENT ILLNESS:   Viarl Dong is a 80 y.o. female who presents to the office to establish care as a new patient. She is transferring her care from [facility], and is known to me from that office location. She is accompanied by her daughter, Denys Kirby. Diabetes -  Currently the patient's condition is well controlled on diet alone  Last A1c: 5.0% on 1/23/18   She is no longer checking her blood sugars at home. Last urine microalbumin: overdue, but patient is not able to do screening due to dementia. Last lipid panel: 10/25/16, LDL 65 at that time. Patient is on statin therapy    Glaucoma -   Patient is followed closely by ophthalmology - Dr. Radha Kramer and Dr. Dena Young   On a new eye drop per daughter    Hypertension -   Currently, the patient's condition is stable. BP was a little high on intake, but improved on re-check. Reports compliance with the following regimen: lisinopril 5 mg daily.  This was decreased from 10 mg daily within the past year due to issues with hypotension     GERD -   Stable, well controlled on Zantac, when daughter can get patient to take her meds. Daughter reports that patient is complaining of abdominal discomfort today because she did not take her Zantac this morning    Sebaceous Cyst -   Acutely, patient complains of a bump on the left side of her face. It has been there for a few months and is getting larger. She had a similar lesion a while back, which became an abscess and requried incision and drainage. Patient reports it is not painful right now, but it does bother her. Daughter reports it occasionally drains some thin gray fluid. REVIEW OF SYSTEMS:  Review of Systems   Constitutional: Negative for chills, fever and malaise/fatigue. Respiratory: Negative for cough, shortness of breath and wheezing. Cardiovascular: Negative for chest pain, palpitations and leg swelling. Gastrointestinal: Positive for abdominal pain and heartburn ( per daughter patient is complaining of GERD today because she did not take her Zantac this morning). Negative for constipation, diarrhea, nausea and vomiting. Psychiatric/Behavioral: Positive for memory loss. PHYSICAL EXAMINATION:  Physical Exam   Constitutional: She is oriented to person, place, and time and well-developed, well-nourished, and in no distress. HENT:   Approx 3 x 2 cm sebaceous cyst to left jaw   Cardiovascular: Normal rate, regular rhythm and normal heart sounds. Exam reveals no gallop and no friction rub. No murmur heard. Pulmonary/Chest: Effort normal and breath sounds normal. She has no wheezes. She has no rales. Neurological: She is alert and oriented to person, place, and time. Gait normal.   Skin: Skin is warm and dry. No rash noted. Psychiatric: Mood and affect normal.   dementia        RESULTS:  No results found for this visit on 07/09/18. ASSESSMENT/PLAN:  Diagnoses and all orders for this visit:    1.  Type 2 diabetes mellitus without complication, without long-term current use of insulin ([facility])  - Check labs today  Orders:    -     METABOLIC PANEL, COMPREHENSIVE; Future  -     LIPID PANEL; Future  -     HEMOGLOBIN A1C W/O EAG; Future    2. Essential hypertension  - BP elevated on intake but improved on re-check  - Continue with lisinopril 5 mg daily  Orders:    -     lisinopril (PRINIVIL, ZESTRIL) 10 mg tablet; Take 0.5 Tabs by mouth daily. 3. Gastroesophageal reflux disease, esophagitis presence not specified  - Well controlled on Zantac when patient takes her medication. Will continue with this regimen for the time being. 4. Dementia with behavioral disturbance, unspecified dementia type  - Stable  - Managed by neurology  - Records reviewed    5. Sebaceous cyst  - Does not appear to be acutely infected, but has required I&D for infection/abscess in the past, and it is bothersome to the patient  - Will refer for plastic surgery consultation to see about having the cyst removed  Orders:    -     REFERRAL TO PLASTIC SURGERY      Follow-up Disposition:  Return in about 3 months (around 10/9/2018) for Medicare Wellness.        PATIENT CARE TEAM:   Patient Care Team:  DIAN Nogueira as PCP - General (Physician Assistant)  Ervin Mederos MD as Consulting Provider (Neurology)  Tra Overton MD (Cardiology)       DIAN Nogueira   July 9, 2018    2:42 PM

## 2018-07-09 NOTE — PROGRESS NOTES
Chief Complaint   Patient presents with    Diabetes    Hypertension    Dementia    GERD    Establish Care     Visit Vitals    /82 (BP 1 Location: Right arm, BP Patient Position: Sitting)    Pulse 69    Temp 98.3 °F (36.8 °C) (Oral)    Resp 12    Ht 5' 7\" (1.702 m)    SpO2 97%     Patient is not fasting. Patient in room # 5.     1. Have you been to the ER, urgent care clinic since your last visit? Hospitalized since your last visit? No    2. Have you seen or consulted any other health care providers outside of the [address] [place] [address] since your last visit? Include any pap smears or colon screening. Yes When: 06/2018 Where: [facility]  Reason for visit: Foot exam, Ming Naidu, 06/05/2018 Eye Provider, Eye injections,     Reviewed. Flowsheet, Learning needs,  ADL, Fall and PHQ  completed.

## 2018-07-09 NOTE — MR AVS SNAPSHOT
[address] [facility] [address] 200 [facility] 
[phone] Patient: Yoko Post MRN: [id number] UBP:[birth year] Visit Information Date & Time Provider Department Dept. Phone Encounter #  
 7/9/2018  1:30 PM Jalen Bueno, [address] [place] [address] Road [id number] Follow-up Instructions Return in about 3 months (around 10/9/2018) for Medicare Wellness. Your Appointments 8/20/2018 10:00 AM  
ESTABLISHED PATIENT with Ashtyn Xiao MD  
[facility]) Appt Note: 6 months [address] [facility], [address] [facility] [address] 6419 [facility], [address] [place] [address] Upcoming Health Maintenance Date Due  
 FOOT EXAM Q1 9/29/2015 EYE EXAM RETINAL OR DILATED Q1 5/11/2016 Pneumococcal 65+ High/Highest Risk (2 of 2 - PPSV23) 11/4/2016 LIPID PANEL Q1 10/25/2017 HEMOGLOBIN A1C Q6M 7/23/2018 Influenza Age 5 to Adult 8/1/2018 MEDICARE YEARLY EXAM 9/12/2018 DTaP/Tdap/Td series (2 - Td) 9/9/2026 Allergies as of 7/9/2018  Review Complete On: 7/9/2018 By: DIAN Driscoll Severity Noted Reaction Type Reactions Pcn [Penicillins]  03/05/2013    Other (comments) Unknown-happened when she was very young Current Immunizations  Reviewed on 10/19/2016 Name Date Influenza High Dose Vaccine PF 9/11/2017, 9/9/2016 Influenza Vaccine 12/29/2014, 12/26/2013 Not reviewed this visit You Were Diagnosed With   
  
 Codes Comments Type 2 diabetes mellitus without complication, without long-term current use of insulin (HCC)    -  Primary ICD-10-CM: E11.9 ICD-9-CM: 250.00 Essential hypertension     ICD-10-CM: I10 
ICD-9-CM: 401.9 Gastroesophageal reflux disease, esophagitis presence not specified     ICD-10-CM: K21.9 ICD-9-CM: 530.81   
 Dementia with behavioral disturbance, unspecified dementia type     ICD-10-CM: F03.91 
ICD-9-CM: 294.21 Sebaceous cyst     ICD-10-CM: L72.3 ICD-9-CM: 706. 2 Vitals BP Pulse Temp Resp Height(growth percentile) SpO2  
 142/82 69 98.3 °F (36.8 °C) (Oral) 12 5' 7\" (1.702 m) 97% OB Status Smoking Status Postmenopausal Never Smoker Vitals History Preferred Pharmacy Pharmacy Name Phone Radha Schmitz [address] [place] [address] [facility] [address] [phone] Your Updated Medication List  
  
   
This list is accurate as of 7/9/18  2:16 PM.  Always use your most recent med list.  
  
  
  
  
 aspirin delayed-release 81 mg tablet  
take 1 tablet by mouth once daily  
  
 atorvastatin 80 mg tablet Commonly known as:  LIPITOR  
take 1 tablet by mouth once daily at bedtime CALCIUM 600 600 mg calcium (1,500 mg) tablet Generic drug:  calcium carbonate  
take 1 tablet by mouth twice a day  
  
 dorzolamide 2 % ophthalmic solution Commonly known as:  TRUSOPT Administer 2 Drops to both eyes three (3) times daily. ferrous sulfate 324 mg (65 mg iron) tablet  
take 1 tablet by mouth twice a day  
  
 hydrocortisone 1 % lotion Commonly known as:  ALA-DEEDEE Apply  to affected area two (2) times a day. use thin layer  
  
 latanoprost 0.005 % ophthalmic solution Commonly known as:  Brady Rajan Administer 1 Drop to both eyes nightly. lisinopril 10 mg tablet Commonly known as:  Darby Escort Take 0.5 Tabs by mouth daily. memantine 10 mg tablet Commonly known as:  NAMENDA  
1 q am  
  
 multivitamin tablet Commonly known as:  DAILY MULTIPLE Take 1 Tab by mouth daily. raNITIdine 150 mg tablet Commonly known as:  ZANTAC TAKE 1 TABLET TWICE DAILY  
  
 SYSTANE (PROPYLENE GLYCOL) 0.4-0.3 % Drop Generic drug:  peg 400-propylene glycol Administer  to left eye as needed. TYLENOL EXTRA STRENGTH 500 mg tablet Generic drug:  acetaminophen Take  by mouth every six (6) hours as needed for Pain. Prescriptions Sent to Pharmacy Refills  
 lisinopril (PRINIVIL, ZESTRIL) 10 mg tablet 2 Sig: Take 0.5 Tabs by mouth daily. Class: Normal  
 Pharmacy: [address] [place] [address], [facility]  #: [phone] Route: Oral  
  
Follow-up Instructions Return in about 3 months (around 10/9/2018) for Medicare Wellness. To-Do List   
 07/09/2018 Lab:  HEMOGLOBIN A1C W/O EAG   
  
 07/09/2018 Lab:  METABOLIC PANEL, COMPREHENSIVE Around 07/10/2018 Lab:  LIPID PANEL Patient Instructions A Healthy Lifestyle: Care Instructions Your Care Instructions A healthy lifestyle can help you feel good, stay at a healthy weight, and have plenty of energy for both work and play. A healthy lifestyle is something you can share with your whole family. A healthy lifestyle also can lower your risk for serious health problems, such as high blood pressure, heart disease, and diabetes. You can follow a few steps listed below to improve your health and the health of your family. Follow-up care is a key part of your treatment and safety. Be sure to make and go to all appointments, and call your doctor if you are having problems. It's also a good idea to know your test results and keep a list of the medicines you take. How can you care for yourself at home? · Do not eat too much sugar, fat, or fast foods. You can still have dessert and treats now and then. The goal is moderation. · Start small to improve your eating habits. Pay attention to portion sizes, drink less juice and soda pop, and eat more fruits and vegetables. ¨ Eat a healthy amount of food. A 3-ounce serving of meat, for example, is about the size of a deck of cards. Fill the rest of your plate with vegetables and whole grains. ¨ Limit the amount of soda and sports drinks you have every day. Drink more water when you are thirsty. ¨ Eat at least 5 servings of fruits and vegetables every day. It may seem like a lot, but it is not hard to reach this goal. A serving or helping is 1 piece of fruit, 1 cup of vegetables, or 2 cups of leafy, raw vegetables. Have an apple or some carrot sticks as an afternoon snack instead of a candy bar. Try to have fruits and/or vegetables at every meal. 
· Make exercise part of your daily routine. You may want to start with simple activities, such as walking, bicycling, or slow swimming. Try to be active 30 to 60 minutes every day. You do not need to do all 30 to 60 minutes all at once. For example, you can exercise 3 times a day for 10 or 20 minutes. Moderate exercise is safe for most people, but it is always a good idea to talk to your doctor before starting an exercise program. 
· Keep moving. Mary Guardado the lawn, work in the garden, or Ksplice. Take the stairs instead of the elevator at work. · If you smoke, quit. People who smoke have an increased risk for heart attack, stroke, cancer, and other lung illnesses. Quitting is hard, but there are ways to boost your chance of quitting tobacco for good. ¨ Use nicotine gum, patches, or lozenges. ¨ Ask your doctor about stop-smoking programs and medicines. ¨ Keep trying. In addition to reducing your risk of diseases in the future, you will notice some benefits soon after you stop using tobacco. If you have shortness of breath or asthma symptoms, they will likely get better within a few weeks after you quit. · Limit how much alcohol you drink. Moderate amounts of alcohol (up to 2 drinks a day for men, 1 drink a day for women) are okay. But drinking too much can lead to liver problems, high blood pressure, and other health problems. Family health If you have a family, there are many things you can do together to improve your health. · Eat meals together as a family as often as possible. · Eat healthy foods. This includes fruits, vegetables, lean meats and dairy, and whole grains. · Include your family in your fitness plan. Most people think of activities such as jogging or tennis as the way to fitness, but there are many ways you and your family can be more active. Anything that makes you breathe hard and gets your heart pumping is exercise. Here are some tips: 
¨ Walk to do errands or to take your child to school or the bus. ¨ Go for a family bike ride after dinner instead of watching TV. Where can you learn more? Go to http://michellUpDowneverardo.info/. Enter E310 in the search box to learn more about \"A Healthy Lifestyle: Care Instructions. \" Current as of: May 12, 2017 Content Version: 11.4 © 2092-3207 Send the Trend. Care instructions adapted under license by [employer] (which disclaims liability or warranty for this information). If you have questions about a medical condition or this instruction, always ask your healthcare professional. [address] any warranty or liability for your use of this information. Introducing [facility] & HEALTH SERVICES! Dear Real Hoang: 
Thank you for requesting a [employer] account. Our records indicate that you already have an active Flash[employer] account. You can access your account anytime at [email or web address] Did you know that you can access your hospital and ER discharge instructions at any time in Flash[employer]? You can also review all of your test results from your hospital stay or ER visit. Additional Information If you have questions, please visit the Frequently Asked Questions section of the [employer] website at [email or web address] Remember, Flashstarts is NOT to be used for urgent needs. For medical emergencies, dial 911. Now available from your iPhone and Android! Please provide this summary of care documentation to your next provider. Your primary care clinician is listed as Marcella Goode. If you have any questions after today's visit, please call [phone].

## 2018-07-10 ENCOUNTER — TELEPHONE (OUTPATIENT)
Dept: FAMILY MEDICINE CLINIC | Age: 83
End: 2018-07-10

## 2018-07-10 NOTE — TELEPHONE ENCOUNTER
Dr Fareed Anaya office called and stated the don't except pt insurance. Please refer pt to another plastic surgeon.

## 2018-07-16 ENCOUNTER — HOSPITAL ENCOUNTER (OUTPATIENT)
Dept: LAB | Age: 83
Discharge: HOME | End: 2018-07-16
Payer: MEDICARE

## 2018-07-16 DIAGNOSIS — E11.9 TYPE 2 DIABETES MELLITUS WITHOUT COMPLICATION, WITHOUT LONG-TERM CURRENT USE OF INSULIN (HCC): ICD-10-CM

## 2018-07-16 LAB
ALBUMIN SERPL-MCNC: 3.5 G/DL (ref 3.4–5)
ALBUMIN/GLOB SERPL: 0.9 {RATIO} (ref 0.8–1.7)
ALP SERPL-CCNC: 67 U/L (ref 45–117)
ALT SERPL-CCNC: 20 U/L (ref 13–56)
ANION GAP SERPL CALC-SCNC: 7 MMOL/L (ref 3–18)
AST SERPL-CCNC: 21 U/L (ref 15–37)
BILIRUB SERPL-MCNC: 0.7 MG/DL (ref 0.2–1)
BUN SERPL-MCNC: 20 MG/DL (ref 7–18)
BUN/CREAT SERPL: 27 (ref 12–20)
CALCIUM SERPL-MCNC: 9 MG/DL (ref 8.5–10.1)
CHLORIDE SERPL-SCNC: 107 MMOL/L (ref 100–108)
CHOLEST SERPL-MCNC: 135 MG/DL
CO2 SERPL-SCNC: 28 MMOL/L (ref 21–32)
CREAT SERPL-MCNC: 0.74 MG/DL (ref 0.6–1.3)
EST. AVERAGE GLUCOSE BLD GHB EST-MCNC: 97 MG/DL
GLOBULIN SER CALC-MCNC: 3.9 G/DL (ref 2–4)
GLUCOSE SERPL-MCNC: 82 MG/DL (ref 74–99)
HBA1C MFR BLD: 5 % (ref 4.2–5.6)
HDLC SERPL-MCNC: 71 MG/DL (ref 40–60)
HDLC SERPL: 1.9 {RATIO} (ref 0–5)
LDLC SERPL CALC-MCNC: 53.8 MG/DL (ref 0–100)
LIPID PROFILE,FLP: ABNORMAL
POTASSIUM SERPL-SCNC: 3.9 MMOL/L (ref 3.5–5.5)
PROT SERPL-MCNC: 7.4 G/DL (ref 6.4–8.2)
SODIUM SERPL-SCNC: 142 MMOL/L (ref 136–145)
TRIGL SERPL-MCNC: 51 MG/DL (ref ?–150)
VLDLC SERPL CALC-MCNC: 10.2 MG/DL

## 2018-07-16 PROCEDURE — 36415 COLL VENOUS BLD VENIPUNCTURE: CPT | Performed by: PHYSICIAN ASSISTANT

## 2018-07-16 PROCEDURE — 80053 COMPREHEN METABOLIC PANEL: CPT | Performed by: PHYSICIAN ASSISTANT

## 2018-07-16 PROCEDURE — 83036 HEMOGLOBIN GLYCOSYLATED A1C: CPT | Performed by: PHYSICIAN ASSISTANT

## 2018-07-16 PROCEDURE — 80061 LIPID PANEL: CPT | Performed by: PHYSICIAN ASSISTANT

## 2018-08-08 DIAGNOSIS — Z76.0 MEDICATION REFILL: ICD-10-CM

## 2018-08-08 DIAGNOSIS — K21.00 GASTROESOPHAGEAL REFLUX DISEASE WITH ESOPHAGITIS: ICD-10-CM

## 2018-08-08 RX ORDER — RANITIDINE 150 MG/1
TABLET, FILM COATED ORAL
Qty: 180 TAB | Refills: 0 | Status: SHIPPED | OUTPATIENT
Start: 2018-08-08 | End: 2018-10-10 | Stop reason: SDUPTHER

## 2018-09-06 ENCOUNTER — OFFICE VISIT (OUTPATIENT)
Dept: CARDIOLOGY CLINIC | Age: 83
End: 2018-09-06

## 2018-09-06 VITALS
DIASTOLIC BLOOD PRESSURE: 73 MMHG | WEIGHT: 130 LBS | HEART RATE: 66 BPM | SYSTOLIC BLOOD PRESSURE: 148 MMHG | BODY MASS INDEX: 20.4 KG/M2 | HEIGHT: 67 IN

## 2018-09-06 DIAGNOSIS — I10 ESSENTIAL HYPERTENSION: Primary | ICD-10-CM

## 2018-09-06 DIAGNOSIS — E11.9 TYPE 2 DIABETES MELLITUS WITHOUT COMPLICATION, WITHOUT LONG-TERM CURRENT USE OF INSULIN (HCC): ICD-10-CM

## 2018-09-06 DIAGNOSIS — R00.1 BRADYCARDIA: ICD-10-CM

## 2018-09-06 DIAGNOSIS — F02.818 DEMENTIA ASSOCIATED WITH OTHER UNDERLYING DISEASE WITH BEHAVIORAL DISTURBANCE: ICD-10-CM

## 2018-09-06 NOTE — MR AVS SNAPSHOT
[address] [facility] [address] 
 
 
 [address] [facility], [address] [facility] [address] 
[phone] Patient: Mumtaz Newman MRN: [id number] SSD:9[birth year]0/[birth year]996 Visit Information Date & Time Provider Department Dept. Phone Encounter #  
 9/6/2018 12:30 PM Ry Jj [facility] [address] [place] [address] [id number] Follow-up Instructions Return in about 1 year (around 9/6/2019). Your Appointments 9/6/2018 12:30 PM  
Office Visit with Ry Jj MD  
[facility]) Appt Note: 6 months/ confirmed, cant come in any earlier; 6 months [address] [facility], [address] [facility] [address]  
[address] [facility] [place], [address] [place] [address]  
  
    
 10/9/2018  1:00 PM  
Office Visit with DAIN [facility] ([facility]) Appt Note: AWV  and 3mo f/u  
 [address] [facility] [address] [facility]  
[address] [facility] [address] [facility]  
  
    
 9/2/2019 10:00 AM  
Office Visit with Ry Jj MD  
[facility]) Appt Note: 1 yr  
 [address] [facility], [address] [facility] [address]  
[phone] Upcoming Health Maintenance Date Due  
 FOOT EXAM Q1 9/29/2015 Pneumococcal 65+ High/Highest Risk (2 of 2 - PPSV23) 11/4/2016 Influenza Age 5 to Adult 8/1/2018 MEDICARE YEARLY EXAM 9/12/2018 HEMOGLOBIN A1C Q6M 1/16/2019 LIPID PANEL Q1 7/16/2019 EYE EXAM RETINAL OR DILATED Q1 7/30/2019 DTaP/Tdap/Td series (2 - Td) 9/9/2026 Allergies as of 9/6/2018  Review Complete On: 9/6/2018 By: Ry Jj MD  
  
 Severity Noted Reaction Type Reactions Pcn [Penicillins]  03/05/2013    Other (comments) Unknown-happened when she was very young Current Immunizations  Reviewed on 10/19/2016 Name Date Influenza High Dose Vaccine PF 9/11/2017, 9/9/2016 Influenza Vaccine 12/29/2014, 12/26/2013 Not reviewed this visit You Were Diagnosed With   
  
 Codes Comments Essential hypertension    -  Primary ICD-10-CM: I10 
ICD-9-CM: 401.9 Stable. Continue treatment Type 2 diabetes mellitus without complication, without long-term current use of insulin (HCC)     ICD-10-CM: E11.9 ICD-9-CM: 250.00 Dementia associated with other underlying disease with behavioral disturbance     ICD-10-CM: F02.81 ICD-9-CM: 294.8, 294.11 Stable. has sleep disturbance Bradycardia     ICD-10-CM: R00.1 ICD-9-CM: 427.89 Resolved after stopping verapamil. Blood pressure fairly well controlled Vitals BP Pulse Height(growth percentile) Weight(growth percentile) BMI OB Status 148/73 66 5' 7\" (1.702 m) 130 lb (59 kg) 20.36 kg/m2 Postmenopausal  
 Smoking Status Never Smoker Vitals History BMI and BSA Data Body Mass Index Body Surface Area  
 20.36 kg/m 2 1.67 m 2 Preferred Pharmacy Pharmacy Name Phone [address] 1220 [facility] [address] [phone] Your Updated Medication List  
  
   
This list is accurate as of 9/6/18 12:07 PM.  Always use your most recent med list.  
  
  
  
  
 aspirin delayed-release 81 mg tablet  
take 1 tablet by mouth once daily  
  
 atorvastatin 80 mg tablet Commonly known as:  LIPITOR  
take 1 tablet by mouth once daily at bedtime CALCIUM 600 600 mg calcium (1,500 mg) tablet Generic drug:  calcium carbonate  
take 1 tablet by mouth twice a day  
  
 dorzolamide 2 % ophthalmic solution Commonly known as:  TRUSOPT Administer 2 Drops to both eyes three (3) times daily. ferrous sulfate 324 mg (65 mg iron) tablet  
take 1 tablet by mouth twice a day  
  
 hydrocortisone 1 % lotion Commonly known as:  ALA-DEEDEE Apply  to affected area two (2) times a day. use thin layer latanoprost 0.005 % ophthalmic solution Commonly known as:  Tonye Rist Administer 1 Drop to both eyes nightly. lisinopril 10 mg tablet Commonly known as:  PRINIVIL, ZESTRIL  
TAKE 1/2 TABLET ONE TIME DAILY  
  
 memantine 10 mg tablet Commonly known as:  NAMENDA  
1 q am  
  
 multivitamin tablet Commonly known as:  DAILY MULTIPLE Take 1 Tab by mouth daily. raNITIdine 150 mg tablet Commonly known as:  ZANTAC TAKE 1 TABLET TWICE DAILY  
  
 SYSTANE (PROPYLENE GLYCOL) 0.4-0.3 % Drop Generic drug:  peg 400-propylene glycol Administer  to left eye as needed. TYLENOL EXTRA STRENGTH 500 mg tablet Generic drug:  acetaminophen Take  by mouth every six (6) hours as needed for Pain. Follow-up Instructions Return in about 1 year (around 9/6/2019). Introducing [facility] & HEALTH SERVICES! Dear Evan Congress: 
Thank you for requesting a [employer] account. Our records indicate that you already have an active [employer] account. You can access your account anytime at [email or web address] Did you know that you can access your hospital and ER discharge instructions at any time in [employer]? You can also review all of your test results from your hospital stay or ER visit. Additional Information If you have questions, please visit the Frequently Asked Questions section of the [employer] website at [email or web address]. Remember, [employer] is NOT to be used for urgent needs. For medical emergencies, dial 911. Now available from your iPhone and Android! Please provide this summary of care documentation to your next provider. Your primary care clinician is listed as Nereida Posadas. If you have any questions after today's visit, please call [phone].

## 2018-09-06 NOTE — LETTER
Betsy Villafuerte 2/3/1933 
 
9/6/2018 Dear DIAN Elmore MD 
 
I had the pleasure of evaluating  Ms. Keyshawn Miller in office today. Below are the relevant portions of my assessment and plan of care. ICD-10-CM ICD-9-CM 1. Essential hypertension I10 401.9 Stable. Continue treatment 2. Type 2 diabetes mellitus without complication, without long-term current use of insulin (HCC) E11.9 250.00 3. Dementia associated with other underlying disease with behavioral disturbance F02.81 294.8   
  294.11 Stable. has sleep disturbance 4. Bradycardia R00.1 427.89 Resolved after stopping verapamil. Blood pressure fairly well controlled Current Outpatient Prescriptions Medication Sig Dispense Refill  lisinopril (PRINIVIL, ZESTRIL) 10 mg tablet TAKE 1/2 TABLET ONE TIME DAILY 45 Tab 0  
 raNITIdine (ZANTAC) 150 mg tablet TAKE 1 TABLET TWICE DAILY 180 Tab 0  
 memantine (NAMENDA) 10 mg tablet 1 q am 90 Tab 4  
 atorvastatin (LIPITOR) 80 mg tablet take 1 tablet by mouth once daily at bedtime 90 Tab 2  
 hydrocortisone (ALA-DEEDEE) 1 % lotion Apply  to affected area two (2) times a day. use thin layer 60 g 2  
 ferrous sulfate 324 mg (65 mg iron) tablet take 1 tablet by mouth twice a day 60 Tab 1  
 CALCIUM 600 600 mg calcium (1,500 mg) tablet take 1 tablet by mouth twice a day 60 Tab 6  
 acetaminophen (TYLENOL EXTRA STRENGTH) 500 mg tablet Take  by mouth every six (6) hours as needed for Pain.  dorzolamide (TRUSOPT) 2 % ophthalmic solution Administer 2 Drops to both eyes three (3) times daily.  aspirin delayed-release 81 mg tablet take 1 tablet by mouth once daily 30 Tab 6  
 latanoprost (XALATAN) 0.005 % ophthalmic solution Administer 1 Drop to both eyes nightly.  multivitamin (DAILY MULTIPLE) tablet Take 1 Tab by mouth daily. 30 Tab 6  peg 400-propylene glycol (SYSTANE) 0.4-0.3 % drop Administer  to left eye as needed. No orders of the defined types were placed in this encounter. If you have questions, please do not hesitate to call me. I look forward to following Ms. Yogihussein Barbosa along with you. Sincerely, Aditya Ruiz MD

## 2018-09-06 NOTE — PROGRESS NOTES
Patient brought medications list    1. Have you been to the ER, urgent care clinic since your last visit? Hospitalized since your last visit? No    2. Have you seen or consulted any other health care providers outside of the [facility] since your last visit? Include any pap smears or colon screening.  Yes Where: Opthamology Reason for visit: Routine

## 2018-09-06 NOTE — PROGRESS NOTES
HISTORY OF PRESENT ILLNESS  Preston Moore is a 80 y.o. female. HPI Comments: Admitted 10/2017  Bradycardia-AMS-off verapamil since then  No significant symptoms    Hypertension   The history is provided by the patient. This is a chronic problem. The problem occurs constantly. The problem has not changed since onset. Pertinent negatives include no chest pain, no abdominal pain, no headaches and no shortness of breath. Nothing aggravates the symptoms. Slow Heart Rate   The history is provided by the patient. This is a chronic problem. The problem occurs constantly. The problem has not changed since onset. Pertinent negatives include no chest pain, no abdominal pain, no headaches and no shortness of breath. Nothing aggravates the symptoms. Nothing relieves the symptoms. Review of Systems   Constitutional: Negative for chills and fever. HENT: Negative for nosebleeds. Eyes: Negative for blurred vision and double vision. Respiratory: Negative for cough, hemoptysis, sputum production, shortness of breath and wheezing. Cardiovascular: Negative for chest pain, palpitations, orthopnea, claudication, leg swelling and PND. Gastrointestinal: Negative for abdominal pain, heartburn, nausea and vomiting. Musculoskeletal: Negative for myalgias. Skin: Negative for rash. Neurological: Negative for dizziness, weakness and headaches. Endo/Heme/Allergies: Does not bruise/bleed easily.      Family History   Problem Relation Age of Onset    Cancer Mother     Cancer Father     Diabetes Sister     Diabetes Paternal Grandmother     Cancer Other        Past Medical History:   Diagnosis Date    ACS (acute coronary syndrome) ([address]     NSTEMI    Arthritis     Breast cancer ([address] 2007    right breast (radiation)    Dementia     Diabetes ([address]     diet controlled    GERD (gastroesophageal reflux disease)     Glaucoma     Hypertension        Past Surgical History:   Procedure Laterality Date    HX APPENDECTOMY      HX BREAST BIOPSY      HX CHOLECYSTECTOMY         Social History   Substance Use Topics    Smoking status: Never Smoker    Smokeless tobacco: Never Used    Alcohol use No       Allergies   Allergen Reactions    Pcn [Penicillins] Other (comments)     Unknown-happened when she was very young       Prior to Admission medications    Medication Sig Start Date End Date Taking? Authorizing Provider   lisinopril (PRINIVIL, ZESTRIL) 10 mg tablet TAKE 1/2 TABLET ONE TIME DAILY 9/4/18  Yes DIAN Adams   raNITIdine (ZANTAC) 150 mg tablet TAKE 1 TABLET TWICE DAILY 8/8/18  Yes DIAN Adams   memantine (NAMENDA) 10 mg tablet 1 q am 5/2/18  Yes Libby Colunga NP   atorvastatin (LIPITOR) 80 mg tablet take 1 tablet by mouth once daily at bedtime 4/5/18  Yes Porsha Garcia MD   hydrocortisone (ALA-DEEDEE) 1 % lotion Apply  to affected area two (2) times a day. use thin layer 4/5/18  Yes Porsha Garcia MD   ferrous sulfate 324 mg (65 mg iron) tablet take 1 tablet by mouth twice a day 2/28/18  Yes Cirstobal Avilez PA-C   CALCIUM 600 600 mg calcium (1,500 mg) tablet take 1 tablet by mouth twice a day 2/15/18  Yes Porsha Garcia MD   acetaminophen (TYLENOL EXTRA STRENGTH) 500 mg tablet Take  by mouth every six (6) hours as needed for Pain. Yes Historical Provider   dorzolamide (TRUSOPT) 2 % ophthalmic solution Administer 2 Drops to both eyes three (3) times daily. Yes Historical Provider   aspirin delayed-release 81 mg tablet take 1 tablet by mouth once daily 12/26/17  Yes Porsha Garcia MD   latanoprost (XALATAN) 0.005 % ophthalmic solution Administer 1 Drop to both eyes nightly. Yes Historical Provider   multivitamin (DAILY MULTIPLE) tablet Take 1 Tab by mouth daily. 10/13/15  Yes Raul Mejia NP   peg 400-propylene glycol (SYSTANE) 0.4-0.3 % drop Administer  to left eye as needed.    Yes Historical Provider         Visit Vitals    /73    Pulse 66    Ht 5' 7\" (1.702 m)    Wt 59 kg (130 lb)  Comment: Pt in wheel chair, verbal weight    BMI 20.36 kg/m2         Physical Exam   Constitutional: She is oriented to person, place, and time. She appears well-developed and well-nourished. HENT:   Head: Normocephalic and atraumatic. Eyes: Conjunctivae are normal.   Neck: Neck supple. No JVD present. No tracheal deviation present. No thyromegaly present. Cardiovascular: Normal rate and regular rhythm. PMI is not displaced. Exam reveals no gallop, no S3 and no decreased pulses. No murmur heard. Pulmonary/Chest: No respiratory distress. She has no wheezes. She has no rales. She exhibits no tenderness. Abdominal: Soft. There is no tenderness. Musculoskeletal: She exhibits no edema. Neurological: She is alert and oriented to person, place, and time. Skin: Skin is warm. Psychiatric: She has a normal mood and affect. Ms. Tong Morales has a reminder for a \"due or due soon\" health maintenance. I have asked that she contact her primary care provider for follow-up on this health maintenance. SUMMARY:10/2017  Left ventricle: Ejection fraction was estimated in the range of 55 % to 60 %. No obvious wall motion abnormalities  identified in the views obtained. Wall thickness was mildly increased. Pulmonic valve: There was mild regurgitation. 10/2017  Abnormal ECG. 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 Troponin unremarkable. Normal LV function with EF 50-55% by echo 10/2016.   -Advanced age, dementia, full code. Chronic sinus bradycardia, previously intolerant to BB, on verapamil as outpatient. No significant pauses overnight      Assessment         ICD-10-CM ICD-9-CM    1. Essential hypertension I10 401.9     Stable. Continue treatment   2. Type 2 diabetes mellitus without complication, without long-term current use of insulin (HCC) E11.9 250.00    3.  Dementia associated with other underlying disease with behavioral disturbance F02.81 294.8      294.11     Stable. has sleep disturbance   4. Bradycardia R00.1 427.89     Resolved after stopping verapamil. Blood pressure fairly well controlled   family has decided against pacemaker in past    9/2018  Bradycardia stable after verapamil was discontinued. Blood pressure fairly well controlled on lisinopril. Leg edema better  There are no discontinued medications. No orders of the defined types were placed in this encounter. Follow-up Disposition:  Return in about 1 year (around 9/6/2019).

## 2018-10-10 DIAGNOSIS — Z76.0 MEDICATION REFILL: ICD-10-CM

## 2018-10-10 DIAGNOSIS — K21.00 GASTROESOPHAGEAL REFLUX DISEASE WITH ESOPHAGITIS: ICD-10-CM

## 2018-10-10 RX ORDER — RANITIDINE 150 MG/1
TABLET, FILM COATED ORAL
Qty: 180 TAB | Refills: 0 | Status: SHIPPED | OUTPATIENT
Start: 2018-10-10 | End: 2018-12-11 | Stop reason: SDUPTHER

## 2018-10-15 ENCOUNTER — TELEPHONE ANTICOAG (OUTPATIENT)
Dept: CARDIOLOGY CLINIC | Age: 83
End: 2018-10-15

## 2018-10-15 ENCOUNTER — OFFICE VISIT (OUTPATIENT)
Dept: FAMILY MEDICINE CLINIC | Age: 83
End: 2018-10-15

## 2018-10-15 VITALS
DIASTOLIC BLOOD PRESSURE: 78 MMHG | OXYGEN SATURATION: 99 % | HEIGHT: 67 IN | HEART RATE: 61 BPM | TEMPERATURE: 97.5 F | RESPIRATION RATE: 12 BRPM | SYSTOLIC BLOOD PRESSURE: 144 MMHG

## 2018-10-15 DIAGNOSIS — E11.9 TYPE 2 DIABETES MELLITUS WITHOUT COMPLICATION, WITHOUT LONG-TERM CURRENT USE OF INSULIN (HCC): Primary | ICD-10-CM

## 2018-10-15 DIAGNOSIS — K21.9 GASTROESOPHAGEAL REFLUX DISEASE, ESOPHAGITIS PRESENCE NOT SPECIFIED: ICD-10-CM

## 2018-10-15 DIAGNOSIS — Z23 ENCOUNTER FOR IMMUNIZATION: ICD-10-CM

## 2018-10-15 DIAGNOSIS — Z00.00 MEDICARE ANNUAL WELLNESS VISIT, SUBSEQUENT: ICD-10-CM

## 2018-10-15 DIAGNOSIS — F03.90 DEMENTIA WITHOUT BEHAVIORAL DISTURBANCE, UNSPECIFIED DEMENTIA TYPE: ICD-10-CM

## 2018-10-15 DIAGNOSIS — I10 ESSENTIAL HYPERTENSION: ICD-10-CM

## 2018-10-15 RX ORDER — LISINOPRIL 5 MG/1
5 TABLET ORAL DAILY
Qty: 90 TAB | Refills: 1 | Status: SHIPPED | OUTPATIENT
Start: 2018-10-15 | End: 2019-01-01 | Stop reason: SDUPTHER

## 2018-10-15 RX ORDER — ATORVASTATIN CALCIUM 80 MG/1
TABLET, FILM COATED ORAL
Qty: 90 TAB | Refills: 1 | Status: SHIPPED | COMMUNITY
Start: 2018-10-15 | End: 2018-10-23 | Stop reason: SDUPTHER

## 2018-10-15 NOTE — MR AVS SNAPSHOT
[facility] [address] [facility] 
[phone] Patient: Courtney Amin MRN: [id number] RODO:[birth year] Visit Information Date & Time Provider Department Dept. Phone Encounter #  
 10/15/2018  1:00 PM Bella Ohara, [address] [facility] [id number] Follow-up Instructions Return in about 6 months (around 4/15/2019) for routine care. Your Appointments 9/2/2019 10:00 AM  
Office Visit with Cortes Zamora MD  
[facility]) Appt Note: 1 yr  
 [address] [facility], [address] [facility] [address] 7009 [facility], [address] [place] [address] Upcoming Health Maintenance Date Due Shingrix Vaccine Age 50> (1 of 2) 2/3/1983 FOOT EXAM Q1 9/29/2015 Pneumococcal 65+ High/Highest Risk (2 of 2 - PPSV23) 11/4/2016 Influenza Age 5 to Adult 8/1/2018 HEMOGLOBIN A1C Q6M 1/16/2019 LIPID PANEL Q1 7/16/2019 EYE EXAM RETINAL OR DILATED Q1 7/30/2019 MEDICARE YEARLY EXAM 10/16/2019 DTaP/Tdap/Td series (2 - Td) 9/9/2026 Allergies as of 10/15/2018  Review Complete On: 10/15/2018 By: DIAN Hayward Severity Noted Reaction Type Reactions Pcn [Penicillins]  03/05/2013    Other (comments) Unknown-happened when she was very young Current Immunizations  Reviewed on 10/19/2016 Name Date Influenza High Dose Vaccine PF 9/11/2017, 9/9/2016 Influenza Vaccine 12/29/2014, 12/26/2013 Influenza Vaccine (Tri) Adjuvanted  Incomplete Not reviewed this visit You Were Diagnosed With   
  
 Codes Comments Type 2 diabetes mellitus without complication, without long-term current use of insulin (HCC)    -  Primary ICD-10-CM: E11.9 ICD-9-CM: 250.00 Essential hypertension     ICD-10-CM: I10 
ICD-9-CM: 401.9  Gastroesophageal reflux disease, esophagitis presence not specified ICD-10-CM: K21.9 ICD-9-CM: 530.81 Dementia without behavioral disturbance, unspecified dementia type     ICD-10-CM: F03.90 ICD-9-CM: 294.20 Medicare annual wellness visit, subsequent     ICD-10-CM: Z00.00 ICD-9-CM: V70.0 Encounter for immunization     ICD-10-CM: H08 ICD-9-CM: V03.89 Vitals BP Pulse Temp Resp Height(growth percentile) SpO2  
 144/78 (BP 1 Location: Right arm, BP Patient Position: Sitting) 61 97.5 °F (36.4 °C) (Oral) 12 5' 7\" (1.702 m) 99% OB Status Smoking Status Postmenopausal Never Smoker Vitals History Preferred Pharmacy Pharmacy Name Phone [facility] [address] [facility] [address] [place] [address] [phone] Your Updated Medication List  
  
   
This list is accurate as of 10/15/18  1:44 PM.  Always use your most recent med list.  
  
  
  
  
 aspirin delayed-release 81 mg tablet  
take 1 tablet by mouth once daily  
  
 atorvastatin 80 mg tablet Commonly known as:  LIPITOR  
take 1 tablet by mouth once daily at bedtime CALCIUM 600 600 mg calcium (1,500 mg) tablet Generic drug:  calcium carbonate  
take 1 tablet by mouth twice a day  
  
 dorzolamide 2 % ophthalmic solution Commonly known as:  TRUSOPT Administer 2 Drops to both eyes three (3) times daily. ferrous sulfate 324 mg (65 mg iron) tablet  
take 1 tablet by mouth twice a day  
  
 hydrocortisone 1 % lotion Commonly known as:  ALA-DEEDEE Apply  to affected area two (2) times a day. use thin layer  
  
 latanoprost 0.005 % ophthalmic solution Commonly known as:  Bonnie Bodo Administer 1 Drop to both eyes nightly. lisinopril 5 mg tablet Commonly known as:  Alver Medora Take 1 Tab by mouth daily. memantine 10 mg tablet Commonly known as:  NAMENDA  
1 q am  
  
 multivitamin tablet Commonly known as:  DAILY MULTIPLE Take 1 Tab by mouth daily. raNITIdine 150 mg tablet Commonly known as:  ZANTAC TAKE 1 TABLET TWICE DAILY  
  
 SYSTANE (PROPYLENE GLYCOL) 0.4-0.3 % Drop Generic drug:  peg 400-propylene glycol Administer  to left eye as needed. TYLENOL EXTRA STRENGTH 500 mg tablet Generic drug:  acetaminophen Take  by mouth every six (6) hours as needed for Pain. Prescriptions Sent to Pharmacy Refills  
 lisinopril (PRINIVIL, ZESTRIL) 5 mg tablet 1 Sig: Take 1 Tab by mouth daily. Class: Normal  
 Pharmacy: [address] [place] [address], [address] [place] [address] #: [phone] Route: Oral  
  
We Performed the Following ADMIN INFLUENZA VIRUS VAC [ [facility]] INFLUENZA VACCINE INACTIVATED (IIV), SUBUNIT, ADJUVANTED, IM [id number] CPT(R)] Follow-up Instructions Return in about 6 months (around 4/15/2019) for routine care. Patient Instructions Medicare Wellness Visit, Female The best way to live healthy is to have a lifestyle where you eat a well-balanced diet, exercise regularly, limit alcohol use, and quit all forms of tobacco/nicotine, if applicable. Regular preventive services are another way to keep healthy. Preventive services (vaccines, screening tests, monitoring & exams) can help personalize your care plan, which helps you manage your own care. Screening tests can find health problems at the earliest stages, when they are easiest to treat. [facility] follows the current, evidence-based guidelines published by the [place] (USPSTF) when recommending preventive services for our patients. Because we follow these guidelines, sometimes recommendations change over time as research supports it. (For example, mammograms used to be recommended annually. Even though Medicare will still pay for an annual mammogram, the newer guidelines recommend a mammogram every two years for women of average risk.) Of course, you and your doctor may decide to screen more often for some diseases, based on your risk and your health status. Preventive services for you include: - Medicare offers their members a free annual wellness visit, which is time for you and your primary care provider to discuss and plan for your preventive service needs. Take advantage of this benefit every year! 
-All adults over the age of 72 should receive the recommended pneumonia vaccines. Current USPSTF guidelines recommend a series of two vaccines for the best pneumonia protection.  
-All adults should have a flu vaccine yearly and a tetanus vaccine every 10 years. All adults age 61 and older should receive a shingles vaccine once in their lifetime.   
-A bone mass density test is recommended when a woman turns 65 to screen for osteoporosis. This test is only recommended one time, as a screening. Some providers will use this same test as a disease monitoring tool if you already have osteoporosis. -All adults age 38-68 who are overweight should have a diabetes screening test once every three years.  
-Other screening tests and preventive services for persons with diabetes include: an eye exam to screen for diabetic retinopathy, a kidney function test, a foot exam, and stricter control over your cholesterol.  
-Cardiovascular screening for adults with routine risk involves an electrocardiogram (ECG) at intervals determined by your doctor.  
-Colorectal cancer screenings should be done for adults age 54-65 with no increased risk factors for colorectal cancer. There are a number of acceptable methods of screening for this type of cancer. Each test has its own benefits and drawbacks. Discuss with your doctor what is most appropriate for you during your annual wellness visit. The different tests include: colonoscopy (considered the best screening method), a fecal occult blood test, a fecal DNA test, and sigmoidoscopy. -Breast cancer screenings are recommended every other year for women of normal risk, age 54-69. -Cervical cancer screenings for women over age 72 are only recommended with certain risk factors.  
-All adults born between [facility] should be screened once for Hepatitis C. Here is a list of your current Health Maintenance items (your personalized list of preventive services) with a due date: 
Health Maintenance Due Topic Date Due  Shingles Vaccine (1 of 2) 02[birth year]1983 [address] [place] [address] Diabetic Foot Care  09/29/2015  Pneumococcal Vaccine (2 of 2 - PPSV23) 11/04/2016  Flu Vaccine  08/01/2018 [address] [place] [address] Annual Well Visit  09/12/2018 Vaccine Information Statement Influenza (Flu) Vaccine (Inactivated or Recombinant): What you need to know Many Vaccine Information Statements are available in Maori and other languages. See www.immunize.org/vis Hojas de Información Sobre Vacunas están disponibles en Español y en muchos otros idiomas. Visite www.immunize.org/vis 1. Why get vaccinated? Influenza (flu) is a contagious disease that spreads around the [facility] every year, usually between October and May. Flu is caused by influenza viruses, and is spread mainly by coughing, sneezing, and close contact. Anyone can get flu. Flu strikes suddenly and can last several days. Symptoms vary by age, but can include: 
 fever/chills  sore throat  muscle aches  fatigue  cough  headache  runny or stuffy nose Flu can also lead to pneumonia and blood infections, and cause diarrhea and seizures in children. If you have a medical condition, such as heart or lung disease, flu can make it worse. Flu is more dangerous for some people. Infants and young children, people 72years of age and older, pregnant women, and people with certain health conditions or a weakened immune system are at greatest risk. Each year thousands of people in the [facility] die from flu, and many more are hospitalized.   
 
Flu vaccine can: 
 keep you from getting flu, 
  make flu less severe if you do get it, and 
 keep you from spreading flu to your family and other people. 2. Inactivated and recombinant flu vaccines A dose of flu vaccine is recommended every flu season. Children 6 months through 6years of age may need two doses during the same flu season. Everyone else needs only one dose each flu season. Some inactivated flu vaccines contain a very small amount of a mercury-based preservative called thimerosal. Studies have not shown thimerosal in vaccines to be harmful, but flu vaccines that do not contain thimerosal are available. There is no live flu virus in flu shots. They cannot cause the flu. There are many flu viruses, and they are always changing. Each year a new flu vaccine is made to protect against three or four viruses that are likely to cause disease in the upcoming flu season. But even when the vaccine doesnt exactly match these viruses, it may still provide some protection Flu vaccine cannot prevent: 
 flu that is caused by a virus not covered by the vaccine, or 
 illnesses that look like flu but are not. It takes about 2 weeks for protection to develop after vaccination, and protection lasts through the flu season. 3. Some people should not get this vaccine Tell the person who is giving you the vaccine:  If you have any severe, life-threatening allergies. If you ever had a life-threatening allergic reaction after a dose of flu vaccine, or have a severe allergy to any part of this vaccine, you may be advised not to get vaccinated. Most, but not all, types of flu vaccine contain a small amount of egg protein.  If you ever had Guillain-Barré Syndrome (also called GBS). Some people with a history of GBS should not get this vaccine. This should be discussed with your doctor.  If you are not feeling well.    
It is usually okay to get flu vaccine when you have a mild illness, but you might be asked to come back when you feel better. 4. Risks of a vaccine reaction With any medicine, including vaccines, there is a chance of reactions. These are usually mild and go away on their own, but serious reactions are also possible. Most people who get a flu shot do not have any problems with it. Minor problems following a flu shot include:  
 soreness, redness, or swelling where the shot was given  hoarseness  sore, red or itchy eyes  cough  fever  aches  headache  itching  fatigue If these problems occur, they usually begin soon after the shot and last 1 or 2 days. More serious problems following a flu shot can include the following:  There may be a small increased risk of Guillain-Barré Syndrome (GBS) after inactivated flu vaccine. This risk has been estimated at 1 or 2 additional cases per million people vaccinated. This is much lower than the risk of severe complications from flu, which can be prevented by flu vaccine.  Young children who get the flu shot along with pneumococcal vaccine (PCV13) and/or DTaP vaccine at the same time might be slightly more likely to have a seizure caused by fever. Ask your doctor for more information. Tell your doctor if a child who is getting flu vaccine has ever had a seizure. Problems that could happen after any injected vaccine:  People sometimes faint after a medical procedure, including vaccination. Sitting or lying down for about 15 minutes can help prevent fainting, and injuries caused by a fall. Tell your doctor if you feel dizzy, or have vision changes or ringing in the ears.  Some people get severe pain in the shoulder and have difficulty moving the arm where a shot was given. This happens very rarely.  Any medication can cause a severe allergic reaction.  Such reactions from a vaccine are very rare, estimated at about 1 in a million doses, and would happen within a few minutes to a few hours after the vaccination. As with any medicine, there is a very remote chance of a vaccine causing a serious injury or death. The safety of vaccines is always being monitored. For more information, visit: www.cdc.gov/vaccinesafety/ 
 
5. What if there is a serious reaction? What should I look for?  Look for anything that concerns you, such as signs of a severe allergic reaction, very high fever, or unusual behavior. Signs of a severe allergic reaction can include hives, swelling of the face and throat, difficulty breathing, a fast heartbeat, dizziness, and weakness  usually within a few minutes to a few hours after the vaccination. What should I do?  If you think it is a severe allergic reaction or other emergency that cant wait, call 9-1-1 and get the person to the nearest hospital. Otherwise, call your doctor.  Reactions should be reported to the Vaccine Adverse Event Reporting System (VAERS). Your doctor should file this report, or you can do it yourself through  the VAERS web site at www.vaers. [facility].gov, or by calling 0-300.841.7415. VAERS does not give medical advice. 6. The National Vaccine Injury Compensation Program 
 
The [facility] Vaccine Injury Compensation Program (VICP) is a federal program that was created to compensate people who may have been injured by certain vaccines. Persons who believe they may have been injured by a vaccine can learn about the program and about filing a claim by calling 1-571.529.8498 or visiting the [employer] website at www.[facility].gov/vaccinecompensation. There is a time limit to file a claim for compensation. 7. How can I learn more?  Ask your healthcare provider. He or she can give you the vaccine package insert or suggest other sources of information.  Call your local or state health department.  
 Contact the Centers for Disease Control and Prevention (CDC): 
 - Call 1-420.995.8016 (1-800-CDC-INFO) or 
- Visit CDCs website at www.cdc.gov/flu Vaccine Information Statement Inactivated Influenza Vaccine 8/7/2015 
42 GISELA Ayon 131CG-12 Department of [facility] [employer] Centers for Disease Control and Prevention Office Use Only A Healthy Lifestyle: Care Instructions Your Care Instructions A healthy lifestyle can help you feel good, stay at a healthy weight, and have plenty of energy for both work and play. A healthy lifestyle is something you can share with your whole family. A healthy lifestyle also can lower your risk for serious health problems, such as high blood pressure, heart disease, and diabetes. You can follow a few steps listed below to improve your health and the health of your family. Follow-up care is a key part of your treatment and safety. Be sure to make and go to all appointments, and call your doctor if you are having problems. It's also a good idea to know your test results and keep a list of the medicines you take. How can you care for yourself at home? · Do not eat too much sugar, fat, or fast foods. You can still have dessert and treats now and then. The goal is moderation. · Start small to improve your eating habits. Pay attention to portion sizes, drink less juice and soda pop, and eat more fruits and vegetables. ¨ Eat a healthy amount of food. A 3-ounce serving of meat, for example, is about the size of a deck of cards. Fill the rest of your plate with vegetables and whole grains. ¨ Limit the amount of soda and sports drinks you have every day. Drink more water when you are thirsty. ¨ Eat at least 5 servings of fruits and vegetables every day. It may seem like a lot, but it is not hard to reach this goal. A serving or helping is 1 piece of fruit, 1 cup of vegetables, or 2 cups of leafy, raw vegetables.  Have an apple or some carrot sticks as an afternoon snack instead of a candy bar. Try to have fruits and/or vegetables at every meal. 
· Make exercise part of your daily routine. You may want to start with simple activities, such as walking, bicycling, or slow swimming. Try to be active 30 to 60 minutes every day. You do not need to do all 30 to 60 minutes all at once. For example, you can exercise 3 times a day for 10 or 20 minutes. Moderate exercise is safe for most people, but it is always a good idea to talk to your doctor before starting an exercise program. 
· Keep moving. Marbin Dryer the lawn, work in the garden, or play140. Take the stairs instead of the elevator at work. · If you smoke, quit. People who smoke have an increased risk for heart attack, stroke, cancer, and other lung illnesses. Quitting is hard, but there are ways to boost your chance of quitting tobacco for good. ¨ Use nicotine gum, patches, or lozenges. ¨ Ask your doctor about stop-smoking programs and medicines. ¨ Keep trying. In addition to reducing your risk of diseases in the future, you will notice some benefits soon after you stop using tobacco. If you have shortness of breath or asthma symptoms, they will likely get better within a few weeks after you quit. · Limit how much alcohol you drink. Moderate amounts of alcohol (up to 2 drinks a day for men, 1 drink a day for women) are okay. But drinking too much can lead to liver problems, high blood pressure, and other health problems. Family health If you have a family, there are many things you can do together to improve your health. · Eat meals together as a family as often as possible. · Eat healthy foods. This includes fruits, vegetables, lean meats and dairy, and whole grains. · Include your family in your fitness plan. Most people think of activities such as jogging or tennis as the way to fitness, but there are many ways you and your family can be more active.  Anything that makes you breathe hard and gets your heart pumping is exercise. Here are some tips: 
¨ Walk to do errands or to take your child to school or the bus. ¨ Go for a family bike ride after dinner instead of watching TV. Where can you learn more? Go to http://michell-everardo.info/. Enter D074 in the search box to learn more about \"A Healthy Lifestyle: Care Instructions. \" Current as of: December 7, 2017 Content Version: 11.8 © 1072-7262 [employer]. Care instructions adapted under license by [employer]) (which disclaims liability or warranty for this information). If you have questions about a medical condition or this instruction, always ask your healthcare professional. [address] any warranty or liability for your use of this information. Introducing [facility]! Dear Mikayla Levin: 
Thank you for requesting a [employer] account. Our records indicate that you already have an active [employer] account. You can access your account anytime at [email or web address] Did you know that you can access your hospital and ER discharge instructions at any time in [employer]? You can also review all of your test results from your hospital stay or ER visit. Additional Information If you have questions, please visit the Frequently Asked Questions section of the [employer] website at [email or web address] Remember, [employer] is NOT to be used for urgent needs. For medical emergencies, dial 911. Now available from your iPhone and Android! Please provide this summary of care documentation to your next provider. Your primary care clinician is listed as Saman Alamo. If you have any questions after today's visit, please call [phone].

## 2018-10-15 NOTE — PROGRESS NOTES
This is the Subsequent Medicare Annual Wellness Exam, performed 12 months or more after the Initial AWV or the last Subsequent AWV I have reviewed the patient's medical history in detail and updated the computerized patient record. History Past Medical History:  
Diagnosis Date  ACS (acute coronary syndrome) ([facility] [address] NSTEMI  Arthritis  Breast cancer [facility]) 2007  
 right breast (radiation)  Dementia  Diabetes ([facility] [address] diet controlled  GERD (gastroesophageal reflux disease)  Glaucoma  Hypertension Past Surgical History:  
Procedure Laterality Date  HX APPENDECTOMY  HX BREAST BIOPSY  HX CHOLECYSTECTOMY Current Outpatient Prescriptions Medication Sig Dispense Refill  atorvastatin (LIPITOR) 80 mg tablet take 1 tablet by mouth once daily at bedtime 90 Tab 1  raNITIdine (ZANTAC) 150 mg tablet TAKE 1 TABLET TWICE DAILY 180 Tab 0  
 lisinopril (PRINIVIL, ZESTRIL) 10 mg tablet TAKE 1/2 TABLET ONE TIME DAILY 45 Tab 0  
 memantine (NAMENDA) 10 mg tablet 1 q am 90 Tab 4  
 hydrocortisone (ALA-DEEDEE) 1 % lotion Apply  to affected area two (2) times a day. use thin layer 60 g 2  
 ferrous sulfate 324 mg (65 mg iron) tablet take 1 tablet by mouth twice a day 60 Tab 1  
 CALCIUM 600 600 mg calcium (1,500 mg) tablet take 1 tablet by mouth twice a day 60 Tab 6  
 acetaminophen (TYLENOL EXTRA STRENGTH) 500 mg tablet Take  by mouth every six (6) hours as needed for Pain.  dorzolamide (TRUSOPT) 2 % ophthalmic solution Administer 2 Drops to both eyes three (3) times daily.  aspirin delayed-release 81 mg tablet take 1 tablet by mouth once daily 30 Tab 6  
 latanoprost (XALATAN) 0.005 % ophthalmic solution Administer 1 Drop to both eyes nightly.  multivitamin (DAILY MULTIPLE) tablet Take 1 Tab by mouth daily. 30 Tab 6  peg 400-propylene glycol (SYSTANE) 0.4-0.3 % drop Administer  to left eye as needed. Allergies Allergen Reactions  Pcn [Penicillins] Other (comments) Unknown-happened when she was very young Family History Problem Relation Age of Onset  Cancer Mother  Cancer Father  Diabetes Sister  Diabetes Paternal Grandmother  Cancer Other Social History Substance Use Topics  Smoking status: Never Smoker  Smokeless tobacco: Never Used  Alcohol use No  
 
Patient Active Problem List  
Diagnosis Code  
 HTN (hypertension) I10  
 Diabetes mellitus ([facility] [address] E11.9  GERD (gastroesophageal reflux disease) K21.9  Frozen shoulder M75.00  
 History of breast cancer Z85.3  
 OA (osteoarthritis) of knee M17.10  
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  
 Tear of rotator cuff M75.100  Memory loss R41.3  Dementia associated with other underlying disease F02.80  Advance directive discussed with patient Z70.80 Depression Risk Factor Screening: PHQ over the last two weeks 10/15/2018 PHQ Not Done - Little interest or pleasure in doing things Not at all Feeling down, depressed, irritable, or hopeless Several days Total Score PHQ 2 1 Alcohol Risk Factor Screening:  
Patient does not drink alcohol. Functional Ability and Level of Safety:  
Hearing Loss Mild Hearing Loss 
  
Activities of Daily Living The home contains: no safety equipment Patient needs help with:  phone, transportation, shopping, preparing meals, laundry, housework, managing medications, managing money, dressing, bathing, hygiene and bathroom needs 
  
 
Fall Risk Fall Risk Assessment, last 12 mths 10/15/2018 Able to walk? No  
Fall in past 12 months? No  
Fall with injury? -  
Number of falls in past 12 months - Fall Risk Score -  
 
 
Abuse Screen No red flags for abuse. Cognitive Screening Evaluation of Cognitive Function: 
Has your family/caregiver stated any concerns about your memory: Patient has known dementia. Patient Care Team  
Patient Care Team: DIAN Ricardo as PCP - General (Physician Assistant) Arianna Page MD as Consulting Provider (Neurology) Kayli Carvalho MD (Cardiology) Vidhi Saldana MD (Ophthalmology) Stephenie Obrien MD (Ophthalmology) Assessment/Plan Education and counseling provided: 
Are appropriate based on today's review and evaluation End-of-Life planning (with patient's consent) Pneumococcal Vaccine Influenza Vaccine Diagnoses and all orders for this visit: 
 
1. Medicare annual wellness visit, subsequent - Medicare wellness visit completed - Immunizations updated 
- Health screenings reviewed 2. Encounter for immunization - Flu and pneumococcal vaccines administered today with daughter's consent Orders: -     Influenza Vaccine Inactivated (IIV)(FLUAD), Subunit, Adjuvanted, IM, (59487) -     Administration fee () for Medicare insured patients Health Maintenance Due Topic Date Due  Shingrix Vaccine Age 50> (1 of 2) 02/03/1983  
 FOOT EXAM Q1  09/29/2015  Pneumococcal 65+ High/Highest Risk (2 of 2 - PPSV23) 11/04/2016  Influenza Age 5 to Adult  08/01/2018 DIAN Ricardo  
10/15/2018  
1:45 PM

## 2018-10-15 NOTE — PROGRESS NOTES
Patient: Raisa Wellington MRN: [id number]  SSN: [id number] YOB: 1933  Age: 80 y.o. Sex: female Date of Service: 10/15/2018 Provider: DIAN Baron Office Location:  
[address] [address], [address] [place], [address] Office Phone: [phone] Office Fax: [phone] REASON FOR VISIT:  
Chief Complaint Patient presents with Julienne Slimmer Annual Wellness Visit  VITALS:  
Visit Vitals  /78 (BP 1 Location: Right arm, BP Patient Position: Sitting) Comment: manual  
 Pulse 61  Temp 97.5 °F (36.4 °C) (Oral)  Resp 12  Ht 5' 7\" (1.702 m)  SpO2 99% MEDICATIONS:  
Current Outpatient Prescriptions on File Prior to Visit Medication Sig Dispense Refill  raNITIdine (ZANTAC) 150 mg tablet TAKE 1 TABLET TWICE DAILY 180 Tab 0  
 lisinopril (PRINIVIL, ZESTRIL) 10 mg tablet TAKE 1/2 TABLET ONE TIME DAILY 45 Tab 0  
 memantine (NAMENDA) 10 mg tablet 1 q am 90 Tab 4  
 hydrocortisone (ALA-DEEDEE) 1 % lotion Apply  to affected area two (2) times a day. use thin layer 60 g 2  
 ferrous sulfate 324 mg (65 mg iron) tablet take 1 tablet by mouth twice a day 60 Tab 1  
 CALCIUM 600 600 mg calcium (1,500 mg) tablet take 1 tablet by mouth twice a day 60 Tab 6  
 acetaminophen (TYLENOL EXTRA STRENGTH) 500 mg tablet Take  by mouth every six (6) hours as needed for Pain.  dorzolamide (TRUSOPT) 2 % ophthalmic solution Administer 2 Drops to both eyes three (3) times daily.  aspirin delayed-release 81 mg tablet take 1 tablet by mouth once daily 30 Tab 6  
 latanoprost (XALATAN) 0.005 % ophthalmic solution Administer 1 Drop to both eyes nightly.  multivitamin (DAILY MULTIPLE) tablet Take 1 Tab by mouth daily. 30 Tab 6  peg 400-propylene glycol (SYSTANE) 0.4-0.3 % drop Administer  to left eye as needed. No current facility-administered medications on file prior to visit.    
  
 
ALLERGIES:  
 Allergies Allergen Reactions  Pcn [Penicillins] Other (comments) Unknown-happened when she was very young MEDICAL/SURGICAL HISTORY: 
Past Medical History:  
Diagnosis Date  ACS (acute coronary syndrome) ([facility] [address] NSTEMI  Arthritis  Breast cancer [facility]) 2007  
 right breast (radiation)  Dementia  Diabetes ([facility] [address] diet controlled  GERD (gastroesophageal reflux disease)  Glaucoma  Hypertension Past Surgical History:  
Procedure Laterality Date  HX APPENDECTOMY  HX BREAST BIOPSY  HX CHOLECYSTECTOMY FAMILY HISTORY: 
Family History Problem Relation Age of Onset  Cancer Mother  Cancer Father  Diabetes Sister  Diabetes Paternal Grandmother  Cancer Other SOCIAL HISTORY: 
Social History Substance Use Topics  Smoking status: Never Smoker  Smokeless tobacco: Never Used  Alcohol use No  
   
 
 
 
HISTORY OF PRESENT ILLNESS: Colin Gómez is a 80 y.o. female who presents to the office for a routine follow up visit. Diabetes - 
Currently the patient's condition is well controlled on diet alone Last A1c: 5.0% on 7/16/18 She is no longer checking her blood sugars at home. Last urine microalbumin: overdue, but patient is not able to do screening due to dementia. She is on an ACE inhibitor for renal protection. Last lipid panel: 10/25/16, LDL 65 at that time. Patient is on statin therapy 
  
Glaucoma - Patient is followed closely by ophthalmology - Dr. Colletta Collin and Dr. Alexandra Aranda. No concerns today.  
  
Hypertension -  
Currently, the patient's condition is stable. BP at goal for age and functional status. Reports compliance with the following regimen: lisinopril 5 mg daily. This was decreased from 10 mg daily within the past year due to issues with hypotension Dementia - On Nam[place] Followed by neurology No acute complaints or concerns today per daughter  
  
GERD -  
 Stable, well controlled on Zantac, though will experience breakthrough symptoms if she skips a dose.  
  
Insomnia -  
Daughter reports that patient has been having some trouble sleeping lately. A nurse that comes to the house from her insurance company suggested a trial of trazodone. REVIEW OF SYSTEMS: 
Review of Systems Constitutional: Negative for chills, fever and malaise/fatigue. Respiratory: Negative for cough and wheezing. Cardiovascular: Negative for chest pain and leg swelling. Gastrointestinal: Positive for heartburn. Negative for abdominal pain, blood in stool, constipation, diarrhea, nausea and vomiting. Psychiatric/Behavioral: The patient has insomnia. PHYSICAL EXAMINATION: 
Physical Exam  
Constitutional: She is well-developed, well-nourished, and in no distress. Cardiovascular: Normal rate, regular rhythm and normal heart sounds. Exam reveals no gallop and no friction rub. No murmur heard. Pulmonary/Chest: Effort normal and breath sounds normal. She has no wheezes. She has no rales. Neurological: She is alert. Skin: Skin is warm and dry. No rash noted. Psychiatric: Mood, memory and affect normal.  
  
 
RESULTS: 
No results found for this visit on 10/15/18. ASSESSMENT/PLAN: 
Diagnoses and all orders for this visit: 
 
1. Type 2 diabetes mellitus without complication, without long-term current use of insulin ([address] - Well controlled on diet alone - Will continue to monitor 2. Essential hypertension - BP at goal  
- Continue lisinopril 5 mg daily Orders:   
-     lisinopril (PRINIVIL, ZESTRIL) 5 mg tablet; Take 1 Tab by mouth daily. 3. Gastroesophageal reflux disease, esophagitis presence not specified 
- Stable on Zantac 4. Dementia without behavioral disturbance, unspecified dementia type - Encouraged follow up with neurology, due next month 5. Insomnia 
- Discussed risks/benefits of trazodone.  Patient's daughter will call me if she wishes to try it. Follow-up Disposition: Follow up in 6 months (around 4/15/19) for routine care. All questions answered. Patient expresses understanding and agrees with the plan as detailed above. PATIENT CARE TEAM:  
Patient Care Team: 
DIAN Bradshaw as PCP - General (Physician Assistant) Esperanza Kawasaki, MD as Consulting Provider (Neurology) Fernando Marcelino MD (Cardiology) Omid Snyder MD (Ophthalmology) Marizol Maldonado MD (Ophthalmology) DIAN Bradshaw October 15, 2018  
1:44 PM

## 2018-10-15 NOTE — PATIENT INSTRUCTIONS
Medicare Wellness Visit, Female The best way to live healthy is to have a lifestyle where you eat a well-balanced diet, exercise regularly, limit alcohol use, and quit all forms of tobacco/nicotine, if applicable. Regular preventive services are another way to keep healthy. Preventive services (vaccines, screening tests, monitoring & exams) can help personalize your care plan, which helps you manage your own care. Screening tests can find health problems at the earliest stages, when they are easiest to treat. [facility] follows the current, evidence-based guidelines published by the [facility] ([facility]) when recommending preventive services for our patients. Because we follow these guidelines, sometimes recommendations change over time as research supports it. (For example, mammograms used to be recommended annually. Even though Medicare will still pay for an annual mammogram, the newer guidelines recommend a mammogram every two years for women of average risk.) Of course, you and your doctor may decide to screen more often for some diseases, based on your risk and your health status. Preventive services for you include: - Medicare offers their members a free annual wellness visit, which is time for you and your primary care provider to discuss and plan for your preventive service needs. Take advantage of this benefit every year! 
-All adults over the age of 72 should receive the recommended pneumonia vaccines. Current USPSTF guidelines recommend a series of two vaccines for the best pneumonia protection.  
-All adults should have a flu vaccine yearly and a tetanus vaccine every 10 years. All adults age 61 and older should receive a shingles vaccine once in their lifetime.   
-A bone mass density test is recommended when a woman turns 65 to screen for osteoporosis. This test is only recommended one time, as a screening. Some providers will use this same test as a disease monitoring tool if you already have osteoporosis. -All adults age 38-68 who are overweight should have a diabetes screening test once every three years.  
-Other screening tests and preventive services for persons with diabetes include: an eye exam to screen for diabetic retinopathy, a kidney function test, a foot exam, and stricter control over your cholesterol.  
-Cardiovascular screening for adults with routine risk involves an electrocardiogram (ECG) at intervals determined by your doctor.  
-Colorectal cancer screenings should be done for adults age 54-65 with no increased risk factors for colorectal cancer. There are a number of acceptable methods of screening for this type of cancer. Each test has its own benefits and drawbacks. Discuss with your doctor what is most appropriate for you during your annual wellness visit. The different tests include: colonoscopy (considered the best screening method), a fecal occult blood test, a fecal DNA test, and sigmoidoscopy. -Breast cancer screenings are recommended every other year for women of normal risk, age 54-69. 
-Cervical cancer screenings for women over age 72 are only recommended with certain risk factors.  
-All adults born between [facility] should be screened once for Hepatitis C. Here is a list of your current Health Maintenance items (your personalized list of preventive services) with a due date: 
Health Maintenance Due Topic Date Due  Shingles Vaccine (1 of 2) 02/03/1983 [facility]  09/29/2015  Pneumococcal Vaccine (2 of 2 - PPSV23) 11/04/2016  Flu Vaccine  08/01/2018 Sally Blanc Annual Well Visit  09/12/2018 Vaccine Information Statement Influenza (Flu) Vaccine (Inactivated or Recombinant): What you need to know Many Vaccine Information Statements are available in Faroese and other languages. See www.immunize.org/vis Hojas de Información Sobre Vacunas están disponibles en Español y en muchos otros idiomas. Visite www.immunize.org/vis 1. Why get vaccinated? Influenza (flu) is a contagious disease that spreads around the [place] every year, usually between October and May. Flu is caused by influenza viruses, and is spread mainly by coughing, sneezing, and close contact. Anyone can get flu. Flu strikes suddenly and can last several days. Symptoms vary by age, but can include: 
 fever/chills  sore throat  muscle aches  fatigue  cough  headache  runny or stuffy nose Flu can also lead to pneumonia and blood infections, and cause diarrhea and seizures in children. If you have a medical condition, such as heart or lung disease, flu can make it worse. Flu is more dangerous for some people. Infants and young children, people 72years of age and older, pregnant women, and people with certain health conditions or a weakened immune system are at greatest risk. Each year thousands of people in the [facility] die from flu, and many more are hospitalized. Flu vaccine can: 
 keep you from getting flu, 
 make flu less severe if you do get it, and 
 keep you from spreading flu to your family and other people. 2. Inactivated and recombinant flu vaccines A dose of flu vaccine is recommended every flu season. Children 6 months through 6years of age may need two doses during the same flu season. Everyone else needs only one dose each flu season. Some inactivated flu vaccines contain a very small amount of a mercury-based preservative called thimerosal. Studies have not shown thimerosal in vaccines to be harmful, but flu vaccines that do not contain thimerosal are available. There is no live flu virus in flu shots. They cannot cause the flu. There are many flu viruses, and they are always changing.  Each year a new flu vaccine is made to protect against three or four viruses that are likely to cause disease in the upcoming flu season. But even when the vaccine doesnt exactly match these viruses, it may still provide some protection Flu vaccine cannot prevent: 
 flu that is caused by a virus not covered by the vaccine, or 
 illnesses that look like flu but are not. It takes about 2 weeks for protection to develop after vaccination, and protection lasts through the flu season. 3. Some people should not get this vaccine Tell the person who is giving you the vaccine:  If you have any severe, life-threatening allergies. If you ever had a life-threatening allergic reaction after a dose of flu vaccine, or have a severe allergy to any part of this vaccine, you may be advised not to get vaccinated. Most, but not all, types of flu vaccine contain a small amount of egg protein.  If you ever had Guillain-Barré Syndrome (also called GBS). Some people with a history of GBS should not get this vaccine. This should be discussed with your doctor.  If you are not feeling well. It is usually okay to get flu vaccine when you have a mild illness, but you might be asked to come back when you feel better. 4. Risks of a vaccine reaction With any medicine, including vaccines, there is a chance of reactions. These are usually mild and go away on their own, but serious reactions are also possible. Most people who get a flu shot do not have any problems with it. Minor problems following a flu shot include:  
 soreness, redness, or swelling where the shot was given  hoarseness  sore, red or itchy eyes  cough  fever  aches  headache  itching  fatigue If these problems occur, they usually begin soon after the shot and last 1 or 2 days. More serious problems following a flu shot can include the following:  There may be a small increased risk of Guillain-Barré Syndrome (GBS) after inactivated flu vaccine. This risk has been estimated at 1 or 2 additional cases per million people vaccinated. This is much lower than the risk of severe complications from flu, which can be prevented by flu vaccine.  Young children who get the flu shot along with pneumococcal vaccine (PCV13) and/or DTaP vaccine at the same time might be slightly more likely to have a seizure caused by fever. Ask your doctor for more information. Tell your doctor if a child who is getting flu vaccine has ever had a seizure. Problems that could happen after any injected vaccine:  People sometimes faint after a medical procedure, including vaccination. Sitting or lying down for about 15 minutes can help prevent fainting, and injuries caused by a fall. Tell your doctor if you feel dizzy, or have vision changes or ringing in the ears.  Some people get severe pain in the shoulder and have difficulty moving the arm where a shot was given. This happens very rarely.  Any medication can cause a severe allergic reaction. Such reactions from a vaccine are very rare, estimated at about 1 in a million doses, and would happen within a few minutes to a few hours after the vaccination. As with any medicine, there is a very remote chance of a vaccine causing a serious injury or death. The safety of vaccines is always being monitored. For more information, visit: www.cdc.gov/vaccinesafety/ 
 
5. What if there is a serious reaction? What should I look for?  Look for anything that concerns you, such as signs of a severe allergic reaction, very high fever, or unusual behavior. Signs of a severe allergic reaction can include hives, swelling of the face and throat, difficulty breathing, a fast heartbeat, dizziness, and weakness  usually within a few minutes to a few hours after the vaccination. What should I do?  
 
 If you think it is a severe allergic reaction or other emergency that cant wait, call 9-1-1 and get the person to the nearest hospital. Otherwise, call your doctor.  Reactions should be reported to the Vaccine Adverse Event Reporting System (VAERS). Your doctor should file this report, or you can do it yourself through  the VAERS web site at www.vaers. [facility].gov, or by calling 5-249.667.1676. VAERS does not give medical advice. 6. The National Vaccine Injury Compensation Program 
 
The [facility] Vaccine Injury Compensation Program (VICP) is a federal program that was created to compensate people who may have been injured by certain vaccines. Persons who believe they may have been injured by a vaccine can learn about the program and about filing a claim by calling 4-164.862.2287 or visiting the [employer] website at www.[email or web address].gov/[email or web address]. There is a time limit to file a claim for compensation. 7. How can I learn more?  Ask your healthcare provider. He or she can give you the vaccine package insert or suggest other sources of information.  Call your local or state health department.  Contact the Centers for Disease Control and Prevention (CDC): 
- Call 7-295.428.8690 (1-800-CDC-INFO) or 
- Visit CDCs website at www.cdc.gov/flu Vaccine Information Statement Inactivated Influenza Vaccine 8/7/2015 
[address] Cheryle Pauling [id number] [facility] Centers for Disease Control and Prevention Office Use Only A Healthy Lifestyle: Care Instructions Your Care Instructions A healthy lifestyle can help you feel good, stay at a healthy weight, and have plenty of energy for both work and play. A healthy lifestyle is something you can share with your whole family. A healthy lifestyle also can lower your risk for serious health problems, such as high blood pressure, heart disease, and diabetes. You can follow a few steps listed below to improve your health and the health of your family. Follow-up care is a key part of your treatment and safety. Be sure to make and go to all appointments, and call your doctor if you are having problems. It's also a good idea to know your test results and keep a list of the medicines you take. How can you care for yourself at home? · Do not eat too much sugar, fat, or fast foods. You can still have dessert and treats now and then. The goal is moderation. · Start small to improve your eating habits. Pay attention to portion sizes, drink less juice and soda pop, and eat more fruits and vegetables. ¨ Eat a healthy amount of food. A 3-ounce serving of meat, for example, is about the size of a deck of cards. Fill the rest of your plate with vegetables and whole grains. ¨ Limit the amount of soda and sports drinks you have every day. Drink more water when you are thirsty. ¨ Eat at least 5 servings of fruits and vegetables every day. It may seem like a lot, but it is not hard to reach this goal. A serving or helping is 1 piece of fruit, 1 cup of vegetables, or 2 cups of leafy, raw vegetables. Have an apple or some carrot sticks as an afternoon snack instead of a candy bar. Try to have fruits and/or vegetables at every meal. 
· Make exercise part of your daily routine. You may want to start with simple activities, such as walking, bicycling, or slow swimming. Try to be active 30 to 60 minutes every day. You do not need to do all 30 to 60 minutes all at once. For example, you can exercise 3 times a day for 10 or 20 minutes. Moderate exercise is safe for most people, but it is always a good idea to talk to your doctor before starting an exercise program. 
· Keep moving. Rika Rickers the lawn, work in the garden, or [employer]. Take the stairs instead of the elevator at work. · If you smoke, quit. People who smoke have an increased risk for heart attack, stroke, cancer, and other lung illnesses.  Quitting is hard, but there are ways to boost your chance of quitting tobacco for good. ¨ Use nicotine gum, patches, or lozenges. ¨ Ask your doctor about stop-smoking programs and medicines. ¨ Keep trying. In addition to reducing your risk of diseases in the future, you will notice some benefits soon after you stop using tobacco. If you have shortness of breath or asthma symptoms, they will likely get better within a few weeks after you quit. · Limit how much alcohol you drink. Moderate amounts of alcohol (up to 2 drinks a day for men, 1 drink a day for women) are okay. But drinking too much can lead to liver problems, high blood pressure, and other health problems. Family health If you have a family, there are many things you can do together to improve your health. · Eat meals together as a family as often as possible. · Eat healthy foods. This includes fruits, vegetables, lean meats and dairy, and whole grains. · Include your family in your fitness plan. Most people think of activities such as jogging or tennis as the way to fitness, but there are many ways you and your family can be more active. Anything that makes you breathe hard and gets your heart pumping is exercise. Here are some tips: 
¨ Walk to do errands or to take your child to school or the bus. ¨ Go for a family bike ride after dinner instead of watching TV. Where can you learn more? Go to http://michell-everardo.info/. Enter S125 in the search box to learn more about \"A Healthy Lifestyle: Care Instructions. \" Current as of: December 7, 2017 Content Version: 11.8 © 8311-3827 [employer]. Care instructions adapted under license by [employer] (which disclaims liability or warranty for this information). If you have questions about a medical condition or this instruction, always ask your healthcare professional. [address] any warranty or liability for your use of this information. Vaccine Information Statement Pneumococcal Conjugate Vaccine (PCV13): What You Need to Know Many Vaccine Information Statements are available in [place] and other languages. See www.immunize.org/vis. Hojas de información Sobre Vacunas están disponibles en español y en muchos otros idiomas. Visite www.immunize.org/vis. 1. Why get vaccinated? Vaccination can protect both children and adults from pneumococcal disease. Pneumococcal disease is caused by bacteria that can spread from person to person through close contact. It can cause ear infections, and it can also lead to more serious infections of the: 
 Lungs (pneumonia),  Blood (bacteremia), and 
 Covering of the brain and spinal cord (meningitis). Pneumococcal pneumonia is most common among adults. Pneumococcal meningitis can cause deafness and brain damage, and it kills about 1 child in 10 who get it. Anyone can get pneumococcal disease, but children under 3years of age and adults 72 years and older, people with certain medical conditions, and cigarette smokers are at the highest risk. Before there was a vaccine, the [facility] saw: 
 more than 700 cases of meningitis, 
 about 13,000 blood infections, 
 about 5 million ear infections, and 
 about 200 deaths 
in children under 5 each year from pneumococcal disease. Since vaccine became available, severe pneumococcal disease in these children has fallen by 88%. About 18,000 older adults die of pneumococcal disease each year in the [place]. Treatment of pneumococcal infections with penicillin and other drugs is not as effective as it used to be, because some strains of the disease have become resistant to these drugs. This makes prevention of the disease, through vaccination, even more important. 2. PCV13 vaccine Pneumococcal conjugate vaccine (called PCV13) protects against 13 types of pneumococcal bacteria. PCV13 is routinely given to children at 2, 4, 6, and 1515 months of age. It is also recommended for children and adults 3to 59years of age with certain health conditions, and for all adults 72years of age and older. Your doctor can give you details. 3. Some people should not get this vaccine Anyone who has ever had a life-threatening allergic reaction to a dose of this vaccine, to an earlier pneumococcal vaccine called PCV7, or to any vaccine containing diphtheria toxoid (for example, DTaP), should not get PCV13. Anyone with a severe allergy to any component of PCV13 should not get the vaccine. Tell your doctor if the person being vaccinated has any severe allergies. If the person scheduled for vaccination is not feeling well, your healthcare provider might decide to reschedule the shot on another day. 4. Risks of a vaccine reaction With any medicine, including vaccines, there is a chance of reactions. These are usually mild and go away on their own, but serious reactions are also possible. Problems reported following PCV13 varied by age and dose in the series. The most common problems reported among children were:  About half became drowsy after the shot, had a temporary loss of appetite, or had redness or tenderness where the shot was given.  About 1 out of 3 had swelling where the shot was given.  About 1 out of 3 had a mild fever, and about 1 in 20 had a fever over 102.2°F. 
 Up to about 8 out of 10 became fussy or irritable. Adults have reported pain, redness, and swelling where the shot was given; also mild fever, fatigue, headache, chills, or muscle pain. [place] Searing children who get PCV13 along with inactivated flu vaccine at the same time may be at increased risk for seizures caused by fever. Ask your doctor for more information. Problems that could happen after any vaccine:  People sometimes faint after a medical procedure, including vaccination. Sitting or lying down for about 15 minutes can help prevent fainting, and injuries caused by a fall. Tell your doctor if you feel dizzy, or have vision changes or ringing in the ears.  Some older children and adults get severe pain in the shoulder and have difficulty moving the arm where a shot was given. This happens very rarely.  Any medication can cause a severe allergic reaction. Such reactions from a vaccine are very rare, estimated at about 1 in a million doses, and would happen within a few minutes to a few hours after the vaccination. As with any medicine, there is a very small chance of a vaccine causing a serious injury or death. The safety of vaccines is always being monitored. For more information, visit: www.cdc.gov/vaccinesafety/  
 
5. What if there is a serious reaction? What should I look for?  Look for anything that concerns you, such as signs of a severe allergic reaction, very high fever, or unusual behavior. Signs of a severe allergic reaction can include hives, swelling of the face and throat, difficulty breathing, a fast heartbeat, dizziness, and weakness  usually within a few minutes to a few hours after the vaccination. What should I do?  If you think it is a severe allergic reaction or other emergency that cant wait, call 9-1-1 or get the person to the nearest hospital. Otherwise, call your doctor. Reactions should be reported to the Vaccine Adverse Event Reporting System (VAERS). Your doctor should file this report, or you can do it yourself through the VAERS web site at www.vaers. hhs.gov, or by calling 5-726.303.1496. VAERS does not give medical advice. 6. The National Vaccine Injury Compensation Program 
 
The [facility] Vaccine Injury Compensation Program (VICP) is a federal program that was created to compensate people who may have been injured by certain vaccines.  
 
Persons who believe they may have been injured by a vaccine can learn about the program and about filing a claim by calling 5-587.447.8722 or visiting the [employer] website at [email or web address].gov/[email or web address]. There is a time limit to file a claim for compensation. 7. How can I learn more?  Ask your healthcare provider. He or she can give you the vaccine package insert or suggest other sources of information.  Call your local or state health department.  Contact the Centers for Disease Control and Prevention (CDC): 
- Call 7-794.337.6913 (1-800-CDC-INFO) or 
- Visit CDCs website at www.cdc.gov/vaccines Vaccine Information Statement PCV13 Vaccine 11/5/2015  
42 [facility] [id number] Department of Health and [employer] Centers for Disease Control and Prevention Office Use Only

## 2018-10-15 NOTE — PROGRESS NOTES
Chief Complaint Patient presents with [facility] Annual Wellness Visit  Patient is not fasting. Patient in room # 5.  
 
1. Have you been to the ER, urgent care clinic since your last visit? Hospitalized since your last visit? No 
 
2. Have you seen or consulted any other health care providers outside of the [address] [place] [address] since your last visit? Include any pap smears or colon screening. No 
 
HM Reviewed. Flu injection due Flowsheet, Learning needs, PHQ, fall, ADL and 6cit completed. 6cit patient unable to be complete and became emotional. 
PHQ over the last two weeks 10/15/2018 PHQ Not Done - Little interest or pleasure in doing things Not at all Feeling down, depressed, irritable, or hopeless Several days Total Score PHQ 2 1 Verbal order for in office high dose flu injection and PCV13 per Isidore Fothergill, PA/Princess Mc LPN Chantell Sky is a 80 y.o. female who presents for routine immunizations. She denies any symptoms , reactions or allergies that would exclude them from being immunized today. Risks and adverse reactions were discussed and the VIS was given to them. All questions were addressed. She was observed for 10 min post injection. There were no reactions observed.  
 
Tc Garvin LPN

## 2018-10-23 RX ORDER — ATORVASTATIN CALCIUM 80 MG/1
TABLET, FILM COATED ORAL
Qty: 90 TAB | Refills: 1 | Status: SHIPPED | OUTPATIENT
Start: 2018-10-23 | End: 2019-01-01 | Stop reason: SDUPTHER

## 2018-11-02 ENCOUNTER — DOCUMENTATION ONLY (OUTPATIENT)
Dept: FAMILY MEDICINE CLINIC | Age: 83
End: 2018-11-02

## 2018-11-02 NOTE — ACP (ADVANCE CARE PLANNING)
Tommy Whitmore Invitation    Patient was invited to [employer] on 10/15/18 and given the information folder for review. [facility] facilit[facility] for ACP conversation regarding advance directives. [] Yes  [x] No  Patient scheduled an appointment.

## 2018-11-05 ENCOUNTER — APPOINTMENT (OUTPATIENT)
Dept: CT IMAGING | Age: 83
End: 2018-11-05
Attending: EMERGENCY MEDICINE
Payer: MEDICARE

## 2018-11-05 ENCOUNTER — HOSPITAL ENCOUNTER (EMERGENCY)
Age: 83
Discharge: HOME | End: 2018-11-06
Attending: EMERGENCY MEDICINE
Payer: MEDICARE

## 2018-11-05 ENCOUNTER — APPOINTMENT (OUTPATIENT)
Dept: GENERAL RADIOLOGY | Age: 83
End: 2018-11-05
Attending: EMERGENCY MEDICINE
Payer: MEDICARE

## 2018-11-05 VITALS
TEMPERATURE: 97.1 F | DIASTOLIC BLOOD PRESSURE: 70 MMHG | SYSTOLIC BLOOD PRESSURE: 126 MMHG | OXYGEN SATURATION: 100 % | BODY MASS INDEX: 19.58 KG/M2 | WEIGHT: 125 LBS | HEART RATE: 65 BPM | RESPIRATION RATE: 20 BRPM

## 2018-11-05 DIAGNOSIS — R55 SYNCOPE AND COLLAPSE: Primary | ICD-10-CM

## 2018-11-05 LAB
ANION GAP SERPL CALC-SCNC: 8 MMOL/L (ref 3–18)
APPEARANCE UR: CLEAR
BASOPHILS # BLD: 0 K/UL (ref 0–0.1)
BASOPHILS NFR BLD: 1 % (ref 0–2)
BILIRUB UR QL: NEGATIVE
BUN SERPL-MCNC: 27 MG/DL (ref 7–18)
BUN/CREAT SERPL: 26 (ref 12–20)
CALCIUM SERPL-MCNC: 8.3 MG/DL (ref 8.5–10.1)
CHLORIDE SERPL-SCNC: 110 MMOL/L (ref 100–108)
CK MB CFR SERPL CALC: 1.8 % (ref 0–4)
CK MB SERPL-MCNC: 2.1 NG/ML (ref 5–25)
CK SERPL-CCNC: 117 U/L (ref 26–192)
CO2 SERPL-SCNC: 25 MMOL/L (ref 21–32)
COLOR UR: YELLOW
CREAT SERPL-MCNC: 1.04 MG/DL (ref 0.6–1.3)
DIFFERENTIAL METHOD BLD: ABNORMAL
EOSINOPHIL # BLD: 0.1 K/UL (ref 0–0.4)
EOSINOPHIL NFR BLD: 1 % (ref 0–5)
EPITH CASTS URNS QL MICRO: ABNORMAL /LPF (ref 0–5)
ERYTHROCYTE [DISTWIDTH] IN BLOOD BY AUTOMATED COUNT: 13.1 % (ref 11.6–14.5)
GLUCOSE SERPL-MCNC: 130 MG/DL (ref 74–99)
GLUCOSE UR STRIP.AUTO-MCNC: NEGATIVE MG/DL
HCT VFR BLD AUTO: 31.1 % (ref 35–45)
HGB BLD-MCNC: 10.4 G/DL (ref 12–16)
HGB UR QL STRIP: NEGATIVE
HYALINE CASTS URNS QL MICRO: ABNORMAL /LPF (ref 0–2)
KETONES UR QL STRIP.AUTO: ABNORMAL MG/DL
LACTATE BLD-SCNC: 1.27 MMOL/L (ref 0.4–2)
LACTATE BLD-SCNC: 2.92 MMOL/L (ref 0.4–2)
LEUKOCYTE ESTERASE UR QL STRIP.AUTO: ABNORMAL
LYMPHOCYTES # BLD: 1.6 K/UL (ref 0.9–3.6)
LYMPHOCYTES NFR BLD: 28 % (ref 21–52)
MCH RBC QN AUTO: 31.3 PG (ref 24–34)
MCHC RBC AUTO-ENTMCNC: 33.4 G/DL (ref 31–37)
MCV RBC AUTO: 93.7 FL (ref 74–97)
MONOCYTES # BLD: 0.4 K/UL (ref 0.05–1.2)
MONOCYTES NFR BLD: 7 % (ref 3–10)
MUCOUS THREADS URNS QL MICRO: ABNORMAL /LPF
NEUTS SEG # BLD: 3.7 K/UL (ref 1.8–8)
NEUTS SEG NFR BLD: 63 % (ref 40–73)
NITRITE UR QL STRIP.AUTO: NEGATIVE
PH UR STRIP: 5 [PH] (ref 5–8)
PLATELET # BLD AUTO: 239 K/UL (ref 135–420)
PMV BLD AUTO: 11.4 FL (ref 9.2–11.8)
POTASSIUM SERPL-SCNC: 3.5 MMOL/L (ref 3.5–5.5)
PROT UR STRIP-MCNC: NEGATIVE MG/DL
RBC # BLD AUTO: 3.32 M/UL (ref 4.2–5.3)
RBC #/AREA URNS HPF: ABNORMAL /HPF (ref 0–5)
SODIUM SERPL-SCNC: 143 MMOL/L (ref 136–145)
SP GR UR REFRACTOMETRY: 1.03 (ref 1–1.03)
TROPONIN I SERPL-MCNC: <0.02 NG/ML (ref 0–0.04)
UROBILINOGEN UR QL STRIP.AUTO: 1 EU/DL (ref 0.2–1)
WBC # BLD AUTO: 5.9 K/UL (ref 4.6–13.2)
WBC URNS QL MICRO: ABNORMAL /HPF (ref 0–4)

## 2018-11-05 PROCEDURE — 82553 CREATINE MB FRACTION: CPT

## 2018-11-05 PROCEDURE — 74011250636 HC RX REV CODE- 250/636: Performed by: EMERGENCY MEDICINE

## 2018-11-05 PROCEDURE — 70450 CT HEAD/BRAIN W/O DYE: CPT

## 2018-11-05 PROCEDURE — 93005 ELECTROCARDIOGRAM TRACING: CPT

## 2018-11-05 PROCEDURE — 87040 BLOOD CULTURE FOR BACTERIA: CPT | Performed by: EMERGENCY MEDICINE

## 2018-11-05 PROCEDURE — 81001 URINALYSIS AUTO W/SCOPE: CPT

## 2018-11-05 PROCEDURE — 71045 X-RAY EXAM CHEST 1 VIEW: CPT

## 2018-11-05 PROCEDURE — 83605 ASSAY OF LACTIC ACID: CPT

## 2018-11-05 PROCEDURE — 99285 EMERGENCY DEPT VISIT HI MDM: CPT

## 2018-11-05 PROCEDURE — 85025 COMPLETE CBC W/AUTO DIFF WBC: CPT

## 2018-11-05 PROCEDURE — 80048 BASIC METABOLIC PNL TOTAL CA: CPT

## 2018-11-05 PROCEDURE — 96361 HYDRATE IV INFUSION ADD-ON: CPT

## 2018-11-05 PROCEDURE — 96365 THER/PROPH/DIAG IV INF INIT: CPT

## 2018-11-05 RX ORDER — SODIUM CHLORIDE 0.9 % (FLUSH) 0.9 %
5-10 SYRINGE (ML) INJECTION AS NEEDED
Status: DISCONTINUED | OUTPATIENT
Start: 2018-11-05 | End: 2018-11-06 | Stop reason: HOSPADM

## 2018-11-05 RX ORDER — LEVOFLOXACIN 5 MG/ML
750 INJECTION, SOLUTION INTRAVENOUS
Status: COMPLETED | OUTPATIENT
Start: 2018-11-05 | End: 2018-11-05

## 2018-11-05 RX ADMIN — LEVOFLOXACIN 750 MG: 5 INJECTION, SOLUTION INTRAVENOUS at 21:23

## 2018-11-05 RX ADMIN — SODIUM CHLORIDE 1000 ML: 900 INJECTION, SOLUTION INTRAVENOUS at 18:40

## 2018-11-05 RX ADMIN — SODIUM CHLORIDE 1000 ML: 900 INJECTION, SOLUTION INTRAVENOUS at 21:23

## 2018-11-05 NOTE — ED NOTES
Pt brief was wet on arrival, changed into clean and dry brief, wet home clothing removed and placed in gown. Family at bedside.

## 2018-11-05 NOTE — ED TRIAGE NOTES
Pt arrived via EMS after an episode of syncope while eating dinner, family was vague with EMS regarding previous health conditions, med list arrived with patient, 0.5mg narcan given for pinpoint pupils with no effect, pt arousable to voice, states name and birthday, states she is cold, NAD.

## 2018-11-05 NOTE — ED PROVIDER NOTES
EMERGENCY DEPARTMENT HISTORY AND PHYSICAL EXAM 
 
6:54 PM 
 
 
Date: 11/5/2018 Patient Name: Jie Adorno History of Presenting Illness Chief Complaint Patient presents with  Syncope History Provided By: Patient and Patient's Daughter Chief Complaint: Syncopal episode Duration:  Today Timing:  Acute Location: Neuro Quality: not reported Severity: Not described Modifying Factors: No modifying or aggravating factors were reported. Associated Symptoms: denies any other associated signs or symptoms Additional History (Context): Jie Adorno is a 80 y.o. female with PMHx significant for HTN, GERD, DM and dementia who presents to the ED with c/o acute syncopal episode after bending forward while on the wheelchair after dinner. Daughter reports few seconds of LOC, \"she woke up when ambulance got there. \" Admits to similar symptoms 1x every year, \"last time it was due to low oxygen. \" Denies fall, \"she was sitting face down. \" Denies choking episode. Family reports patient has been in stable condition. Her baseline is dementia, Sneha Mariee gets worked up. She thinks she has to go to the bathroom, they just catheterized her. \" Note good PO intake, report difficulty drinking water for the past 2 months, \"she will drink juice. \" Admits patient does not ambulate, is confined to her wheelchair. Last BM was two days ago. Denies recent hospitalizations or change in medications. Family reports history of a minor MI, for which she was started on Atorvastatin. Patient allergic to Streptomycin. No modifying or aggravating factors were reported. No other symptoms or concerns were expressed. PCP: DIAN Doll Current Facility-Administered Medications Medication Dose Route Frequency Provider Last Rate Last Dose  sodium chloride (NS) flush 5-10 mL  5-10 mL IntraVENous PRN Richard Bajwa MD      
 sodium chloride 0.9 % bolus infusion 701 mL  701 mL IntraVENous ONCE Annette Bradford MD      
 
Current Outpatient Medications Medication Sig Dispense Refill  atorvastatin (LIPITOR) 80 mg tablet take 1 tablet by mouth once daily at bedtime 90 Tab 1  
 lisinopril (PRINIVIL, ZESTRIL) 5 mg tablet Take 1 Tab by mouth daily. 90 Tab 1  raNITIdine (ZANTAC) 150 mg tablet TAKE 1 TABLET TWICE DAILY 180 Tab 0  
 memantine (NAMENDA) 10 mg tablet 1 q am 90 Tab 4  
 hydrocortisone (ALA-DEEDEE) 1 % lotion Apply  to affected area two (2) times a day. use thin layer 60 g 2  
 ferrous sulfate 324 mg (65 mg iron) tablet take 1 tablet by mouth twice a day 60 Tab 1  
 CALCIUM 600 600 mg calcium (1,500 mg) tablet take 1 tablet by mouth twice a day 60 Tab 6  
 acetaminophen (TYLENOL EXTRA STRENGTH) 500 mg tablet Take  by mouth every six (6) hours as needed for Pain.  dorzolamide (TRUSOPT) 2 % ophthalmic solution Administer 2 Drops to both eyes three (3) times daily.  aspirin delayed-release 81 mg tablet take 1 tablet by mouth once daily 30 Tab 6  
 latanoprost (XALATAN) 0.005 % ophthalmic solution Administer 1 Drop to both eyes nightly.  multivitamin (DAILY MULTIPLE) tablet Take 1 Tab by mouth daily. 30 Tab 6  peg 400-propylene glycol (SYSTANE) 0.4-0.3 % drop Administer  to left eye as needed. Past History Past Medical History: 
Past Medical History:  
Diagnosis Date  ACS (acute coronary syndrome) ([facility] [address] NSTEMI  Arthritis  Breast cancer [facility]) 2007  
 right breast (radiation)  Dementia  Diabetes ([facility] [address] diet controlled  GERD (gastroesophageal reflux disease)  Glaucoma  Hypertension Past Surgical History: 
Past Surgical History:  
Procedure Laterality Date  HX APPENDECTOMY  HX BREAST BIOPSY  HX CHOLECYSTECTOMY Family History: 
Family History Problem Relation Age of Onset  Cancer Mother  Cancer Father  Diabetes Sister  Diabetes Paternal Grandmother  Cancer Other Social History: 
Social History Tobacco Use  Smoking status: Never Smoker  Smokeless tobacco: Never Used Substance Use Topics  Alcohol use: No  
 Drug use: No  
 
 
Allergies: Allergies Allergen Reactions  Pcn [Penicillins] Other (comments) Unknown-happened when she was very young Review of Systems Review of Systems Constitutional: Negative for chills, fatigue and fever. HENT: Negative for congestion, rhinorrhea and sore throat. Eyes: Negative for visual disturbance. Respiratory: Negative for cough, shortness of breath and wheezing. Cardiovascular: Negative for chest pain, palpitations and leg swelling. Gastrointestinal: Negative for abdominal pain, diarrhea, nausea and vomiting. Genitourinary: Negative for difficulty urinating and dysuria. Musculoskeletal: Negative for arthralgias. Skin: Negative for rash. Neurological: Positive for syncope. Negative for weakness and headaches. All other systems reviewed and are negative. Physical Exam  
 
Visit Vitals BP (!) 121/94 Pulse (!) 51 Temp 97.1 °F (36.2 °C) Resp 22 Wt 56.7 kg (125 lb) SpO2 100% BMI 19.58 kg/m² Physical Exam  
Constitutional: She is oriented to person, place, and time. She appears well-developed and well-nourished. No distress. HENT:  
Head: Normocephalic and atraumatic. Mouth/Throat: Oropharynx is clear and moist and mucous membranes are normal. No oropharyngeal exudate. Eyes: Conjunctivae and EOM are normal. No scleral icterus. Neck: Normal range of motion. Neck supple. No JVD present. No thyromegaly present. Cardiovascular: Normal rate, regular rhythm, S1 normal, S2 normal, normal heart sounds and intact distal pulses. Exam reveals no gallop and no friction rub. No murmur heard. Pulmonary/Chest: Effort normal and breath sounds normal. No accessory muscle usage. No respiratory distress. She has no wheezes.  She has no rhonchi. She has no rales. She exhibits no tenderness. Abdominal: Soft. Normal appearance and bowel sounds are normal. She exhibits no distension and no pulsatile midline mass. There is no tenderness. There is no rebound and no guarding. Musculoskeletal: Normal range of motion. Lymphadenopathy:  
     Head (right side): No submandibular adenopathy present. She has no cervical adenopathy. Neurological: She is alert and oriented to person, place, and time. Moving all extremities. No obvious deficits or facial asymmetry. Skin: Skin is warm, dry and intact. No rash noted. No erythema. Psychiatric: She has a normal mood and affect. Her speech is normal and behavior is normal.  
Nursing note and vitals reviewed. Diagnostic Study Results Labs - Recent Results (from the past 12 hour(s)) URINALYSIS W/ RFLX MICROSCOPIC Collection Time: 11/05/18  6:20 PM  
Result Value Ref Range Color YELLOW Appearance CLEAR Specific gravity 1.028 1.005 - 1.030    
 pH (UA) 5.0 5.0 - 8.0 Protein NEGATIVE  NEG mg/dL Glucose NEGATIVE  NEG mg/dL Ketone TRACE (A) NEG mg/dL Bilirubin NEGATIVE  NEG Blood NEGATIVE  NEG Urobilinogen 1.0 0.2 - 1.0 EU/dL Nitrites NEGATIVE  NEG Leukocyte Esterase TRACE (A) NEG URINE MICROSCOPIC ONLY Collection Time: 11/05/18  6:20 PM  
Result Value Ref Range WBC 0 to 3 0 - 4 /hpf  
 RBC 4 to 10 0 - 5 /hpf Epithelial cells 1+ 0 - 5 /lpf Mucus 1+ (A) NEG /lpf Hyaline cast 0 to 3 0 - 2 /lpf EKG, 12 LEAD, INITIAL Collection Time: 11/05/18  6:24 PM  
Result Value Ref Range Ventricular Rate 53 BPM  
 Atrial Rate 53 BPM  
 P-R Interval 208 ms QRS Duration 138 ms Q-T Interval 496 ms QTC Calculation (Bezet) 465 ms Calculated P Axis 65 degrees Calculated R Axis -54 degrees Calculated T Axis -90 degrees Diagnosis Sinus bradycardia Left axis deviation Nonspecific intraventricular block Cannot rule out Septal infarct , age undetermined T wave abnormality, consider inferolateral ischemia Abnormal ECG When compared with ECG of 30-OCT-2017 19:42, 
premature atrial complexes are no longer present CBC WITH AUTOMATED DIFF Collection Time: 11/05/18  6:32 PM  
Result Value Ref Range WBC 5.9 4.6 - 13.2 K/uL  
 RBC 3.32 (L) 4.20 - 5.30 M/uL  
 HGB 10.4 (L) 12.0 - 16.0 g/dL HCT 31.1 (L) 35.0 - 45.0 % MCV 93.7 74.0 - 97.0 FL  
 MCH 31.3 24.0 - 34.0 PG  
 MCHC 33.4 31.0 - 37.0 g/dL  
 RDW 13.1 11.6 - 14.5 % PLATELET 236 859 - 022 K/uL MPV 11.4 9.2 - 11.8 FL  
 NEUTROPHILS 63 40 - 73 % LYMPHOCYTES 28 21 - 52 % MONOCYTES 7 3 - 10 % EOSINOPHILS 1 0 - 5 % BASOPHILS 1 0 - 2 %  
 ABS. NEUTROPHILS 3.7 1.8 - 8.0 K/UL  
 ABS. LYMPHOCYTES 1.6 0.9 - 3.6 K/UL  
 ABS. MONOCYTES 0.4 0.05 - 1.2 K/UL  
 ABS. EOSINOPHILS 0.1 0.0 - 0.4 K/UL  
 ABS. BASOPHILS 0.0 0.0 - 0.1 K/UL  
 DF AUTOMATED METABOLIC PANEL, BASIC Collection Time: 11/05/18  6:32 PM  
Result Value Ref Range Sodium 143 136 - 145 mmol/L Potassium 3.5 3.5 - 5.5 mmol/L Chloride 110 (H) 100 - 108 mmol/L  
 CO2 25 21 - 32 mmol/L Anion gap 8 3.0 - 18 mmol/L Glucose 130 (H) 74 - 99 mg/dL BUN 27 (H) 7.0 - 18 MG/DL Creatinine 1.04 0.6 - 1.3 MG/DL  
 BUN/Creatinine ratio 26 (H) 12 - 20 GFR est AA >60 >60 ml/min/1.73m2 GFR est non-AA 50 (L) >60 ml/min/1.73m2 Calcium 8.3 (L) 8.5 - 10.1 MG/DL  
CARDIAC PANEL,(CK, CKMB & TROPONIN) Collection Time: 11/05/18  6:32 PM  
Result Value Ref Range  26 - 192 U/L  
 CK - MB 2.1 <3.6 ng/ml CK-MB Index 1.8 0.0 - 4.0 % Troponin-I, Qt. <0.02 0.0 - 0.045 NG/ML  
POC LACTIC ACID Collection Time: 11/05/18  6:41 PM  
Result Value Ref Range Lactic Acid (POC) 2.92 (HH) 0.40 - 2.00 mmol/L POC LACTIC ACID Collection Time: 11/05/18 11:09 PM  
Result Value Ref Range Lactic Acid (POC) 1.27 0.40 - 2.00 mmol/L Radiologic Studies -  
 Ct Head Wo Cont Result Date: 11/5/2018 Description:  CT head without contrast TECHNIQUE: Axial CT imaging of the head without IV contrast was performed. Coronal and sagittal reformations generated and reviewed. All CT scans at this facility are performed using dose optimization technique as appropriate to a performed exam, to include automated exposure control, adjustment of the mA and/or kV according to patient size (including appropriate matching for site-specific examinations), or use of iterative reconstruction technique. Clinical Indication:  Syncope while eating dinner. Comparison: August 25, 2016. Findings:  Cortical sulci, basilar cisterns and ventricles appear within normal limits in size and configuration. There is mild to moderate low-attenuation change within cerebral white matter. No hemorrhage. No extra-axial fluid. No mass or mass effect. The visualized paranasal sinuses and mastoid air cells are clear. Impression:  No acute intracranial findings. Mild to moderate presumed chronic microvascular ischemic change. [facility] Result Date: 11/5/2018 EXAM: CHEST ONE VIEW  portable 1858 hours CLINICAL HISTORY/INDICATION: syncope prior to arrival, several seconds of loss of consciousness, baseline dementia COMPARISON: Chest x-ray October 30, 2017. TECHNIQUE: One view FINDINGS: The cardiac silhouette is normal. Calcified plaque is demonstrated within the arch of the aorta which is mildly  tortuous. The lungs are clear. Pulmonary vascularity is normal. The costophrenic angles are sharply defined. Bilateral glenohumeral joint space narrowing with spurring. Surgical excision of the distal and of the right clavicle stable. Julien Snyder IMPRESSION: Atherosclerosis. Medical Decision Making I am the first provider for this patient. I reviewed the vital signs, available nursing notes, past medical history, past surgical history, family history and social history. Vital Signs-Reviewed the patient's vital signs. Pulse Oximetry Analysis -  100% on room air, stable. Cardiac Monitor: 
Rate: 62 Rhythm:  Normal Sinus Rhythm EKG: 
EK, rate 53bpm 
Sinus Gattis Ache LAD, nonspecific interventricular conduction delay Twi in V4-V6, Q-wave in V2-V3 
-No change compared to 30 Oct 2017 Records Reviewed: Nursing Notes and Old Medical Records (Time of Review: 6:54 PM) Provider Notes (Medical Decision Making): ASSESSMENT / PLAN: 
   84y/o AA woman, PMHx of Dementia (moderate, A&O to person only at baseline but can still feed herself), wheelchari bound due to OA, GERD, DM who presents w/syncope episode. Family report she slumped over and became unresponsive for 'a few minutes' after eating. Wasn't choking, was breathing though. Regained consciousness / back to baseline. No seizure like activity. Pt can't give great history. Never had this before. No know structural heart dz, no seizures in past.   
 
  On exam, elderly lady w/dementia, laying in bed. Initial BP was 80/50 but repeat was ~172 systolic. HR 50's. Rest normal. Dementia, A&O x 1, otherwise normal neuro exam.  
 
-EKG w/old BBB, old Q-waves in V2-V3, Twave inversions in lateral leads. No acute ischemia changes.  
-Head CT w/old changes, nothing new -CBC baseline 
-CMP baseline 
-UA no infection 
-Lactate initially 2.9, repeated 1.2 after IVF 
-Blood Cultures drawn initially and Levoflox dose x 1 given for unclear cause, possible sepsis. -Pt has remained at baseline mental status per family, no more events on monitor and no more syncope events here. Vitals remain normal other than her chronic sinus bradycardia in the 50-60's.   
-Will dc home 
-Encouarged PO flud intake, continue meds as before  
-F/U with PCP 
-Return to ED for any more syncope or any other concerns. Jh Moses MD 
EM-IM Physician Diagnosis Clinical Impression: 1. Syncope and collapse Disposition: Home Follow-up Information None Medication List  
  
CONTINUE taking these medications   
aspirin delayed-release 81 mg tablet 
take 1 tablet by mouth once daily 
  
atorvastatin 80 mg tablet Commonly known as:  LIPITOR 
take 1 tablet by mouth once daily at bedtime CALCIUM 600 600 mg calcium (1,500 mg) tablet Generic drug:  calcium carbonate 
take 1 tablet by mouth twice a day 
  
dorzolamide 2 % ophthalmic solution Commonly known as:  TRUSOPT 
  
ferrous sulfate 324 mg (65 mg iron) tablet 
take 1 tablet by mouth twice a day 
  
hydrocortisone 1 % lotion Commonly known as:  ALA-DEEDEE Apply  to affected area two (2) times a day. use thin layer 
  
latanoprost 0.005 % ophthalmic solution Commonly known as:  XALATAN 
  
lisinopril 5 mg tablet Commonly known as:  [place] Take 1 Tab by mouth daily. memantine 10 mg tablet Commonly known as:  NAMENDA 
1 q am 
  
multivitamin tablet Commonly known as:  DAILY MULTIPLE Take 1 Tab by mouth daily. raNITIdine 150 mg tablet Commonly known as:  ZANTAC TAKE 1 TABLET TWICE DAILY 
  
SYSTANE (PROPYLENE GLYCOL) 0.4-0.3 % Drop Generic drug:  peg 400-propylene glycol TYLENOL EXTRA STRENGTH 500 mg tablet Generic drug:  acetaminophen 
  
  
 
_______________________________ Attestations: 
Scribe Attestation Martha Hernandez acting as a scribe for and in the presence of Ebony Cary MD     
November 05, 2018 at [facility] 
    
Provider Attestation:     
I personally performed the services described in the documentation, reviewed the documentation, as recorded by the scribe in my presence, and it accurately and completely records my words and actions. November 05, 2018 at 6:54 PM - Ebony Cary MD   
_______________________________

## 2018-11-06 ENCOUNTER — TELEPHONE (OUTPATIENT)
Dept: FAMILY MEDICINE CLINIC | Age: 83
End: 2018-11-06

## 2018-11-06 LAB
ATRIAL RATE: 53 BPM
CALCULATED P AXIS, ECG09: 65 DEGREES
CALCULATED R AXIS, ECG10: -54 DEGREES
CALCULATED T AXIS, ECG11: -90 DEGREES
DIAGNOSIS, 93000: NORMAL
P-R INTERVAL, ECG05: 208 MS
Q-T INTERVAL, ECG07: 496 MS
QRS DURATION, ECG06: 138 MS
QTC CALCULATION (BEZET), ECG08: 465 MS
VENTRICULAR RATE, ECG03: 53 BPM

## 2018-11-06 NOTE — TELEPHONE ENCOUNTER
DIAN Gonzalez Mai sent to Justine Red LPN; Your, Ronda Martinez RN             Patient was recently in the ER for unexplained syncope. Will need close follow up in the office. Called home number. Verified name and . Spoke with patient daughter. Notified of message from provider. Offered appointment for this week but daughter declined and stated patient needs 5 days out due to transportation requirements. Daughter was offered and accepted an appointment for 2018 which daughter stated would allow plenty time for transportation. Daughter understood the reason for call and had no further questions or concerns.

## 2018-11-06 NOTE — ED NOTES
I have reviewed discharge instructions with the adult child. The adult child verbalized understanding. No further questions at this time. Patient being picked up by [facility]

## 2018-11-06 NOTE — DISCHARGE INSTRUCTIONS
Fainting: Care Instructions  Your Care Instructions    When you faint, or pass out, you lose consciousness for a short time. A brief drop in blood flow to the brain often causes it. When you fall or lie down, more blood flows to your brain and you regain consciousness. Emotional stress, pain, or overheating--especially if you have been standing--can make you faint. In these cases, fainting is usually not serious. But fainting can be a sign of a more serious problem. Your doctor may want you to have more tests to rule out other causes. The treatment you need depends on the reason why you fainted. The doctor has checked you carefully, but problems can develop later. If you notice any problems or new symptoms, get medical treatment right away. Follow-up care is a key part of your treatment and safety. Be sure to make and go to all appointments, and call your doctor if you are having problems. It's also a good idea to know your test results and keep a list of the medicines you take. How can you care for yourself at home? · Drink plenty of fluids to prevent dehydration. If you have kidney, heart, or liver disease and have to limit fluids, talk with your doctor before you increase your fluid intake. When should you call for help? Call 911 anytime you think you may need emergency care. For example, call if:    · You have symptoms of a heart problem. These may include:  ? Chest pain or pressure. ? Severe trouble breathing. ? A fast or irregular heartbeat. ? Lightheadedness or sudden weakness. ? Coughing up pink, foamy mucus. ? Passing out. After you call 911, the  may tell you to chew 1 adult-strength or 2 to 4 low-dose aspirin. Wait for an ambulance. Do not try to drive yourself.     · You have symptoms of a stroke. These may include:  ? Sudden numbness, tingling, weakness, or loss of movement in your face, arm, or leg, especially on only one side of your body. ? Sudden vision changes.   ? Sudden trouble speaking. ? Sudden confusion or trouble understanding simple statements. ? Sudden problems with walking or balance. ? A sudden, severe headache that is different from past headaches.     · You passed out (lost consciousness) again.    Watch closely for changes in your health, and be sure to contact your doctor if:    · You do not get better as expected. Where can you learn more? Go to http://michell-everardo.info/. Enter G518 in the search box to learn more about \"Fainting: Care Instructions. \"  Current as of: November 20, 2017  Content Version: 11.8  © 1877-5086 [employer]. Care instructions adapted under license by [employer] (which disclaims liability or warranty for this information). If you have questions about a medical condition or this instruction, always ask your healthcare professional. [address] any warranty or liability for your use of this information.

## 2018-11-16 ENCOUNTER — OFFICE VISIT (OUTPATIENT)
Dept: FAMILY MEDICINE CLINIC | Age: 83
End: 2018-11-16

## 2018-11-16 VITALS
TEMPERATURE: 98.1 F | HEART RATE: 84 BPM | HEIGHT: 67 IN | RESPIRATION RATE: 14 BRPM | SYSTOLIC BLOOD PRESSURE: 110 MMHG | BODY MASS INDEX: 19.58 KG/M2 | DIASTOLIC BLOOD PRESSURE: 70 MMHG | OXYGEN SATURATION: 98 %

## 2018-11-16 DIAGNOSIS — R55 SYNCOPE, UNSPECIFIED SYNCOPE TYPE: Primary | ICD-10-CM

## 2018-11-16 NOTE — PROGRESS NOTES
Chief Complaint   Patient presents with   [facility] Follow Up     [facility] x 1 week ago for syncope episode; no clear etiology after ER visit per daughter     Patient accompanied today by her daughter Oneal Strong. 1. Have you been to the ER, urgent care clinic since your last visit? Hospitalized since your last visit? Yes Where: [facility]    2. Have you seen or consulted any other health care providers outside of the [address] [place] [address] since your last visit? Include any pap smears or colon screening.  No

## 2018-11-16 NOTE — PROGRESS NOTES
Patient: Angela Jj MRN: [id number]  SSN: [id number]    YOB: 1933  Age: 80 y.o. Sex: female      Date of Service: 11/16/2018   Provider: DIAN Ocampo   Office Location:   [facility], [address] [place], [address]  Office Phone: [phone]  Office Fax: [phone]      REASON FOR VISIT:   Chief Complaint   Patient presents with   [facility] Follow Up     [facility] x 1 week ago for syncope episode; no clear etiology after ER visit        VITALS:   Visit Vitals  /70 (BP 1 Location: Left arm, BP Patient Position: Sitting)   Pulse 84   Temp 98.1 °F (36.7 °C) (Oral)   Resp 14   Ht 5' 7\" (1.702 m)   SpO2 98%   BMI 19.58 kg/m²        MEDICATIONS:   Current Outpatient Medications on File Prior to Visit   Medication Sig Dispense Refill    atorvastatin (LIPITOR) 80 mg tablet take 1 tablet by mouth once daily at bedtime 90 Tab 1    lisinopril (PRINIVIL, ZESTRIL) 5 mg tablet Take 1 Tab by mouth daily. 90 Tab 1    raNITIdine (ZANTAC) 150 mg tablet TAKE 1 TABLET TWICE DAILY 180 Tab 0    memantine (NAMENDA) 10 mg tablet 1 q am 90 Tab 4    hydrocortisone (ALA-DEEDEE) 1 % lotion Apply  to affected area two (2) times a day. use thin layer 60 g 2    ferrous sulfate 324 mg (65 mg iron) tablet take 1 tablet by mouth twice a day 60 Tab 1    CALCIUM 600 600 mg calcium (1,500 mg) tablet take 1 tablet by mouth twice a day 60 Tab 6    acetaminophen (TYLENOL EXTRA STRENGTH) 500 mg tablet Take  by mouth every six (6) hours as needed for Pain.  dorzolamide (TRUSOPT) 2 % ophthalmic solution Administer 2 Drops to both eyes three (3) times daily.  aspirin delayed-release 81 mg tablet take 1 tablet by mouth once daily 30 Tab 6    latanoprost (XALATAN) 0.005 % ophthalmic solution Administer 1 Drop to both eyes nightly.  multivitamin (DAILY MULTIPLE) tablet Take 1 Tab by mouth daily.  30 Tab 6    peg 400-propylene glycol (SYSTANE) 0.4-0.3 % drop Administer  to left eye as needed. No current facility-administered medications on file prior to visit. ALLERGIES:   Allergies   Allergen Reactions    Pcn [Penicillins] Other (comments)     Unknown-happened when she was very young        MEDICAL/SURGICAL HISTORY:  Past Medical History:   Diagnosis Date    ACS (acute coronary syndrome) ([facility]     NSTEMI    Arthritis     Breast cancer ([facility] 2007    right breast (radiation)    Dementia     Diabetes ([facility]     diet controlled    GERD (gastroesophageal reflux disease)     Glaucoma     Hypertension       Past Surgical History:   Procedure Laterality Date    HX APPENDECTOMY      HX BREAST BIOPSY      HX CHOLECYSTECTOMY          FAMILY HISTORY:  Family History   Problem Relation Age of Onset    Cancer Mother     Cancer Father     Diabetes Sister     Diabetes Paternal Grandmother     Cancer Other         SOCIAL HISTORY:  Social History     Tobacco Use    Smoking status: Never Smoker    Smokeless tobacco: Never Used   Substance Use Topics    Alcohol use: No    Drug use: No             HISTORY OF PRESENT ILLNESS: Sandeep Pulido is a 80 y.o. female who presents to the office for an ER follow up visit. She is accompanied by her daughter today, who provides the history. Patient was brought to the ED at [facility] on 11/5/18 for suspected syncope. Apparently patient slumped forward and lost consciousness while eating dinner earlier that evening. The family called 46, but patient had regained consciousness by the time EMS got there and was at her baseline mental status in the ED. Workup included EKG, which showed bradycardia and a nonspecific interventricular delay, but was unchanged from previous. Head CT and CXR were negative for any acute process. Her lactate was elevated initially, so she was given a dose of abx, but lactate improved with IV hydration. Patient was ultimately discharged in her baseline, stable condition.      Today, patient reports she is feeling well. Daughter reports she has been in her usual state of health since ER d/c. No acute concerns today. REVIEW OF SYSTEMS:  Review of Systems   Constitutional: Negative for chills, fever and malaise/fatigue. Respiratory: Negative for cough, shortness of breath and wheezing. Cardiovascular: Negative for chest pain and leg swelling. Gastrointestinal: Negative for abdominal pain, constipation, diarrhea and vomiting. Neurological: Negative for dizziness, focal weakness and seizures. PHYSICAL EXAMINATION:  Physical Exam   Constitutional: She is oriented to person, place, and time and well-developed, well-nourished, and in no distress. Cardiovascular: Regular rhythm and normal heart sounds. Bradycardia present. Exam reveals no gallop and no friction rub. No murmur heard. Pulmonary/Chest: Effort normal and breath sounds normal. She has no wheezes. She has no rales. Neurological: She is alert and oriented to person, place, and time. Skin: Skin is warm and dry. No rash noted. Psychiatric: Mood, memory and affect normal.        RESULTS:  No results found for this visit on 11/16/18. ASSESSMENT/PLAN:  Diagnoses and all orders for this visit:    1. Syncope, unspecified syncope type  - Etiology unclear. Patient is back to her baseline and feeling well  - Expressed my concern that syncope may have been related to her bradycardia or an underlying cardiac arrhythmia, and encouraged daughter to call cardiology office to notify Dr. Simi Martinez of ER visit, and determine if she needs to be seen sooner than scheduled. - In the meantime, ok to simply monitor. Return to ER immediately if syncope recurs       Follow-up Disposition:  Return as scheduled. All questions answered. Patient expresses understanding and agrees with the plan as detailed above.     PATIENT CARE TEAM:   Patient Care Team:  DIAN Cantrell as PCP - General (Physician Assistant)  Wes Licona MD as Consulting Provider (Neurology)  Andrew Ware MD (Cardiology)  Chevy Peres MD (Ophthalmology)  Ryan Levine MD (Ophthalmology)       DIAN Hayward   November 16, 2018   8:18 AM

## 2018-12-11 DIAGNOSIS — Z76.0 MEDICATION REFILL: ICD-10-CM

## 2018-12-11 DIAGNOSIS — K21.00 GASTROESOPHAGEAL REFLUX DISEASE WITH ESOPHAGITIS: ICD-10-CM

## 2018-12-11 RX ORDER — RANITIDINE 150 MG/1
TABLET, FILM COATED ORAL
Qty: 180 TAB | Refills: 0 | Status: SHIPPED | OUTPATIENT
Start: 2018-12-11 | End: 2019-01-01 | Stop reason: SDUPTHER

## 2019-01-01 ENCOUNTER — TELEPHONE (OUTPATIENT)
Dept: FAMILY MEDICINE CLINIC | Age: 84
End: 2019-01-01

## 2019-01-01 ENCOUNTER — HOSPITAL ENCOUNTER (OUTPATIENT)
Dept: LAB | Age: 84
Discharge: HOME | End: 2019-05-14
Payer: MEDICARE

## 2019-01-01 ENCOUNTER — HOME CARE VISIT (OUTPATIENT)
Dept: HOME HEALTH SERVICES | Facility: HOME HEALTH | Age: 84
End: 2019-01-01
Payer: MEDICARE

## 2019-01-01 ENCOUNTER — HOME CARE VISIT (OUTPATIENT)
Dept: SCHEDULING | Facility: HOME HEALTH | Age: 84
End: 2019-01-01
Payer: MEDICARE

## 2019-01-01 ENCOUNTER — OFFICE VISIT (OUTPATIENT)
Dept: NEUROLOGY | Age: 84
End: 2019-01-01

## 2019-01-01 ENCOUNTER — TELEPHONE (OUTPATIENT)
Dept: NEUROLOGY | Age: 84
End: 2019-01-01

## 2019-01-01 ENCOUNTER — OFFICE VISIT (OUTPATIENT)
Dept: CARDIOLOGY CLINIC | Age: 84
End: 2019-01-01

## 2019-01-01 ENCOUNTER — HOSPITAL ENCOUNTER (EMERGENCY)
Age: 84
Discharge: HOME | End: 2019-02-19
Attending: EMERGENCY MEDICINE | Admitting: EMERGENCY MEDICINE
Payer: MEDICARE

## 2019-01-01 ENCOUNTER — APPOINTMENT (OUTPATIENT)
Dept: GENERAL RADIOLOGY | Age: 84
End: 2019-01-01
Attending: EMERGENCY MEDICINE
Payer: MEDICARE

## 2019-01-01 ENCOUNTER — HOME HEALTH ADMISSION (OUTPATIENT)
Dept: HOME HEALTH SERVICES | Facility: HOME HEALTH | Age: 84
End: 2019-01-01
Payer: MEDICARE

## 2019-01-01 ENCOUNTER — HOME CARE VISIT (OUTPATIENT)
Dept: HOME HEALTH SERVICES | Facility: HOME HEALTH | Age: 84
End: 2019-01-01

## 2019-01-01 ENCOUNTER — APPOINTMENT (OUTPATIENT)
Dept: CT IMAGING | Age: 84
End: 2019-01-01
Attending: EMERGENCY MEDICINE
Payer: MEDICARE

## 2019-01-01 ENCOUNTER — OFFICE VISIT (OUTPATIENT)
Dept: FAMILY MEDICINE CLINIC | Age: 84
End: 2019-01-01

## 2019-01-01 ENCOUNTER — HOSPITAL ENCOUNTER (EMERGENCY)
Age: 84
Discharge: HOME | End: 2019-05-19
Attending: EMERGENCY MEDICINE
Payer: MEDICARE

## 2019-01-01 VITALS
OXYGEN SATURATION: 95 % | TEMPERATURE: 98.9 F | HEART RATE: 74 BPM | SYSTOLIC BLOOD PRESSURE: 124 MMHG | DIASTOLIC BLOOD PRESSURE: 74 MMHG

## 2019-01-01 VITALS
DIASTOLIC BLOOD PRESSURE: 74 MMHG | OXYGEN SATURATION: 98 % | RESPIRATION RATE: 20 BRPM | SYSTOLIC BLOOD PRESSURE: 143 MMHG | HEART RATE: 71 BPM

## 2019-01-01 VITALS
HEART RATE: 62 BPM | TEMPERATURE: 98.7 F | DIASTOLIC BLOOD PRESSURE: 68 MMHG | OXYGEN SATURATION: 93 % | SYSTOLIC BLOOD PRESSURE: 120 MMHG

## 2019-01-01 VITALS
BODY MASS INDEX: 21.03 KG/M2 | HEART RATE: 58 BPM | SYSTOLIC BLOOD PRESSURE: 118 MMHG | TEMPERATURE: 98 F | DIASTOLIC BLOOD PRESSURE: 82 MMHG | WEIGHT: 134 LBS | HEIGHT: 67 IN | RESPIRATION RATE: 14 BRPM | OXYGEN SATURATION: 96 %

## 2019-01-01 VITALS
DIASTOLIC BLOOD PRESSURE: 78 MMHG | TEMPERATURE: 98.7 F | HEART RATE: 62 BPM | RESPIRATION RATE: 16 BRPM | SYSTOLIC BLOOD PRESSURE: 122 MMHG

## 2019-01-01 VITALS
OXYGEN SATURATION: 98 % | HEART RATE: 72 BPM | DIASTOLIC BLOOD PRESSURE: 65 MMHG | TEMPERATURE: 98.3 F | SYSTOLIC BLOOD PRESSURE: 139 MMHG

## 2019-01-01 VITALS
DIASTOLIC BLOOD PRESSURE: 80 MMHG | TEMPERATURE: 98.4 F | SYSTOLIC BLOOD PRESSURE: 130 MMHG | OXYGEN SATURATION: 93 % | HEART RATE: 72 BPM

## 2019-01-01 VITALS
RESPIRATION RATE: 16 BRPM | WEIGHT: 140 LBS | SYSTOLIC BLOOD PRESSURE: 110 MMHG | DIASTOLIC BLOOD PRESSURE: 82 MMHG | TEMPERATURE: 98.5 F | HEIGHT: 67 IN | BODY MASS INDEX: 21.97 KG/M2 | HEART RATE: 66 BPM

## 2019-01-01 VITALS
HEIGHT: 67 IN | DIASTOLIC BLOOD PRESSURE: 72 MMHG | HEART RATE: 65 BPM | OXYGEN SATURATION: 97 % | SYSTOLIC BLOOD PRESSURE: 108 MMHG | RESPIRATION RATE: 16 BRPM | WEIGHT: 136 LBS | BODY MASS INDEX: 21.35 KG/M2 | TEMPERATURE: 98.3 F

## 2019-01-01 VITALS
TEMPERATURE: 98.9 F | HEART RATE: 88 BPM | DIASTOLIC BLOOD PRESSURE: 76 MMHG | RESPIRATION RATE: 16 BRPM | SYSTOLIC BLOOD PRESSURE: 122 MMHG | OXYGEN SATURATION: 97 %

## 2019-01-01 VITALS
TEMPERATURE: 98 F | HEIGHT: 67 IN | OXYGEN SATURATION: 96 % | WEIGHT: 134 LBS | SYSTOLIC BLOOD PRESSURE: 118 MMHG | DIASTOLIC BLOOD PRESSURE: 82 MMHG | HEART RATE: 58 BPM | BODY MASS INDEX: 21.03 KG/M2 | RESPIRATION RATE: 14 BRPM

## 2019-01-01 VITALS
DIASTOLIC BLOOD PRESSURE: 78 MMHG | TEMPERATURE: 98.3 F | OXYGEN SATURATION: 99 % | SYSTOLIC BLOOD PRESSURE: 122 MMHG | HEART RATE: 69 BPM

## 2019-01-01 VITALS
SYSTOLIC BLOOD PRESSURE: 108 MMHG | BODY MASS INDEX: 21.93 KG/M2 | HEART RATE: 60 BPM | HEIGHT: 67 IN | RESPIRATION RATE: 14 BRPM | TEMPERATURE: 98.1 F | DIASTOLIC BLOOD PRESSURE: 80 MMHG

## 2019-01-01 VITALS
SYSTOLIC BLOOD PRESSURE: 136 MMHG | HEART RATE: 71 BPM | TEMPERATURE: 98.3 F | OXYGEN SATURATION: 100 % | HEART RATE: 72 BPM | TEMPERATURE: 98.8 F | DIASTOLIC BLOOD PRESSURE: 88 MMHG

## 2019-01-01 VITALS
SYSTOLIC BLOOD PRESSURE: 166 MMHG | HEIGHT: 67 IN | HEART RATE: 62 BPM | DIASTOLIC BLOOD PRESSURE: 88 MMHG | BODY MASS INDEX: 21.35 KG/M2 | WEIGHT: 136 LBS

## 2019-01-01 VITALS
OXYGEN SATURATION: 100 % | HEART RATE: 58 BPM | SYSTOLIC BLOOD PRESSURE: 109 MMHG | DIASTOLIC BLOOD PRESSURE: 49 MMHG | RESPIRATION RATE: 17 BRPM | TEMPERATURE: 98.9 F

## 2019-01-01 VITALS — HEART RATE: 62 BPM | DIASTOLIC BLOOD PRESSURE: 85 MMHG | SYSTOLIC BLOOD PRESSURE: 111 MMHG | OXYGEN SATURATION: 100 %

## 2019-01-01 VITALS
DIASTOLIC BLOOD PRESSURE: 70 MMHG | OXYGEN SATURATION: 97 % | SYSTOLIC BLOOD PRESSURE: 110 MMHG | HEART RATE: 64 BPM | TEMPERATURE: 98 F

## 2019-01-01 VITALS
DIASTOLIC BLOOD PRESSURE: 82 MMHG | SYSTOLIC BLOOD PRESSURE: 136 MMHG | OXYGEN SATURATION: 97 % | HEART RATE: 72 BPM | TEMPERATURE: 98.7 F

## 2019-01-01 VITALS
DIASTOLIC BLOOD PRESSURE: 70 MMHG | RESPIRATION RATE: 16 BRPM | TEMPERATURE: 97.5 F | OXYGEN SATURATION: 96 % | SYSTOLIC BLOOD PRESSURE: 118 MMHG | HEART RATE: 72 BPM

## 2019-01-01 VITALS
TEMPERATURE: 98.8 F | HEART RATE: 75 BPM | OXYGEN SATURATION: 98 % | DIASTOLIC BLOOD PRESSURE: 72 MMHG | SYSTOLIC BLOOD PRESSURE: 118 MMHG

## 2019-01-01 VITALS
HEART RATE: 88 BPM | SYSTOLIC BLOOD PRESSURE: 132 MMHG | TEMPERATURE: 99 F | OXYGEN SATURATION: 99 % | DIASTOLIC BLOOD PRESSURE: 84 MMHG

## 2019-01-01 DIAGNOSIS — K21.00 GASTROESOPHAGEAL REFLUX DISEASE WITH ESOPHAGITIS: ICD-10-CM

## 2019-01-01 DIAGNOSIS — E78.00 HYPERCHOLESTEROLEMIA: ICD-10-CM

## 2019-01-01 DIAGNOSIS — K21.9 GASTROESOPHAGEAL REFLUX DISEASE, ESOPHAGITIS PRESENCE NOT SPECIFIED: ICD-10-CM

## 2019-01-01 DIAGNOSIS — F02.818 DEMENTIA ASSOCIATED WITH OTHER UNDERLYING DISEASE WITH BEHAVIORAL DISTURBANCE: ICD-10-CM

## 2019-01-01 DIAGNOSIS — Z76.0 MEDICATION REFILL: ICD-10-CM

## 2019-01-01 DIAGNOSIS — I10 ESSENTIAL HYPERTENSION: ICD-10-CM

## 2019-01-01 DIAGNOSIS — I10 ESSENTIAL HYPERTENSION: Primary | ICD-10-CM

## 2019-01-01 DIAGNOSIS — Z13.31 SCREENING FOR DEPRESSION: ICD-10-CM

## 2019-01-01 DIAGNOSIS — R00.1 BRADYCARDIA: ICD-10-CM

## 2019-01-01 DIAGNOSIS — Z23 ENCOUNTER FOR IMMUNIZATION: ICD-10-CM

## 2019-01-01 DIAGNOSIS — R55 SYNCOPE AND COLLAPSE: Primary | ICD-10-CM

## 2019-01-01 DIAGNOSIS — L89.311 PRESSURE INJURY OF RIGHT BUTTOCK, STAGE 1: Primary | ICD-10-CM

## 2019-01-01 DIAGNOSIS — F03.91 DEMENTIA WITH BEHAVIORAL DISTURBANCE, UNSPECIFIED DEMENTIA TYPE: ICD-10-CM

## 2019-01-01 DIAGNOSIS — N91.2 AMENIA: ICD-10-CM

## 2019-01-01 DIAGNOSIS — E11.9 TYPE 2 DIABETES MELLITUS WITHOUT COMPLICATION, WITHOUT LONG-TERM CURRENT USE OF INSULIN (HCC): ICD-10-CM

## 2019-01-01 DIAGNOSIS — E78.5 HYPERLIPIDEMIA, UNSPECIFIED HYPERLIPIDEMIA TYPE: ICD-10-CM

## 2019-01-01 DIAGNOSIS — F02.818 DEMENTIA ASSOCIATED WITH OTHER UNDERLYING DISEASE WITH BEHAVIORAL DISTURBANCE: Primary | ICD-10-CM

## 2019-01-01 DIAGNOSIS — R56.9 SEIZURE-LIKE ACTIVITY (HCC): ICD-10-CM

## 2019-01-01 DIAGNOSIS — L89.319 PRESSURE INJURY OF SKIN OF RIGHT BUTTOCK, UNSPECIFIED INJURY STAGE: Primary | ICD-10-CM

## 2019-01-01 DIAGNOSIS — H40.1190 PRIMARY OPEN ANGLE GLAUCOMA, UNSPECIFIED GLAUCOMA STAGE, UNSPECIFIED LATERALITY: ICD-10-CM

## 2019-01-01 DIAGNOSIS — R55 SYNCOPE, UNSPECIFIED SYNCOPE TYPE: ICD-10-CM

## 2019-01-01 DIAGNOSIS — E11.9 TYPE 2 DIABETES MELLITUS WITHOUT COMPLICATION, WITHOUT LONG-TERM CURRENT USE OF INSULIN (HCC): Primary | ICD-10-CM

## 2019-01-01 DIAGNOSIS — Z00.00 MEDICARE ANNUAL WELLNESS VISIT, SUBSEQUENT: ICD-10-CM

## 2019-01-01 DIAGNOSIS — E78.5 HYPERLIPIDEMIA, UNSPECIFIED HYPERLIPIDEMIA TYPE: Primary | ICD-10-CM

## 2019-01-01 DIAGNOSIS — L89.311 PRESSURE INJURY OF RIGHT BUTTOCK, STAGE 1: ICD-10-CM

## 2019-01-01 DIAGNOSIS — R41.3 MEMORY LOSS: ICD-10-CM

## 2019-01-01 DIAGNOSIS — Z71.89 ADVANCED DIRECTIVES, COUNSELING/DISCUSSION: ICD-10-CM

## 2019-01-01 LAB
ALBUMIN SERPL-MCNC: 3.4 G/DL (ref 3.4–5)
ALBUMIN SERPL-MCNC: 3.5 G/DL (ref 3.4–5)
ALBUMIN/GLOB SERPL: 0.8 {RATIO} (ref 0.8–1.7)
ALBUMIN/GLOB SERPL: 0.9 {RATIO} (ref 0.8–1.7)
ALP SERPL-CCNC: 68 U/L (ref 45–117)
ALP SERPL-CCNC: 69 U/L (ref 45–117)
ALT SERPL-CCNC: 17 U/L (ref 13–56)
ALT SERPL-CCNC: 21 U/L (ref 13–56)
ANION GAP SERPL CALC-SCNC: 3 MMOL/L (ref 3–18)
ANION GAP SERPL CALC-SCNC: 5 MMOL/L (ref 3–18)
APAP SERPL-MCNC: <2 UG/ML (ref 10–30)
AST SERPL-CCNC: 20 U/L (ref 15–37)
AST SERPL-CCNC: 35 U/L (ref 15–37)
ATRIAL RATE: 53 BPM
ATRIAL RATE: 63 BPM
BACTERIA SPEC CULT: NORMAL
BACTERIA SPEC CULT: NORMAL
BASOPHILS # BLD: 0 K/UL (ref 0–0.1)
BASOPHILS # BLD: 0 K/UL (ref 0–0.1)
BASOPHILS NFR BLD: 0 % (ref 0–2)
BASOPHILS NFR BLD: 0 % (ref 0–2)
BILIRUB SERPL-MCNC: 0.3 MG/DL (ref 0.2–1)
BILIRUB SERPL-MCNC: 0.3 MG/DL (ref 0.2–1)
BNP SERPL-MCNC: 332 PG/ML (ref 0–1800)
BUN SERPL-MCNC: 21 MG/DL (ref 7–18)
BUN SERPL-MCNC: 30 MG/DL (ref 7–18)
BUN/CREAT SERPL: 33 (ref 12–20)
BUN/CREAT SERPL: 34 (ref 12–20)
CALCIUM SERPL-MCNC: 9.2 MG/DL (ref 8.5–10.1)
CALCIUM SERPL-MCNC: 9.4 MG/DL (ref 8.5–10.1)
CALCULATED P AXIS, ECG09: 60 DEGREES
CALCULATED R AXIS, ECG10: -60 DEGREES
CALCULATED R AXIS, ECG10: -62 DEGREES
CALCULATED T AXIS, ECG11: -74 DEGREES
CALCULATED T AXIS, ECG11: 137 DEGREES
CHLORIDE SERPL-SCNC: 109 MMOL/L (ref 100–108)
CHLORIDE SERPL-SCNC: 110 MMOL/L (ref 100–108)
CHOLEST SERPL-MCNC: 191 MG/DL
CK MB CFR SERPL CALC: 2.7 % (ref 0–4)
CK MB CFR SERPL CALC: 3.7 % (ref 0–4)
CK MB SERPL-MCNC: 3 NG/ML (ref 5–25)
CK MB SERPL-MCNC: 9.6 NG/ML (ref 5–25)
CK SERPL-CCNC: 110 U/L (ref 26–192)
CK SERPL-CCNC: 262 U/L (ref 26–192)
CO2 SERPL-SCNC: 29 MMOL/L (ref 21–32)
CO2 SERPL-SCNC: 29 MMOL/L (ref 21–32)
CREAT SERPL-MCNC: 0.64 MG/DL (ref 0.6–1.3)
CREAT SERPL-MCNC: 0.89 MG/DL (ref 0.6–1.3)
DIAGNOSIS, 93000: NORMAL
DIAGNOSIS, 93000: NORMAL
DIFFERENTIAL METHOD BLD: ABNORMAL
DIFFERENTIAL METHOD BLD: ABNORMAL
EOSINOPHIL # BLD: 0 K/UL (ref 0–0.4)
EOSINOPHIL # BLD: 0 K/UL (ref 0–0.4)
EOSINOPHIL NFR BLD: 0 % (ref 0–5)
EOSINOPHIL NFR BLD: 0 % (ref 0–5)
ERYTHROCYTE [DISTWIDTH] IN BLOOD BY AUTOMATED COUNT: 13.1 % (ref 11.6–14.5)
ERYTHROCYTE [DISTWIDTH] IN BLOOD BY AUTOMATED COUNT: 13.3 % (ref 11.6–14.5)
ETHANOL SERPL-MCNC: <3 MG/DL (ref 0–3)
GLOBULIN SER CALC-MCNC: 4 G/DL (ref 2–4)
GLOBULIN SER CALC-MCNC: 4.2 G/DL (ref 2–4)
GLUCOSE SERPL-MCNC: 102 MG/DL (ref 74–99)
GLUCOSE SERPL-MCNC: 75 MG/DL (ref 74–99)
HBA1C MFR BLD HPLC: 4.8 %
HBA1C MFR BLD: 4.9 % (ref 4.2–5.6)
HCT VFR BLD AUTO: 30.2 % (ref 35–45)
HCT VFR BLD AUTO: 34.3 % (ref 35–45)
HDLC SERPL-MCNC: 85 MG/DL (ref 40–60)
HDLC SERPL: 2.2 {RATIO} (ref 0–5)
HGB BLD-MCNC: 10.3 G/DL (ref 12–16)
HGB BLD-MCNC: 11.5 G/DL (ref 12–16)
LACTATE BLD-SCNC: 1.11 MMOL/L (ref 0.4–2)
LDLC SERPL CALC-MCNC: 94.6 MG/DL (ref 0–100)
LIPID PROFILE,FLP: ABNORMAL
LYMPHOCYTES # BLD: 0.4 K/UL (ref 0.9–3.6)
LYMPHOCYTES # BLD: 0.8 K/UL (ref 0.9–3.6)
LYMPHOCYTES NFR BLD: 12 % (ref 21–52)
LYMPHOCYTES NFR BLD: 5 % (ref 21–52)
MAGNESIUM SERPL-MCNC: 2.2 MG/DL (ref 1.6–2.6)
MCH RBC QN AUTO: 31.3 PG (ref 24–34)
MCH RBC QN AUTO: 31.8 PG (ref 24–34)
MCHC RBC AUTO-ENTMCNC: 33.5 G/DL (ref 31–37)
MCHC RBC AUTO-ENTMCNC: 34.1 G/DL (ref 31–37)
MCV RBC AUTO: 93.2 FL (ref 74–97)
MCV RBC AUTO: 93.2 FL (ref 74–97)
MONOCYTES # BLD: 0.3 K/UL (ref 0.05–1.2)
MONOCYTES # BLD: 0.5 K/UL (ref 0.05–1.2)
MONOCYTES NFR BLD: 4 % (ref 3–10)
MONOCYTES NFR BLD: 6 % (ref 3–10)
NEUTS SEG # BLD: 5.6 K/UL (ref 1.8–8)
NEUTS SEG # BLD: 7.3 K/UL (ref 1.8–8)
NEUTS SEG NFR BLD: 84 % (ref 40–73)
NEUTS SEG NFR BLD: 89 % (ref 40–73)
P-R INTERVAL, ECG05: 182 MS
PLATELET # BLD AUTO: 178 K/UL (ref 135–420)
PLATELET # BLD AUTO: 191 K/UL (ref 135–420)
PMV BLD AUTO: 11.4 FL (ref 9.2–11.8)
PMV BLD AUTO: 11.7 FL (ref 9.2–11.8)
POTASSIUM SERPL-SCNC: 4.2 MMOL/L (ref 3.5–5.5)
POTASSIUM SERPL-SCNC: 4.2 MMOL/L (ref 3.5–5.5)
PROT SERPL-MCNC: 7.4 G/DL (ref 6.4–8.2)
PROT SERPL-MCNC: 7.7 G/DL (ref 6.4–8.2)
Q-T INTERVAL, ECG07: 442 MS
Q-T INTERVAL, ECG07: 500 MS
QRS DURATION, ECG06: 138 MS
QRS DURATION, ECG06: 144 MS
QTC CALCULATION (BEZET), ECG08: 463 MS
QTC CALCULATION (BEZET), ECG08: 469 MS
RBC # BLD AUTO: 3.24 M/UL (ref 4.2–5.3)
RBC # BLD AUTO: 3.68 M/UL (ref 4.2–5.3)
SALICYLATES SERPL-MCNC: <1.7 MG/DL (ref 2.8–20)
SERVICE CMNT-IMP: NORMAL
SERVICE CMNT-IMP: NORMAL
SODIUM SERPL-SCNC: 142 MMOL/L (ref 136–145)
SODIUM SERPL-SCNC: 143 MMOL/L (ref 136–145)
TRIGL SERPL-MCNC: 57 MG/DL (ref ?–150)
TROPONIN I SERPL-MCNC: <0.02 NG/ML (ref 0–0.04)
TROPONIN I SERPL-MCNC: <0.02 NG/ML (ref 0–0.04)
TSH SERPL DL<=0.05 MIU/L-ACNC: 1.5 UIU/ML (ref 0.36–3.74)
VENTRICULAR RATE, ECG03: 53 BPM
VENTRICULAR RATE, ECG03: 66 BPM
VIT B12 SERPL-MCNC: 388 PG/ML (ref 211–911)
VLDLC SERPL CALC-MCNC: 11.4 MG/DL
WBC # BLD AUTO: 6.8 K/UL (ref 4.6–13.2)
WBC # BLD AUTO: 8.2 K/UL (ref 4.6–13.2)

## 2019-01-01 PROCEDURE — 3331090002 HH PPS REVENUE DEBIT

## 2019-01-01 PROCEDURE — 3331090001 HH PPS REVENUE CREDIT

## 2019-01-01 PROCEDURE — G0299 HHS/HOSPICE OF RN EA 15 MIN: HCPCS

## 2019-01-01 PROCEDURE — A6212 FOAM DRG <=16 SQ IN W/BORDER: HCPCS

## 2019-01-01 PROCEDURE — G0300 HHS/HOSPICE OF LPN EA 15 MIN: HCPCS

## 2019-01-01 PROCEDURE — G0157 HHC PT ASSISTANT EA 15: HCPCS

## 2019-01-01 PROCEDURE — 70450 CT HEAD/BRAIN W/O DYE: CPT

## 2019-01-01 PROCEDURE — 74011250636 HC RX REV CODE- 250/636: Performed by: EMERGENCY MEDICINE

## 2019-01-01 PROCEDURE — A5120 SKIN BARRIER, WIPE OR SWAB: HCPCS

## 2019-01-01 PROCEDURE — 87040 BLOOD CULTURE FOR BACTERIA: CPT

## 2019-01-01 PROCEDURE — 400013 HH SOC

## 2019-01-01 PROCEDURE — 83735 ASSAY OF MAGNESIUM: CPT

## 2019-01-01 PROCEDURE — 36415 COLL VENOUS BLD VENIPUNCTURE: CPT

## 2019-01-01 PROCEDURE — 84443 ASSAY THYROID STIM HORMONE: CPT

## 2019-01-01 PROCEDURE — 74011250637 HC RX REV CODE- 250/637: Performed by: EMERGENCY MEDICINE

## 2019-01-01 PROCEDURE — 82607 VITAMIN B-12: CPT

## 2019-01-01 PROCEDURE — 96360 HYDRATION IV INFUSION INIT: CPT

## 2019-01-01 PROCEDURE — 80053 COMPREHEN METABOLIC PANEL: CPT

## 2019-01-01 PROCEDURE — 82550 ASSAY OF CK (CPK): CPT

## 2019-01-01 PROCEDURE — A6250 SKIN SEAL PROTECT MOISTURIZR: HCPCS

## 2019-01-01 PROCEDURE — 80061 LIPID PANEL: CPT

## 2019-01-01 PROCEDURE — 83605 ASSAY OF LACTIC ACID: CPT

## 2019-01-01 PROCEDURE — 71045 X-RAY EXAM CHEST 1 VIEW: CPT

## 2019-01-01 PROCEDURE — 83036 HEMOGLOBIN GLYCOSYLATED A1C: CPT

## 2019-01-01 PROCEDURE — 93005 ELECTROCARDIOGRAM TRACING: CPT

## 2019-01-01 PROCEDURE — 83880 ASSAY OF NATRIURETIC PEPTIDE: CPT

## 2019-01-01 PROCEDURE — 99284 EMERGENCY DEPT VISIT MOD MDM: CPT

## 2019-01-01 PROCEDURE — 85025 COMPLETE CBC W/AUTO DIFF WBC: CPT

## 2019-01-01 PROCEDURE — G0151 HHCP-SERV OF PT,EA 15 MIN: HCPCS

## 2019-01-01 PROCEDURE — A4927 NON-STERILE GLOVES: HCPCS

## 2019-01-01 PROCEDURE — A4452 WATERPROOF TAPE: HCPCS

## 2019-01-01 PROCEDURE — A6216 NON-STERILE GAUZE<=16 SQ IN: HCPCS

## 2019-01-01 PROCEDURE — 99285 EMERGENCY DEPT VISIT HI MDM: CPT

## 2019-01-01 PROCEDURE — A6260 WOUND CLEANSER ANY TYPE/SIZE: HCPCS

## 2019-01-01 PROCEDURE — 80307 DRUG TEST PRSMV CHEM ANLYZR: CPT

## 2019-01-01 RX ORDER — LISINOPRIL 5 MG/1
TABLET ORAL
Qty: 90 TAB | Refills: 0 | Status: SHIPPED | OUTPATIENT
Start: 2019-01-01 | End: 2019-01-01 | Stop reason: SDUPTHER

## 2019-01-01 RX ORDER — MEMANTINE HYDROCHLORIDE 10 MG/1
10 TABLET ORAL DAILY
Qty: 90 TAB | Refills: 5 | Status: SHIPPED | OUTPATIENT
Start: 2019-01-01 | End: 2020-01-01

## 2019-01-01 RX ORDER — RANITIDINE 150 MG/1
150 TABLET, FILM COATED ORAL
Qty: 180 TAB | Refills: 0 | Status: SHIPPED | OUTPATIENT
Start: 2019-01-01 | End: 2019-01-01 | Stop reason: SDUPTHER

## 2019-01-01 RX ORDER — RANITIDINE 150 MG/1
150 TABLET, FILM COATED ORAL 2 TIMES DAILY
Qty: 180 TAB | Refills: 0 | Status: SHIPPED | OUTPATIENT
Start: 2019-01-01 | End: 2020-01-01

## 2019-01-01 RX ORDER — FERROUS SULFATE 324(65)MG
325 TABLET, DELAYED RELEASE (ENTERIC COATED) ORAL
Qty: 90 TAB | Refills: 1 | Status: SHIPPED | OUTPATIENT
Start: 2019-01-01 | End: 2019-01-01

## 2019-01-01 RX ORDER — LISINOPRIL 5 MG/1
5 TABLET ORAL DAILY
Qty: 90 TAB | Refills: 0 | Status: SHIPPED | OUTPATIENT
Start: 2019-01-01 | End: 2019-01-01

## 2019-01-01 RX ORDER — ATORVASTATIN CALCIUM 80 MG/1
TABLET, FILM COATED ORAL
Qty: 90 TAB | Refills: 1 | Status: CANCELLED | OUTPATIENT
Start: 2019-01-01

## 2019-01-01 RX ORDER — RANITIDINE 150 MG/1
TABLET, FILM COATED ORAL
Qty: 180 TAB | Refills: 0 | Status: SHIPPED | OUTPATIENT
Start: 2019-01-01 | End: 2019-01-01 | Stop reason: SDUPTHER

## 2019-01-01 RX ORDER — LISINOPRIL 5 MG/1
5 TABLET ORAL DAILY
Qty: 90 TAB | Refills: 0 | Status: SHIPPED | OUTPATIENT
Start: 2019-01-01 | End: 2019-01-01 | Stop reason: SDUPTHER

## 2019-01-01 RX ORDER — MEMANTINE HYDROCHLORIDE 10 MG/1
TABLET ORAL
Qty: 90 TAB | Refills: 4 | Status: SHIPPED | OUTPATIENT
Start: 2019-01-01 | End: 2019-01-01 | Stop reason: SDUPTHER

## 2019-01-01 RX ORDER — ATORVASTATIN CALCIUM 80 MG/1
TABLET, FILM COATED ORAL
Qty: 90 TAB | Refills: 1 | Status: SHIPPED | OUTPATIENT
Start: 2019-01-01 | End: 2019-01-01 | Stop reason: SDUPTHER

## 2019-01-01 RX ORDER — ACETAMINOPHEN 325 MG/1
650 TABLET ORAL
Status: COMPLETED | OUTPATIENT
Start: 2019-01-01 | End: 2019-01-01

## 2019-01-01 RX ORDER — ATORVASTATIN CALCIUM 80 MG/1
TABLET, FILM COATED ORAL
Qty: 90 TAB | Refills: 3 | Status: SHIPPED | OUTPATIENT
Start: 2019-01-01 | End: 2020-01-01

## 2019-01-01 RX ORDER — LISINOPRIL 5 MG/1
TABLET ORAL
Qty: 90 TAB | Refills: 1 | Status: SHIPPED | OUTPATIENT
Start: 2019-01-01 | End: 2020-01-01

## 2019-01-01 RX ORDER — SODIUM CHLORIDE 0.9 % (FLUSH) 0.9 %
5-10 SYRINGE (ML) INJECTION AS NEEDED
Status: DISCONTINUED | OUTPATIENT
Start: 2019-01-01 | End: 2019-01-01 | Stop reason: HOSPADM

## 2019-01-01 RX ADMIN — ACETAMINOPHEN 650 MG: 325 TABLET ORAL at 23:54

## 2019-01-01 RX ADMIN — SODIUM CHLORIDE 500 ML: 900 INJECTION, SOLUTION INTRAVENOUS at 20:59

## 2019-01-01 RX ADMIN — SODIUM CHLORIDE 1000 ML: 900 INJECTION, SOLUTION INTRAVENOUS at 20:10

## 2019-02-13 NOTE — TELEPHONE ENCOUNTER
Refill request for Zantac and Lisinoopril. Last refill on Zantac on 12/11/2018 #180 w 0 refills and Lisinopril 10/15/2018 #90 w 1 refill refills. Last office visit 11/16/2018. Next appointment on 04/15/2019. Requested pharmacy [facility] .  Refill request sent to provider for approval or denial.

## 2019-02-14 NOTE — TELEPHONE ENCOUNTER
Called home number. Verified name and . Spoke with daughter. Per daughter patient is on iron medication and she has been buying out of pocket due to insurance was not covering. Per daughter would like to request refill due to change in insurance. Daughter had no further questions or concerns.     Refill request sent to provider for approval or denial.

## 2019-02-19 NOTE — DISCHARGE INSTRUCTIONS
Patient Education        Fainting: Care Instructions  Your Care Instructions    When you faint, or pass out, you lose consciousness for a short time. A brief drop in blood flow to the brain often causes it. When you fall or lie down, more blood flows to your brain and you regain consciousness. Emotional stress, pain, or overheating--especially if you have been standing--can make you faint. In these cases, fainting is usually not serious. But fainting can be a sign of a more serious problem. Your doctor may want you to have more tests to rule out other causes. The treatment you need depends on the reason why you fainted. The doctor has checked you carefully, but problems can develop later. If you notice any problems or new symptoms, get medical treatment right away. Follow-up care is a key part of your treatment and safety. Be sure to make and go to all appointments, and call your doctor if you are having problems. It's also a good idea to know your test results and keep a list of the medicines you take. How can you care for yourself at home? · Drink plenty of fluids to prevent dehydration. If you have kidney, heart, or liver disease and have to limit fluids, talk with your doctor before you increase your fluid intake. When should you call for help? Call 911 anytime you think you may need emergency care. For example, call if:    · You have symptoms of a heart problem. These may include:  ? Chest pain or pressure. ? Severe trouble breathing. ? A fast or irregular heartbeat. ? Lightheadedness or sudden weakness. ? Coughing up pink, foamy mucus. ? Passing out. After you call 911, the  may tell you to chew 1 adult-strength or 2 to 4 low-dose aspirin. Wait for an ambulance. Do not try to drive yourself.     · You have symptoms of a stroke. These may include:  ? Sudden numbness, tingling, weakness, or loss of movement in your face, arm, or leg, especially on only one side of your body.   ? Sudden vision changes. ? Sudden trouble speaking. ? Sudden confusion or trouble understanding simple statements. ? Sudden problems with walking or balance. ? A sudden, severe headache that is different from past headaches.     · You passed out (lost consciousness) again.    Watch closely for changes in your health, and be sure to contact your doctor if:    · You do not get better as expected. Where can you learn more? Go to http://michell-everardo.info/. Enter F789 in the search box to learn more about \"Fainting: Care Instructions. \"  Current as of: September 23, 2018  Content Version: 11.9  © 6521-1975 [employer]. Care instructions adapted under license by [employer] (which disclaims liability or warranty for this information). If you have questions about a medical condition or this instruction, always ask your healthcare professional. [address] any warranty or liability for your use of this information.

## 2019-02-19 NOTE — ED PROVIDER NOTES
EMERGENCY DEPARTMENT HISTORY AND PHYSICAL EXAM 
 
8:51 PM 
 
 
Date: 2/18/2019 Patient Name: Sigifredo Hernandez History of Presenting Illness No chief complaint on file. History Provided By: Patient's Daughter Additional History (Context): Sigifredo Hernandez is a 80 y.o. female with a PMHx of HTN, DM, ACS, breast ca & dementia who presents to the ED with c/o acute, moderate AMS secondary to a syncopal episode that occurred less than 1 hour PTA. Pt's daughter at bedsides is serving as primary historian as pt is A&O to self only at baseline. Daughter states that the patient was at baseline prior to today, denies any recent illness. She says that the pt did not feel well today prior to the syncopal episode this evening. The pt had a BM this evening and the daughter states that she walked to the couch after her BM and fainted. She has a drug allergy to Penicillins. No smoking, etoh or drug use. Associated sx include syncope. Denies any fever, chills, CP, SOB, abdominal pain, nausea, vomiting, diarrhea and any urinary sx. No further concerns or complaints at this time. PCP: DIAN Parkinson Chief Complaint: AMS Duration:  Less than 1 hour PTA Timing:  Acute Location: Neuro Severity: Moderate Modifying Factors: None. Associated Symptoms: syncope Current Outpatient Medications Medication Sig Dispense Refill  ferrous sulfate 324 mg (65 mg iron) tablet Take 1 Tab by mouth Daily (before breakfast). 90 Tab 1  
 lisinopril (PRINIVIL, ZESTRIL) 5 mg tablet Take 1 Tab by mouth daily. 90 Tab 0  
 raNITIdine (ZANTAC) 150 mg tablet Take 1 Tab by mouth two (2) times daily as needed for Indigestion. 180 Tab 0  
 atorvastatin (LIPITOR) 80 mg tablet take 1 tablet by mouth once daily at bedtime 90 Tab 1  
 memantine (NAMENDA) 10 mg tablet 1 q am 90 Tab 4  
 hydrocortisone (ALA-DEEDEE) 1 % lotion Apply  to affected area two (2) times a day.  use thin layer 60 g 2  
  CALCIUM 600 600 mg calcium (1,500 mg) tablet take 1 tablet by mouth twice a day 60 Tab 6  
 acetaminophen (TYLENOL EXTRA STRENGTH) 500 mg tablet Take  by mouth every six (6) hours as needed for Pain.  dorzolamide (TRUSOPT) 2 % ophthalmic solution Administer 2 Drops to both eyes three (3) times daily.  aspirin delayed-release 81 mg tablet take 1 tablet by mouth once daily 30 Tab 6  
 latanoprost (XALATAN) 0.005 % ophthalmic solution Administer 1 Drop to both eyes nightly.  multivitamin (DAILY MULTIPLE) tablet Take 1 Tab by mouth daily. 30 Tab 6  peg 400-propylene glycol (SYSTANE) 0.4-0.3 % drop Administer  to left eye as needed. Past History Past Medical History: 
Past Medical History:  
Diagnosis Date  ACS (acute coronary syndrome) ([facility] [address] NSTEMI  Arthritis  Breast cancer [facility]) 2007  
 right breast (radiation)  Dementia  Diabetes ([facility] [address] diet controlled  GERD (gastroesophageal reflux disease)  Glaucoma  Hypertension Past Surgical History: 
Past Surgical History:  
Procedure Laterality Date  HX APPENDECTOMY  HX BREAST BIOPSY  HX CHOLECYSTECTOMY Family History: 
Family History Problem Relation Age of Onset  Cancer Mother  Cancer Father  Diabetes Sister  Diabetes Paternal Grandmother  Cancer Other Social History: 
Social History Tobacco Use  Smoking status: Never Smoker  Smokeless tobacco: Never Used Substance Use Topics  Alcohol use: No  
 Drug use: No  
 
 
Allergies: Allergies Allergen Reactions  Pcn [Penicillins] Other (comments) Unknown-happened when she was very young Review of Systems Review of Systems Constitutional: Negative for activity change and appetite change. HENT: Negative for congestion. Eyes: Negative for visual disturbance. Respiratory: Negative for cough and shortness of breath. Cardiovascular: Negative for chest pain. Gastrointestinal: Negative for abdominal pain, diarrhea, nausea and vomiting. Genitourinary: Negative for dysuria. Musculoskeletal: Negative for arthralgias and myalgias. Skin: Negative for rash. Neurological: Positive for syncope. Negative for weakness and numbness. Psychiatric/Behavioral:  
     Positive for AMS Physical Exam  
 
Visit Vitals /49 Pulse (!) 58 Temp 98.9 °F (37.2 °C) Resp 17 SpO2 100% Physical Exam  
Constitutional: She is oriented to person, place, and time. She appears well-developed and well-nourished. Oriented to self only but alert HENT:  
Head: Normocephalic and atraumatic. Mouth/Throat: Oropharynx is clear and moist.  
Eyes: Conjunctivae are normal.  
Neck: Normal range of motion. Neck supple. No JVD present. Cardiovascular: Normal rate, regular rhythm, normal heart sounds and intact distal pulses. No murmur heard. Pulmonary/Chest: Effort normal and breath sounds normal.  
Abdominal: Soft. Bowel sounds are normal. She exhibits no distension. There is no tenderness. Musculoskeletal: Normal range of motion. She exhibits no deformity. Lymphadenopathy:  
  She has no cervical adenopathy. Neurological: She is alert and oriented to person, place, and time. Coordination normal.  
Skin: Skin is warm and dry. No rash noted. Psychiatric: She has a normal mood and affect. Nursing note and vitals reviewed. Diagnostic Study Results Labs - Recent Results (from the past 12 hour(s)) CBC WITH AUTOMATED DIFF Collection Time: 02/18/19  9:00 PM  
Result Value Ref Range WBC 6.8 4.6 - 13.2 K/uL  
 RBC 3.24 (L) 4.20 - 5.30 M/uL  
 HGB 10.3 (L) 12.0 - 16.0 g/dL HCT 30.2 (L) 35.0 - 45.0 % MCV 93.2 74.0 - 97.0 FL  
 MCH 31.8 24.0 - 34.0 PG  
 MCHC 34.1 31.0 - 37.0 g/dL  
 RDW 13.1 11.6 - 14.5 % PLATELET 496 600 - 127 K/uL MPV 11.7 9.2 - 11.8 FL  
 NEUTROPHILS 84 (H) 40 - 73 % LYMPHOCYTES 12 (L) 21 - 52 % MONOCYTES 4 3 - 10 % EOSINOPHILS 0 0 - 5 % BASOPHILS 0 0 - 2 %  
 ABS. NEUTROPHILS 5.6 1.8 - 8.0 K/UL  
 ABS. LYMPHOCYTES 0.8 (L) 0.9 - 3.6 K/UL  
 ABS. MONOCYTES 0.3 0.05 - 1.2 K/UL  
 ABS. EOSINOPHILS 0.0 0.0 - 0.4 K/UL  
 ABS. BASOPHILS 0.0 0.0 - 0.1 K/UL  
 DF AUTOMATED CARDIAC PANEL,(CK, CKMB & TROPONIN) Collection Time: 02/18/19  9:00 PM  
Result Value Ref Range  26 - 192 U/L  
 CK - MB 3.0 <3.6 ng/ml CK-MB Index 2.7 0.0 - 4.0 % Troponin-I, QT <0.02 0.0 - 0.045 NG/ML  
NT-PRO BNP Collection Time: 02/18/19  9:00 PM  
Result Value Ref Range NT pro- 0 - 1,800 PG/ML  
EKG, 12 LEAD, INITIAL Collection Time: 02/18/19  9:01 PM  
Result Value Ref Range Ventricular Rate 53 BPM  
 Atrial Rate 53 BPM  
 P-R Interval 182 ms QRS Duration 144 ms Q-T Interval 500 ms QTC Calculation (Bezet) 469 ms Calculated P Axis 60 degrees Calculated R Axis -60 degrees Calculated T Axis -74 degrees Diagnosis Sinus bradycardia Left axis deviation Nonspecific intraventricular block Cannot rule out Anterior infarct (cited on or before 05-NOV-2018) T wave abnormality, consider inferolateral ischemia Abnormal ECG When compared with ECG of 05-NOV-2018 18:24, 
Questionable change in initial forces of Anteroseptal leads T wave inversion less evident in Lateral leads Radiologic Studies -  
CT HEAD WO CONT Final Result IMPRESSION:  
              
1.  Technically limited study. Repeat study may be helpful after the patient's  
condition improves or with better patient motion control can be achieved. 2.  No acute intracranial hemorrhage or significant mass effect. XR CHEST PORT    (Results Pending) Medical Decision Making It should be noted that Rosanna Rushing MD will be the provider of record for this patient.  
 
I reviewed the vital signs, available nursing notes, past medical history, past surgical history, family history and social history. Vital Signs-Reviewed the patient's vital signs. Pulse Oximetry Analysis -  100% on room air (Normal) Cardiac Monitor: 
Rate: 57 Rhythm:  Sinus Bradycardia EKG: Interpreted by the EP. Normal sinus rhythm, rate 53, , QTc 469. Old LBBB, unchanged from 11/5/18. No acute ST or T wave changes, no STEMI. Records Reviewed: Nursing Notes (Time of Review: 8:51 PM) 
 
ED Course: Progress Notes, Reevaluation, and Consults: 
 
Provider Notes (Medical Decision Making): Oniel Hawkins is a 80 y.o. female with a PMHx of HTN, DM, ACS, breast ca & dementia who presents to the ED with AMS secondary to a syncopal episode that occurred less than 1 hour PTA. Pt's daughter at bedsides providing primary historian as pt is A&O to self only at baseline. Daughter states that the patient was at baseline prior to today, denies any recent illness. She says that the pt did not feel well today prior to the syncopal episode this evening. The pt had a BM this evening and the daughter states that she walked to the couch after her BM and fainted. Denies any headaches or light-headedness upon arrival in ED. No further concerns currently. Appears well on exam.  
 
Differential Diagnosis: Likely vasovagal vs orthostatic hypotension. Given history and exam, I have low suspicion for MI, clinically significant aortic stenosis, hypertrophic cardiomyopathy, PE, aortic dissection, lethal dysrhythmia, subarachnoid hemorrhage, TIA/stroke, GI bleed, AAA rupture. [facility] Syncope Score: 0 Testing: Pro-BNP, CBC, EKG, Cardiac Panel, CXR, CT head wo contrast, urinalysis. Treatments: NS bolus All studies unremarkable. The patient will be discharged home. Findings were discussed at length and questions were answered. Information on all newly prescribed medications was given.  the patient was instructed to follow-up with her PCP, or return to the Emergency Department with any worsened symptoms or concerns. Return precautions were given. Diagnosis Clinical Impression: 1. Syncope and collapse Disposition: discharge Follow-up Information Follow up With Specialties Details Why Contact Info DIAN Salguero Physician Assistant Schedule an appointment as soon as possible for a visit  [address] [place] [address] [address] [facility] 
[phone] [facility] EMERGENCY DEPT Emergency Medicine  If symptoms worsen [phone] 
[phone] Medication List  
  
ASK your doctor about these medications   
aspirin delayed-release 81 mg tablet 
take 1 tablet by mouth once daily 
  
atorvastatin 80 mg tablet Commonly known as:  LIPITOR 
take 1 tablet by mouth once daily at bedtime CALCIUM 600 600 mg calcium (1,500 mg) tablet Generic drug:  calcium carbonate 
take 1 tablet by mouth twice a day 
  
dorzolamide 2 % ophthalmic solution Commonly known as:  TRUSOPT 
  
ferrous sulfate 324 mg (65 mg iron) tablet Take 1 Tab by mouth Daily (before breakfast). hydrocortisone 1 % lotion Commonly known as:  ALA-DEEDEE Apply  to affected area two (2) times a day. use thin layer 
  
latanoprost 0.005 % ophthalmic solution Commonly known as:  XALATAN 
  
lisinopril 5 mg tablet Commonly known as:  Therisa Semen Take 1 Tab by mouth daily. memantine 10 mg tablet Commonly known as:  NAMENDA 
1 q am 
  
multivitamin tablet Commonly known as:  DAILY MULTIPLE Take 1 Tab by mouth daily. raNITIdine 150 mg tablet Commonly known as:  ZANTAC Take 1 Tab by mouth two (2) times daily as needed for Indigestion. SYSTANE (PROPYLENE GLYCOL) 0.4-0.3 % Drop Generic drug:  peg 400-propylene glycol TYLENOL EXTRA STRENGTH 500 mg tablet Generic drug:  acetaminophen 
  
  
 
_______________________________ Scribe Attestation Autumn Espinal acting as a scribe for and in the presence of Aruna Gustafson MD     
February 18, 2019 at 8:51 PM 
    
Provider Attestation:     
I personally performed the services described in the documentation, reviewed the documentation, as recorded by the scribe in my presence, and it accurately and completely records my words and actions. February 18, 2019 at 8:51 PM - Aruna Gustafson MD   
 
 
_______________________________

## 2019-02-19 NOTE — ED NOTES
10/29/19        Iram Mercado  [address] [facility]      Dear Anna Gaviria records indicate that you have outstanding lab work and or testing that was ordered for you and has not yet been completed:        Hemoglobin A1C [E]      CMP 6 Tech placed purewick on patient.

## 2019-02-19 NOTE — ED TRIAGE NOTES
Per EMS, pt was having a BM and got up moved to the couch and became unresponsive. Pt is A&O to person, baseline.

## 2019-02-20 NOTE — PROGRESS NOTES
[facility]  [address] [facility] [address], [address], [facility], [address]  [address]. [facility], [address]  Office:  [phone]  Fax: [phone]  Chief Complaint   Patient presents with    Follow Up Chronic Condition     dementia     This is an 59-year-old female who presents for follow-up dementia. Accompanied in office by her family member. Maintained on Namenda 10 mg daily. Previous increase to twice daily dosing resulted in agitation per her daughter. In the interim endorses she was seen in the ER yesterday for passing out. This has happened in the past. Had negative head imaging and cardiac workup. Occasionally she can get agitated when she is bathed by her aid. Otherwise no new concerns. Past Medical History:   Diagnosis Date    ACS (acute coronary syndrome) ([facility]     NSTEMI    Arthritis     Breast cancer ([facility] 2007    right breast (radiation)    Dementia     Diabetes (HCC)     diet controlled    GERD (gastroesophageal reflux disease)     Glaucoma     Hypertension        Past Surgical History:   Procedure Laterality Date    HX APPENDECTOMY      HX BREAST BIOPSY      HX CHOLECYSTECTOMY         Current Outpatient Medications   Medication Sig Dispense Refill    memantine (NAMENDA) 10 mg tablet 1 q am 90 Tab 4    ferrous sulfate 324 mg (65 mg iron) tablet Take 1 Tab by mouth Daily (before breakfast). 90 Tab 1    lisinopril (PRINIVIL, ZESTRIL) 5 mg tablet Take 1 Tab by mouth daily. 90 Tab 0    raNITIdine (ZANTAC) 150 mg tablet Take 1 Tab by mouth two (2) times daily as needed for Indigestion. 180 Tab 0    atorvastatin (LIPITOR) 80 mg tablet take 1 tablet by mouth once daily at bedtime 90 Tab 1    hydrocortisone (ALA-DEEDEE) 1 % lotion Apply  to affected area two (2) times a day.  use thin layer 60 g 2    CALCIUM 600 600 mg calcium (1,500 mg) tablet take 1 tablet by mouth twice a day 60 Tab 6    acetaminophen (TYLENOL EXTRA STRENGTH) 500 mg tablet Take  by mouth every six (6) hours as needed for Pain.  dorzolamide (TRUSOPT) 2 % ophthalmic solution Administer 2 Drops to both eyes three (3) times daily.  aspirin delayed-release 81 mg tablet take 1 tablet by mouth once daily 30 Tab 6    latanoprost (XALATAN) 0.005 % ophthalmic solution Administer 1 Drop to both eyes nightly.  multivitamin (DAILY MULTIPLE) tablet Take 1 Tab by mouth daily. 30 Tab 6    peg 400-propylene glycol (SYSTANE) 0.4-0.3 % drop Administer  to left eye as needed. Allergies   Allergen Reactions    Pcn [Penicillins] Other (comments)     Unknown-happened when she was very young       Social History     Tobacco Use    Smoking status: Never Smoker    Smokeless tobacco: Never Used   Substance Use Topics    Alcohol use: No    Drug use: No       Family History   Problem Relation Age of Onset    Cancer Mother     Cancer Father     Diabetes Sister     Diabetes Paternal Grandmother     Cancer Other        Review of Systems  GENERAL: Denies fever or fatigue  CARDIAC: No CP or SOB  PULMONARY: No cough of SOB  MUSCULOSKELETAL: No new joint pain  NEURO: SEE HPI    Examination  Visit Vitals  /82 (BP 1 Location: Left arm, BP Patient Position: Sitting)   Pulse 66   Temp 98.5 °F (36.9 °C) (Oral)   Resp 16   Ht 5' 7\" (1.702 m)   Wt 63.5 kg (140 lb)   BMI 21.93 kg/m²       This is a very pleasant 80year old female. She is alert to self only. Able to follow simple commands. Maintains in wheelchair. No results found for this or any previous visit (from the past 24 hour(s)). Impression/Plan  Akua Watkins is a 80 y.o. female whose history and physical are consistent with dementia. Maintained on Namenda 10 mg daily. Refills provided. Discussed safety. Follow up in one year or sooner as needed. All questions addressed and family member agreeable with plan of care. Diagnoses and all orders for this visit:    1.  Dementia associated with other underlying disease with behavioral disturbance    2. Memory loss    Other orders  -     memantine (NAMENDA) 10 mg tablet; 1 q am      Total time: 15 min   Counseling / coordination time: 12 min   > 50% counseling / coordination?: Yes re: symptoms, management, medications, safety, answering questions, and plan of care. Signed By: Hayde Toribio NP    This note will not be viewable in [address] PLEASE NOTE:   Portions of this document may have been produced using voice recognition software. Unrecognized errors in transcription may be present.

## 2019-02-20 NOTE — PROGRESS NOTES
Nursing Notes    Patient presents to the office today in follow-up. Patient rates her pain at 0/10 on the numerical pain scale. Any new labs or imaging since last appointment? YES. Pt had a CT scan done of her head while she was in the ER. She also had a chest x-ray done as the same time    Have you been to an emergency room (ER) or urgent care clinic since your last visit? YES. Pt was taken to the ER after she passed out. Have you been hospitalized since your last visit? NO     If yes, where, when, and reason for visit? Have you seen or consulted any other health care providers outside of the [address] [place] [address]  since your last visit? YES     If yes, where, when, and reason for visit? pcp  Ms. Padmini Bah has a reminder for a \"due or due soon\" health maintenance. I have asked that she contact her primary care provider for follow-up on this health maintenance. Abuse Screening Questionnaire 2/20/2019   Do you ever feel afraid of your partner? N   Are you in a relationship with someone who physically or mentally threatens you? N   Is it safe for you to go home?  Nkechi Blanco

## 2019-04-10 NOTE — TELEPHONE ENCOUNTER
This patient contacted office for the following prescriptions to be filled:    Medication requested :   Requested Prescriptions     Pending Prescriptions Disp Refills    lisinopril (PRINIVIL, ZESTRIL) 5 mg tablet 90 Tab 0     Sig: Take 1 Tab by mouth daily.  raNITIdine (ZANTAC) 150 mg tablet 180 Tab 0     Sig: Take 1 Tab by mouth two (2) times daily as needed for Indigestion.      PCP: [address] [place] [address] Drive or Print: pharmacy  Mail order or Local pharmacy: [address] [place] [address] Avenue: 11/16/18  Last labs: 11/5/18   Next OV: 4/15/19

## 2019-04-15 NOTE — PROGRESS NOTES
Chief Complaint   Patient presents with    Diabetes     6 month follow up    ED Follow-up     Follow up from [facility] Emergency Room for syncope and collapse. Health Maintenance Due   Topic Date Due    Shingrix Vaccine Age 50> (1 of 2) 02/03/1983    FOOT EXAM Q1  09/29/2015    HEMOGLOBIN A1C Q6M  01/16/2019     1. Have you been to the ER, urgent care clinic or hospitalized since your last visit? YES.     2. Have you seen or consulted any other health care providers outside of the [address] [place] [address] since your last visit (Include any pap smears or colon screening)? NO    Do you have an Advanced Directive? No    Would you like information on Advanced Directives?  NO

## 2019-04-15 NOTE — PROGRESS NOTES
Patient: Jim Espinoza MRN: [id number]  SSN: [id number]    YOB: 1933  Age: 80 y.o. Sex: female      Date of Service: 4/15/2019   Provider: DIAN Springer   Office Location:   [address] [place], [address]  Office Phone: [phone]  Office Fax: [phone]      REASON FOR VISIT:   Chief Complaint   Patient presents with    Diabetes     6 month follow up    ED Follow-up     Follow up from [facility] Emergency Room for syncope and collapse. VITALS:   Visit Vitals  /80 (BP 1 Location: Left arm, BP Patient Position: Sitting)   Pulse 60   Temp 98.1 °F (36.7 °C) (Oral)   Resp 14   Ht 5' 7\" (1.702 m)   BMI 21.93 kg/m²        MEDICATIONS:   Current Outpatient Medications on File Prior to Visit   Medication Sig Dispense Refill    netarsudil mesylate (RHOPRESSA OP) Apply 2.5 mL to eye daily.  lisinopril (PRINIVIL, ZESTRIL) 5 mg tablet Take 1 Tab by mouth daily. 90 Tab 0    raNITIdine (ZANTAC) 150 mg tablet Take 1 Tab by mouth two (2) times daily as needed for Indigestion. 180 Tab 0    memantine (NAMENDA) 10 mg tablet 1 q am 90 Tab 4    ferrous sulfate 324 mg (65 mg iron) tablet Take 1 Tab by mouth Daily (before breakfast). 90 Tab 1    atorvastatin (LIPITOR) 80 mg tablet take 1 tablet by mouth once daily at bedtime 90 Tab 1    hydrocortisone (ALA-DEEDEE) 1 % lotion Apply  to affected area two (2) times a day. use thin layer 60 g 2    CALCIUM 600 600 mg calcium (1,500 mg) tablet take 1 tablet by mouth twice a day 60 Tab 6    acetaminophen (TYLENOL EXTRA STRENGTH) 500 mg tablet Take  by mouth every six (6) hours as needed for Pain.  dorzolamide (TRUSOPT) 2 % ophthalmic solution Administer 2 Drops to both eyes three (3) times daily.  aspirin delayed-release 81 mg tablet take 1 tablet by mouth once daily 30 Tab 6    latanoprost (XALATAN) 0.005 % ophthalmic solution Administer 1 Drop to both eyes nightly.  multivitamin (DAILY MULTIPLE) tablet Take 1 Tab by mouth daily. 30 Tab 6    peg 400-propylene glycol (SYSTANE) 0.4-0.3 % drop Administer  to left eye as needed. No current facility-administered medications on file prior to visit. ALLERGIES:   Allergies   Allergen Reactions    Pcn [Penicillins] Other (comments)     Unknown-happened when she was very young        MEDICAL/SURGICAL HISTORY:  Past Medical History:   Diagnosis Date    [facility]     NSTEM[facility]    Arthritis     Breast cancer ([facility] 2007    right breast (radiation)    Dementia     Diabetes ([facility]     diet controlled    GERD (gastroesophageal reflux disease)     Glaucoma     Hypertension       Past Surgical History:   Procedure Laterality Date    HX APPENDECTOMY      HX BREAST BIOPSY      HX CHOLECYSTECTOMY          FAMILY HISTORY:  Family History   Problem Relation Age of Onset    Cancer Mother     Cancer Father     Diabetes Sister     Diabetes Paternal Grandmother     Cancer Other         SOCIAL HISTORY:  Social History     Tobacco Use    Smoking status: Never Smoker    Smokeless tobacco: Never Used   Substance Use Topics    Alcohol use: No    Drug use: No             HISTORY OF PRESENT ILLNESS: Yaa Prieto is a 80 y.o. female who presents to the office for a routine follow up visit. Diabetes -  Currently the patient's condition is well controlled on diet alone  Last A1c: 5.0% on 7/16/18   She is no longer checking her blood sugars at home. Last urine microalbumin: overdue, but patient is not able to do screening due to dementia/inability to void on command. She is on an ACE inhibitor. Last lipid panel: 17/16/18, LDL 53.8 at that time. Patient is on statin therapy   UTD with ophtho and podiatry      Glaucoma -   Patient is followed closely by ophthalmology - Dr. Sander Ghosh and Dr. Darryl Bojorquez.  Patient's daughter reports she was recently started on a new eye drop as her IOP was not at goal at her most recent visit with Dr. Cora Dover.      Hypertension -   Currently, the patient's condition is stable. She has been slowly weaned off of most of her antihypertensives due to issues with hypotension. Now only taking lisinopril 5 mg daily. Patient/caregiver deny any complaints of chest discomfort or SOB      Dementia -   On Namenda  Followed by neurology  No acute complaints or concerns today per daughter      GERD -   Stable, well controlled on Zantac, though will experience breakthrough symptoms if she skips a dose. Syncope -  Patient was seen in the ED in February for syncope. Workup was essentially negative, labs and EKG were at patient's baseline. She had a similar episode back in November 2018, which was thought to be due to hypotension. She does not have a follow up with her cardiologist scheduled until September. REVIEW OF SYSTEMS:  Review of Systems   Constitutional: Negative for fever. Respiratory: Negative for cough and shortness of breath. Cardiovascular: Negative for chest pain. Gastrointestinal: Negative for blood in stool, constipation, diarrhea, nausea and vomiting. PHYSICAL EXAMINATION:  Physical Exam   Constitutional: She is oriented to person, place, and time and well-developed, well-nourished, and in no distress. Cardiovascular: Normal rate, regular rhythm, normal heart sounds and intact distal pulses. Exam reveals no gallop and no friction rub. No murmur heard. Pulmonary/Chest: Effort normal and breath sounds normal. She has no wheezes. She has no rales. Neurological: She is alert and oriented to person, place, and time. Gait normal.   Skin: Skin is warm and dry. No rash noted. RESULTS:  No results found for this visit on 04/15/19. ASSESSMENT/PLAN:  Diagnoses and all orders for this visit:    1.  Type 2 diabetes mellitus without complication, without long-term current use of insulin (HCC)  - Diet- controlled  - Will continue to monitor  - Fasting labs ordered to be done within the next few weeks  - Most recent notes from ophthalmology and podiatry requested  Orders:    -     METABOLIC PANEL, COMPREHENSIVE; Future  -     LIPID PANEL; Future  -     HEMOGLOBIN A1C W/O EAG; Future    2. Essential hypertension  - BP a bit low today, review of vitals from recent ER visit indicate hypotension   - Will have her d/c her lisinopril for now, as I suspect risks of hypotension/syncope outweigh benefits at this point, however daughter was encouraged to discuss this with cardiology   Orders:    -     METABOLIC PANEL, COMPREHENSIVE; Future  -     LIPID PANEL; Future  -     HEMOGLOBIN A1C W/O EAG; Future    3. Primary open angle glaucoma  - Records requested from ophthalmology     4. Dementia with behavioral disturbance, unspecified dementia type  - Neurology records reviewed   - Continue Namenda     5. Gastroesophageal reflux disease, esophagitis presence not specified  - Stable on Zantac   - continue current therapy     6. Syncope, unspecified syncope type  - ER records reviewed  - Will d/c lisinopril due to hypotension  - Could also be related to patients chronic (but stable) bradycardia  - Strongly encouraged follow up with cardiology          Follow-up and Dispositions    · Return in about 6 months (around 10/15/2019) for routine care + AWV with Dr. Eli Victoria. All questions answered. Patient expresses understanding and agrees with the plan as detailed above.     PATIENT CARE TEAM:   Patient Care Team:  DIAN Bass as PCP - General (Physician Assistant)  Steven House MD as Consulting Provider (Neurology)  Machelle Ortez MD (Cardiology)  Theresa Bryan MD (Ophthalmology)  Ana Duran MD (Ophthalmology)       DIAN Awan   April 15, 2019   1:20 PM

## 2019-05-19 NOTE — ED PROVIDER NOTES
EMERGENCY DEPARTMENT HISTORY AND PHYSICAL EXAM 
 
8:09 PM 
Date: 5/18/2019 Patient Name: Marj Palma History of Presenting Illness CC: AMS History Provided By: chart review HPI: Marj Palma is a 80 y.o. female with  history of breast cancer, dementia, diabetes, GERD, hypertension, glaucoma, CAD chart review here for altered mental status. Patient states that she is in pain but is unable to elaborate on any radiation, modifying factors, timing, onset. HPI is limited due to patient decreased alertness PCP: DIAN Richter Past History Past Medical History: 
Past Medical History:  
Diagnosis Date  ACS (acute coronary syndrome) ([facility] NSTEMI  Arthritis  Breast cancer [facility]) 2007  
 right breast (radiation)  Dementia  Diabetes ([facility] diet controlled  GERD (gastroesophageal reflux disease)  Glaucoma  Hypertension Past Surgical History: 
Past Surgical History:  
Procedure Laterality Date  HX APPENDECTOMY  HX BREAST BIOPSY  HX CHOLECYSTECTOMY Family History: 
Family History Problem Relation Age of Onset  Cancer Mother  Cancer Father  Diabetes Sister  Diabetes Paternal Grandmother  Cancer Other Social History: 
Social History Tobacco Use  Smoking status: Never Smoker  Smokeless tobacco: Never Used Substance Use Topics  Alcohol use: No  
 Drug use: No  
 
 
Allergies: Allergies Allergen Reactions  Pcn [Penicillins] Other (comments) Unknown-happened when she was very young Review of Systems ROS unable to be completed due to altered mental status Physical Exam  
 
Patient Vitals for the past 12 hrs: 
 Pulse Resp BP SpO2  
05/18/19 2300 65 20 112/81 100 % Physical Exam  
Constitutional: Appears cachectic. . Is not diaphoretic. Eyes: Conjunctiva clear, EOMI Head: Normocephalic and atraumatic. Neck: Normal range of motion. No stridor or tracheal deviation. Cardiovascular: Intact distal pulses. Pulmonary/Chest: Effort normal and no respiratory distress. Abdominal: Non distended. Musculoskeletal: Normal range of motion. Exhibits no tenderness. Neurological: Alert but is not oriented to person, place, time Skin: Skin is warm and dry. Psychiatric: Has a normal mood and affect. Behavior is normal.  
Nursing note and vitals reviewed. Diagnostic Study Results Labs - Recent Results (from the past 12 hour(s)) METABOLIC PANEL, COMPREHENSIVE Collection Time: 05/18/19 10:27 PM  
Result Value Ref Range Sodium 142 136 - 145 mmol/L Potassium 4.2 3.5 - 5.5 mmol/L Chloride 110 (H) 100 - 108 mmol/L  
 CO2 29 21 - 32 mmol/L Anion gap 3 3.0 - 18 mmol/L Glucose 102 (H) 74 - 99 mg/dL BUN 30 (H) 7.0 - 18 MG/DL Creatinine 0.89 0.6 - 1.3 MG/DL  
 BUN/Creatinine ratio 34 (H) 12 - 20 GFR est AA >60 >60 ml/min/1.73m2 GFR est non-AA >60 >60 ml/min/1.73m2 Calcium 9.4 8.5 - 10.1 MG/DL Bilirubin, total 0.3 0.2 - 1.0 MG/DL  
 ALT (SGPT) 21 13 - 56 U/L  
 AST (SGOT) 35 15 - 37 U/L Alk. phosphatase 69 45 - 117 U/L Protein, total 7.7 6.4 - 8.2 g/dL Albumin 3.5 3.4 - 5.0 g/dL Globulin 4.2 (H) 2.0 - 4.0 g/dL A-G Ratio 0.8 0.8 - 1.7    
CBC WITH AUTOMATED DIFF Collection Time: 05/18/19 10:27 PM  
Result Value Ref Range WBC 8.2 4.6 - 13.2 K/uL  
 RBC 3.68 (L) 4.20 - 5.30 M/uL  
 HGB 11.5 (L) 12.0 - 16.0 g/dL HCT 34.3 (L) 35.0 - 45.0 % MCV 93.2 74.0 - 97.0 FL  
 MCH 31.3 24.0 - 34.0 PG  
 MCHC 33.5 31.0 - 37.0 g/dL  
 RDW 13.3 11.6 - 14.5 % PLATELET 409 760 - 723 K/uL MPV 11.4 9.2 - 11.8 FL  
 NEUTROPHILS 89 (H) 40 - 73 % LYMPHOCYTES 5 (L) 21 - 52 % MONOCYTES 6 3 - 10 % EOSINOPHILS 0 0 - 5 % BASOPHILS 0 0 - 2 %  
 ABS. NEUTROPHILS 7.3 1.8 - 8.0 K/UL  
 ABS. LYMPHOCYTES 0.4 (L) 0.9 - 3.6 K/UL  
 ABS. MONOCYTES 0.5 0.05 - 1.2 K/UL ABS. EOSINOPHILS 0.0 0.0 - 0.4 K/UL  
 ABS. BASOPHILS 0.0 0.0 - 0.1 K/UL  
 DF AUTOMATED CARDIAC PANEL,(CK, CKMB & TROPONIN) Collection Time: 05/18/19 10:27 PM  
Result Value Ref Range  (H) 26 - 192 U/L  
 CK - MB 9.6 (H) <3.6 ng/ml CK-MB Index 3.7 0.0 - 4.0 % Troponin-I, QT <0.02 0.0 - 0.045 NG/ML  
ETHYL ALCOHOL Collection Time: 05/18/19 10:27 PM  
Result Value Ref Range ALCOHOL(ETHYL),SERUM <3 0 - 3 MG/DL MAGNESIUM Collection Time: 05/18/19 10:27 PM  
Result Value Ref Range Magnesium 2.2 1.6 - 2.6 mg/dL SALICYLATE Collection Time: 05/18/19 10:27 PM  
Result Value Ref Range Salicylate level <2.6 (L) 2.8 - 20.0 MG/DL  
TSH 3RD GENERATION Collection Time: 05/18/19 10:27 PM  
Result Value Ref Range TSH 1.50 0.36 - 3.74 uIU/mL VITAMIN B12 Collection Time: 05/18/19 10:27 PM  
Result Value Ref Range Vitamin B12 388 211 - 911 pg/mL ACETAMINOPHEN Collection Time: 05/18/19 10:27 PM  
Result Value Ref Range Acetaminophen level <2 (L) 10.0 - 30.0 ug/mL POC LACTIC ACID Collection Time: 05/18/19 10:54 PM  
Result Value Ref Range Lactic Acid (POC) 1.11 0.40 - 2.00 mmol/L  
EKG, 12 LEAD, INITIAL Collection Time: 05/18/19 10:56 PM  
Result Value Ref Range Ventricular Rate 66 BPM  
 Atrial Rate 63 BPM  
 QRS Duration 138 ms Q-T Interval 442 ms QTC Calculation (Bezet) 463 ms Calculated R Axis -62 degrees Calculated T Axis 137 degrees Diagnosis Atrial fibrillation Right bundle branch block Left anterior fascicular block Bifascicular block Minimal voltage criteria for LVH, may be normal variant Anteroseptal infarct (cited on or before 05-NOV-2018) T wave abnormality, consider lateral ischemia Abnormal ECG When compared with ECG of 18-FEB-2019 21:01, Atrial fibrillation has replaced Sinus rhythm Questionable change in initial forces of Septal leads T wave inversion no longer evident in Inferior leads T wave inversion more evident in Lateral leads Radiologic Studies - No results found. Medical Decision Making ED Course: Progress Notes, Reevaluation, and Consults: 
 
8:09 PM Initial assessment performed. The patients presenting problems have been discussed, and they/their family are in agreement with the care plan formulated and outlined with them. I have encouraged them to ask questions as they arise throughout their visit. 11:55 PMreassessment with patient's daughter patient is back to her mental baseline per daughter. It appears the patient had a syncopal episode earlier and recovered now. Extensive work-up for altered mental status is negative and she has a reassuring troponin and EKG as well. Daughter is amenable with patient follow-up with her cardiologist as well as her primary care doctor ASAP for further assessment. Provider Notes (Medical Decision Making): Based on history, physical exam, and diagnostic evaluation the pt appears to have a syncopal episode. The pt's laboratory data was non-diagnostic and EKG showed no malignant dysrythmia such as Brugada, long QTc, HoCM, or obvious ischemia. I believe they are low risk for primary cardiac or neurologic cause for syncope, or other etiology such as PE, AAA. I believe they can be followed as an outpatient since risks of hospitalization likely outweigh benefits. Pt will be discharged to follow-up with primary care physician in 1-2 days Vital Signs-Reviewed the patient's vital signs. Reviewed pt's pulse ox reading. EKG: Interpreted by the EP. Time Interpreted: 9941 Rate: 66 Rhythm: A. fib Interpretation: Bifascicular block, nonspecific STs,  Comparison: February 18, 2019similar Records Reviewed: Nursing Notes and Old Medical Records (Time of Review: 8:09 PM) 
-I am the first provider for this patient. 
-I reviewed the vital signs, available nursing notes, past medical history, past surgical history, family history and social history. Current Facility-Administered Medications Medication Dose Route Frequency Provider Last Rate Last Dose  sodium chloride (NS) flush 5-10 mL  5-10 mL IntraVENous PRN Jordon Lema MD      
 iopamidol (ISOVUE 300) 61 % contrast injection  mL   mL IntraVENous RAD Timothy Rojo MD      
 
Current Outpatient Medications Medication Sig Dispense Refill  atorvastatin (LIPITOR) 80 mg tablet TAKE 1 TABLET BY MOUTH ONCE DAILY AT BEDTIME 90 Tab 1  
 netarsudil mesylate (RHOPRESSA OP) Apply 2.5 mL to eye daily.  raNITIdine (ZANTAC) 150 mg tablet Take 1 Tab by mouth two (2) times daily as needed for Indigestion. 180 Tab 0  
 memantine (NAMENDA) 10 mg tablet 1 q am 90 Tab 4  
 ferrous sulfate 324 mg (65 mg iron) tablet Take 1 Tab by mouth Daily (before breakfast). 90 Tab 1  
 hydrocortisone (ALA-DEEDEE) 1 % lotion Apply  to affected area two (2) times a day. use thin layer 60 g 2  
 CALCIUM 600 600 mg calcium (1,500 mg) tablet take 1 tablet by mouth twice a day 60 Tab 6  
 acetaminophen (TYLENOL EXTRA STRENGTH) 500 mg tablet Take  by mouth every six (6) hours as needed for Pain.  dorzolamide (TRUSOPT) 2 % ophthalmic solution Administer 2 Drops to both eyes three (3) times daily.  aspirin delayed-release 81 mg tablet take 1 tablet by mouth once daily 30 Tab 6  
 latanoprost (XALATAN) 0.005 % ophthalmic solution Administer 1 Drop to both eyes nightly.  multivitamin (DAILY MULTIPLE) tablet Take 1 Tab by mouth daily. 30 Tab 6  peg 400-propylene glycol (SYSTANE) 0.4-0.3 % drop Administer  to left eye as needed. Clinical Impression Clinical Impression: 1. Syncope and collapse Disposition: [place] home

## 2019-05-19 NOTE — DISCHARGE INSTRUCTIONS
Your evaluation today showed fainting. Please follow up with a doctor as discussed. The evaluation and treatment done today requires that you follow up with a physician for re-evaluation. Not following up could result in chronic pain/disability/injury. Medical problems can change over time and symptoms can get worse or new symptoms can develop over time, therefore, it is important that you follow up as we discussed or return immedediately to the ER. Diseases don't read textbooks and there are limitations to our evaluation and testing, so please immediately return to the ER if you have any concerns. Call the ER if you have any questions about what we discussed. Please visit [email or web address] for discounts on any prescriptions you may have. At the [facility] Emergency Department we are genuinely concerned about your health and comfort. You may be selected to participate in a patient satisfaction survey mailed to your home. We are excited about the opportunity to learn from your experience so we may continue to improve. In striving for the very best we believe good is not good enough, but if you rate us as EXCELLENT in all boxes, we have succeeded. We strive to provide EXCELLENT care to you and your family. We appreciate the opportunity to take care of you, and hope you do well. Fainting: Care Instructions  Your Care Instructions    When you faint, or pass out, you lose consciousness for a short time. A brief drop in blood flow to the brain often causes it. When you fall or lie down, more blood flows to your brain and you regain consciousness. Emotional stress, pain, or overheating--especially if you have been standing--can make you faint. In these cases, fainting is usually not serious. But fainting can be a sign of a more serious problem. Your doctor may want you to have more tests to rule out other causes. The treatment you need depends on the reason why you fainted.   The doctor has checked you carefully, but problems can develop later. If you notice any problems or new symptoms, get medical treatment right away. Follow-up care is a key part of your treatment and safety. Be sure to make and go to all appointments, and call your doctor if you are having problems. It's also a good idea to know your test results and keep a list of the medicines you take. How can you care for yourself at home? · Drink plenty of fluids to prevent dehydration. If you have kidney, heart, or liver disease and have to limit fluids, talk with your doctor before you increase your fluid intake. When should you call for help? Call 911 anytime you think you may need emergency care. For example, call if:    · You have symptoms of a heart problem. These may include:  ? Chest pain or pressure. ? Severe trouble breathing. ? A fast or irregular heartbeat. ? Lightheadedness or sudden weakness. ? Coughing up pink, foamy mucus. ? Passing out. After you call 911, the  may tell you to chew 1 adult-strength or 2 to 4 low-dose aspirin. Wait for an ambulance. Do not try to drive yourself.     · You have symptoms of a stroke. These may include:  ? Sudden numbness, tingling, weakness, or loss of movement in your face, arm, or leg, especially on only one side of your body. ? Sudden vision changes. ? Sudden trouble speaking. ? Sudden confusion or trouble understanding simple statements. ? Sudden problems with walking or balance. ? A sudden, severe headache that is different from past headaches.     · You passed out (lost consciousness) again.    Watch closely for changes in your health, and be sure to contact your doctor if:    · You do not get better as expected. Where can you learn more? Go to http://michell-everardo.info/. Enter T336 in the search box to learn more about \"Fainting: Care Instructions. \"  Current as of: September 23, 2018  Content Version: 11.9  © 0300-5142 [email or web address], [employer].  Care instructions adapted under license by [employer] (which disclaims liability or warranty for this information). If you have questions about a medical condition or this instruction, always ask your healthcare professional. [address] any warranty or liability for your use of this information.

## 2019-05-24 NOTE — TELEPHONE ENCOUNTER
Pt's daughter found a pressure sore and she states that she was going to put antibacterial cream and gauze on it . She would like to make sure that she is doing the right thing. please advise.

## 2019-05-24 NOTE — TELEPHONE ENCOUNTER
Jessica Rivera [phone] advising patient's daughter Lester Newton that [facility] is out of the office until 5/28/19. She was also advised that without seeing the patient's wound it is difficult to recommend a course of treatment. Advised that message will be routed to [facility] but instructed to go to ER if patient develops a fever or any signs of infection such as redness, swelling, yellow/green/foul smelling drainage.

## 2019-05-27 NOTE — TELEPHONE ENCOUNTER
I would recommend an office visit to assess the wound and refer for home health wound care if necessary

## 2019-05-29 NOTE — TELEPHONE ENCOUNTER
Spoke with Hardy Ma [facility] daughter) to follow up and to scheduled appointment.  Per Hardy Ma wound is beginning to heal and she has already scheduled an appointment with front for June 13,2019

## 2019-06-24 NOTE — PROGRESS NOTES
Patient: Reno Morales MRN: [id number]  SSN: [id number]    YOB: 1933  Age: 80 y.o. Sex: female      Date of Service: 6/24/2019   Provider: DIAN Schulte   Office Location:   [address] [facility]  [address], [address]  Office Phone: [phone]  Office Fax: [phone]        REASON FOR VISIT:   Chief Complaint   Patient presents with    Wound Check        VITALS:   Visit Vitals  /72 (BP 1 Location: Left arm, BP Patient Position: Sitting)   Pulse 65   Temp 98.3 °F (36.8 °C) (Oral)   Resp 16   Ht 5' 7\" (1.702 m)   Wt 136 lb (61.7 kg) Comment: per daughter   SpO2 97%   BMI 21.30 kg/m²       MEDICATIONS:   Current Outpatient Medications   Medication Sig Dispense Refill    lisinopril (PRINIVIL, ZESTRIL) 5 mg tablet TAKE 1 TABLET EVERY DAY 90 Tab 0    raNITIdine (ZANTAC) 150 mg tablet TAKE 1 TABLET BY MOUTH TWO  TIMES DAILY AS NEEDED FOR INDIGESTION. 180 Tab 0    atorvastatin (LIPITOR) 80 mg tablet TAKE 1 TABLET BY MOUTH ONCE DAILY AT BEDTIME 90 Tab 1    netarsudil mesylate (RHOPRESSA OP) Apply 2.5 mL to eye daily.  memantine (NAMENDA) 10 mg tablet 1 q am 90 Tab 4    ferrous sulfate 324 mg (65 mg iron) tablet Take 1 Tab by mouth Daily (before breakfast). 90 Tab 1    CALCIUM 600 600 mg calcium (1,500 mg) tablet take 1 tablet by mouth twice a day 60 Tab 6    dorzolamide (TRUSOPT) 2 % ophthalmic solution Administer 2 Drops to both eyes three (3) times daily.  aspirin delayed-release 81 mg tablet take 1 tablet by mouth once daily 30 Tab 6    latanoprost (XALATAN) 0.005 % ophthalmic solution Administer 1 Drop to both eyes nightly.  multivitamin (DAILY MULTIPLE) tablet Take 1 Tab by mouth daily. 30 Tab 6    peg 400-propylene glycol (SYSTANE) 0.4-0.3 % drop Administer  to left eye as needed.  hydrocortisone (ALA-DEEDEE) 1 % lotion Apply  to affected area two (2) times a day.  use thin layer 60 g 2    acetaminophen (TYLENOL EXTRA STRENGTH) 500 mg tablet Take  by mouth every six (6) hours as needed for Pain. 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 female who presents to the office for acute care. Here today to check on a small pressure sore that has been present on her R buttocks area x about 1 month. Patient's daughter is her primary caregiver, and noticed the sore one day while helping patient get changed. Daughter applied triple antibiotic ointment and dressed the wound with gauze for about 1 week, and has since just been keeping the area clean and dry. She states it seems to be healing, but simply wanted to have the area checked by a provider. Patient is not complaining of any pain. Also of note, Ms. Chris Oscar was back in the ER on 5/18 for another syncopal episode. ED workup did not reveal any acute process, and since symptoms had resolved, she was discharged home in stable condition. I had advised patient and her daughter to follow up with cardiology, as this has been a recurrent issue. Appointment with cardiology is scheduled in September for routine follow up. REVIEW OF SYSTEMS:  Review of Systems   Constitutional: Negative for chills and fever. Respiratory: Negative for cough, shortness of breath and wheezing. Cardiovascular: Negative for chest pain. Gastrointestinal: Negative for vomiting. PHYSICAL EXAMINATION:  Physical Exam   Constitutional: She is oriented to person, place, and time. patient appears frail, seated in wheelchair, but awake, alert, and cooperative   Cardiovascular: Normal rate, regular rhythm and normal heart sounds. Exam reveals no gallop and no friction rub. No murmur heard. Pulmonary/Chest: Effort normal and breath sounds normal. She has no wheezes. She has no rales. Neurological: She is alert and oriented to person, place, and time. Gait normal.   Skin: Skin is warm and dry. No rash noted. small (approx 1 cm x 1 cm) scabbed area to skin overlying R ischial tuberosity. no surrounding erythema, warmth or drainage    Psychiatric: Mood, memory and affect normal.        RESULTS:  No results found for this visit on 06/24/19. ASSESSMENT/PLAN:  Diagnoses and all orders for this visit:    1. Pressure injury of right buttock, stage 1  - Nearly completely healed at this point, reassurance provided   - Daughter/caregiver advised to continue to keep the area as clean and dry as possible, and help patient with frequent position changes to prevent further skin breakdown    2. Syncope, unspecified syncope type  - ER documentation reviewed  - Again expressed my concern that these recurrent episodes may be cardiac in nature and strongly advised patient/daughter to follow up with cardiology       Follow-up and Dispositions    · Return in about 4 months (around 10/15/2019) for annual medicare wellness/routine care . All questions answered. Patient expresses understanding and agrees with the plan as detailed above.            PATIENT CARE TEAM:   Patient Care Team:  DIAN Bazzi as PCP - General (Physician Assistant)  Jarvis Mcgovern MD as Consulting Provider (Neurology)  Estrellita Nielsen MD (Cardiology)  Florence Farfan MD (Ophthalmology)  Yesica Melgar MD (Ophthalmology)  Bro Brito DPM (Podiatry) DIAN Nogueira   June 24, 2019   4:08 PM

## 2019-06-24 NOTE — PATIENT INSTRUCTIONS
Learning About Preventing Pressure Injuries  What are pressure injuries? A pressure injury to the skin is caused by constant pressure over a period of time. The constant pressure blocks the blood supply to the skin. This causes skin cells to die and creates a sore. Pressure injuries are also called bedsores. Pressure injuries usually occur over bony areas, such as the hips, lower back, elbows, heels, and shoulders. Pressure injuries can also occur in places where the skin folds over on itself, or where medical equipment presses on the skin, such as when oxygen tubes press on the ears or cheeks. Pressure injuries can range from red areas on the surface of the skin to severe tissue damage that goes deep into muscle and bone. Severe sores are hard to treat and slow to heal. When pressure injuries do not heal properly, problems such as bone, blood, and skin infections can develop. What causes pressure injuries? Things that cause pressure injuries include:  · Pressure on the skin and tissues. For example, the sores may happen when a person lies in bed or sits in a wheelchair for a long time. This is the most common cause of pressure injuries. · Sliding down in a bed or chair, forcing the skin to fold over itself (shear force). · Being pulled across bed sheets or other surfaces (friction burns). As we get older, our skin gets more thin and dry and less elastic, so it is easier to damage. Poor nutrition and not getting enough fluids make these natural changes in the skin worse. Skin in this condition may easily develop a pressure injury. Skin can also be damaged by sweat, feces, or urine, making pressure injuries more likely and harder to heal.  How can you help prevent pressure injuries? If you are not able to do these things yourself, ask a family member or friend for help. Change position often  · In a bed, change position every 2 hours.   · In a wheelchair or other type of chair, shift your weight every 15 minutes, and give yourself a full relief of weight every hour. ? For a weight shift, lean forward and to the left and right. Push up out of the chair with your arms. If you have a chair that tilts, use the tilt control to help relieve pressure. ? For a full relief of weight, stand up or move to another chair or bed if you are able to. Personal care  · Check your skin every day, especially around bony areas. When a pressure injury is forming, skin temperature can be different than nearby skin. It might be warmer or cooler. The skin can feel either firmer or softer than the surrounding skin. · Keep your skin clean and free of sweat, wound drainage, urine, and feces. · Use skin lotions to keep your skin from drying out and cracking. Barrier lotions or creams have ingredients that can act as a shield to help protect the skin from moisture or irritation. · Try not to slide or slump across sheets in a chair or bed. And try not to sleep in a recliner chair. Lifestyle choices  · Eat healthy foods with plenty of protein to help heal damaged skin and to help new skin grow. · Get plenty of fluids. · Stay at a healthy weight. Being either overweight or underweight can make pressure injuries more likely. · If you smoke, stop. Smoking dries the skin and reduces its blood supply. If you need help quitting, talk to your doctor about stop-smoking programs and medicines. These can increase your chances of quitting for good. Ask about using cushions or pads  · Overlays are special pads you put on top of a mattress. They provide a softer surface that will fit your body's shape better than a regular mattress. · Cushions or devices can be used to reduce pressure on certain areas of the body. For example, you can use a \"medical heel pillow\" to help prevent pressure injuries on heels. You can also get cushions for seating surfaces, such as wheelchair seats. Talk with your doctor about cushions and pads.  Some products, such as doughnut-type devices, may actually cause pressure injuries or make them worse. Where can you learn more? Go to http://michell-everardo.info/. Enter 658 1393 in the search box to learn more about \"Learning About Preventing Pressure Injuries. \"  Current as of: September 26, 2018  Content Version: 11.9  © 6368-0993 [employer]. Care instructions adapted under license by [employer] (which disclaims liability or warranty for this information). If you have questions about a medical condition or this instruction, always ask your healthcare professional. [place] [address] any warranty or liability for your use of this information.

## 2019-08-14 NOTE — TELEPHONE ENCOUNTER
Will try ordering a home health wound care consultation. Skilled nursing may be able to make recommendations as far as wound care, and perhaps a change in her bedding or wheelchair pad to help prevent these from recurring.  However, please advise daughter that face to face visit may be required

## 2019-08-14 NOTE — TELEPHONE ENCOUNTER
Spoke with [place] Bejarano daughter (Maru) to advise that per Miguelina Alvarez she try ordering a home health wound care consultation. Perhaps skilled nursing may be able to make recommendations as far as wound care, and perhaps a change in her bedding or wheelchair pad to help prevent these from recurring. However, please advise daughter that face to face visit may be required. Maru voice complete understanding.

## 2019-08-14 NOTE — TELEPHONE ENCOUNTER
Noe Peraza found a pressure sore under her right buttock on the side she sleeps on  and put medication Antibiotic cream on it. She states that it is not bleeding or oozing. She states that it is looking better and would like to know if there is anything else she needs to do.  Please advise

## 2019-08-23 NOTE — TELEPHONE ENCOUNTER
Jayla Handley of [facility] called and would like a order placed for a hospital bed and a naveen left. Please call Courtney Conner at your earliest convenience.

## 2019-08-26 NOTE — TELEPHONE ENCOUNTER
Spoke with Efrain Bee who states Mrs. Leonela Jones needs a semi electric bed and a naveen lift due to no weight bearing on her legs. Efrain Bee does not know of any [facility] codes. Order to be faxed to [phone] attention Efrain Bee.

## 2019-08-26 NOTE — TELEPHONE ENCOUNTER
Are they able to give any more specifics as far as what type of hospital bed and [facility] lift to meet the patient's needs? Are there specific [facility] codes that I should use?

## 2019-09-04 NOTE — PROGRESS NOTES
1. Have you been to the ER, urgent care clinic since your last visit? Hospitalized since your last visit? Yes When: 04/2019 Where: [facility] Reason for visit: Fainted    2. Have you seen or consulted any other health care providers outside of the [address] [place] [address] since your last visit? Include any pap smears or colon screening.  No

## 2019-09-04 NOTE — PROGRESS NOTES
HISTORY OF PRESENT ILLNESS  Maddison León is a 80 y.o. female. Admitted 10/2017  Bradycardia-AMS-off verapamil since then  No significant symptoms  9/2019  On follow-up denies any ongoing complaints. Patient was in emergency room 5/2019 with altered mentation possible syncope. No clear etiology documented. No recurrence. Since then she is off her lisinopril in the evening. Hypertension   The history is provided by the patient. This is a chronic problem. The problem occurs constantly. The problem has not changed since onset. Pertinent negatives include no chest pain, no abdominal pain, no headaches and no shortness of breath. Nothing aggravates the symptoms. Slow Heart Rate   The history is provided by the patient. This is a chronic problem. The problem occurs constantly. The problem has not changed since onset. Pertinent negatives include no chest pain, no abdominal pain, no headaches and no shortness of breath. Nothing aggravates the symptoms. Nothing relieves the symptoms. Review of Systems   Constitutional: Negative for chills and fever. HENT: Negative for nosebleeds. Eyes: Negative for blurred vision and double vision. Respiratory: Negative for cough, hemoptysis, sputum production, shortness of breath and wheezing. Cardiovascular: Negative for chest pain, palpitations, orthopnea, claudication, leg swelling and PND. Gastrointestinal: Negative for abdominal pain, heartburn, nausea and vomiting. Musculoskeletal: Negative for myalgias. Skin: Negative for rash. Neurological: Negative for dizziness, weakness and headaches. Endo/Heme/Allergies: Does not bruise/bleed easily.      Family History   Problem Relation Age of Onset    Cancer Mother     Cancer Father     Diabetes Sister     Diabetes Paternal Grandmother     Cancer Other        Past Medical History:   Diagnosis Date    ACS (acute coronary syndrome) ([facility]     NSTEMI    Arthritis     [facility] 2007 right breast (radiation)    Dementia     Diabetes (HCC)     diet controlled    GERD (gastroesophageal reflux disease)     Glaucoma     Hypertension        Past Surgical History:   Procedure Laterality Date    HX APPENDECTOMY      HX BREAST BIOPSY      HX CHOLECYSTECTOMY         Social History     Tobacco Use    Smoking status: Never Smoker    Smokeless tobacco: Never Used   Substance Use Topics    Alcohol use: No       Allergies   Allergen Reactions    Pcn [Penicillins] Other (comments)     Unknown-happened when she was very young       Prior to Admission medications    Medication Sig Start Date End Date Taking? Authorizing Provider   atorvastatin (LIPITOR) 80 mg tablet TAKE 1 TABLET EVERY DAY AT BEDTIME 8/27/19  Yes Sindhu Barron NP   lisinopril (PRINIVIL, ZESTRIL) 5 mg tablet TAKE 1 TABLET EVERY DAY 8/20/19  Yes Berenice Rodarte PA   raNITIdine (ZANTAC) 150 mg tablet TAKE 1 TABLET BY MOUTH TWO  TIMES DAILY AS NEEDED FOR INDIGESTION. 8/20/19  Yes DIAN Prakash   netarsudil mesylate (RHOPRESSA OP) Administer 2.5 mL to both eyes daily. Yes Provider, Historical   memantine (NAMENDA) 10 mg tablet 1 q am 2/20/19  Yes Gloria Brewster NP   ferrous sulfate 324 mg (65 mg iron) tablet Take 1 Tab by mouth Daily (before breakfast). 2/14/19  Yes Berenice Rodarte PA   hydrocortisone (ALA-DEEDEE) 1 % lotion Apply  to affected area two (2) times a day. use thin layer 4/5/18  Yes Mel Villa MD   CALCIUM 600 600 mg calcium (1,500 mg) tablet take 1 tablet by mouth twice a day 2/15/18  Yes Mel Villa MD   acetaminophen (TYLENOL EXTRA STRENGTH) 500 mg tablet Take  by mouth every six (6) hours as needed for Pain. Yes Provider, Historical   dorzolamide (TRUSOPT) 2 % ophthalmic solution Administer 2 Drops to both eyes three (3) times daily.    Yes Provider, Historical   aspirin delayed-release 81 mg tablet take 1 tablet by mouth once daily 12/26/17  Yes Mel Villa MD latanoprost (XALATAN) 0.005 % ophthalmic solution Administer 1 Drop to both eyes nightly. Yes Provider, Historical   multivitamin (DAILY MULTIPLE) tablet Take 1 Tab by mouth daily. 10/13/15  Yes Eloisa Caro NP   peg 400-propylene glycol (SYSTANE) 0.4-0.3 % drop Administer  to left eye as needed. Yes Provider, Historical         Visit Vitals  /88   Pulse 62   Ht 5' 7\" (1.702 m)   Wt 61.7 kg (136 lb)   BMI 21.30 kg/m²         Physical Exam   Constitutional: She is oriented to person, place, and time. She appears well-developed and well-nourished. HENT:   Head: Normocephalic and atraumatic. Eyes: Conjunctivae are normal.   Neck: Neck supple. No JVD present. No tracheal deviation present. No thyromegaly present. Cardiovascular: Normal rate and regular rhythm. PMI is not displaced. Exam reveals no gallop, no S3 and no decreased pulses. No murmur heard. Pulmonary/Chest: No respiratory distress. She has no wheezes. She has no rales. She exhibits no tenderness. Abdominal: Soft. There is no tenderness. Musculoskeletal: She exhibits no edema. Neurological: She is alert and oriented to person, place, and time. Skin: Skin is warm. Psychiatric: She has a normal mood and affect. Ms. Leonela Jones has a reminder for a \"due or due soon\" health maintenance. I have asked that she contact her primary care provider for follow-up on this health maintenance. SUMMARY:10/2017  Left ventricle: Ejection fraction was estimated in the range of 55 % to 60 %. No obvious wall motion abnormalities  identified in the views obtained. Wall thickness was mildly increased. Pulmonic valve: There was mild regurgitation. 10/2017  Abnormal ECG. 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 Troponin unremarkable. Normal LV function with EF 50-55% by echo 10/2016.   -Advanced age, dementia, full code.   Chronic sinus bradycardia, previously intolerant to BB, on verapamil as outpatient. No significant pauses overnight      Assessment         ICD-10-CM ICD-9-CM    1. Essential hypertension I10 401.9     Elevated in office continue to monitor continue treatment   2. Syncope, unspecified syncope type R55 780.2     Rare episode last in 5/2019. Possible orthostatic hypotension lisinopril has been discontinued. Will monitor blood pressure with little on the high side   3. Dementia associated with other underlying disease with behavioral disturbance F02.81 294.8      294.11     Stable monitor   4. Bradycardia R00.1 427.89     Resolved. Continue to monitor off for appointment   5. Hyperlipidemia, unspecified hyperlipidemia type E78.5 272.4     Continue monitoring and treatment and follow-up with PCP   6. Type 2 diabetes mellitus without complication, without long-term current use of insulin ([facility]) E11.9 250.00     Continue treatment lab follow-up with PCP   family has decided against pacemaker in past    9/2018  Bradycardia stable after verapamil was discontinued. Blood pressure fairly well controlled on lisinopril. Leg edema better  9/2019  Cardiac status stable. Syncope in 5/2019. No recurrence. Blood pressure elevated but monitor  There are no discontinued medications. No orders of the defined types were placed in this encounter. Follow-up and Dispositions    · Return in about 1 year (around 9/4/2020).

## 2019-12-17 NOTE — ACP (ADVANCE CARE PLANNING)
Advance Care Planning Advance Care Planning (ACP) Provider Note - Comprehensive Date of ACP Conversation: 12/17/19 Persons included in Conversation:  patient and family Length of ACP Conversation in minutes:  16 minutes Authorized Decision Maker (if patient is incapable of making informed decisions): This person is: 
Court-Appointed Guardian Coreen Rush ACP for ALL Patients with Decision Making Capacity: 
 Importance of advance care planning, including choosing a healthcare agent to communicate patient's healthcare decisions if patient lost the ability to make decisions, such as after a sudden illness or accident Understanding of the healthcare agent role was assessed and information provided Review of Existing Advance Directive: 
does not exist 
 
For Serious or Chronic Illness: 
Understanding of medical condition Interventions Provided: 
discussed DNR thoughts. still deciding

## 2019-12-17 NOTE — PATIENT INSTRUCTIONS
Medicare Wellness Visit, Female The best way to live healthy is to have a lifestyle where you eat a well-balanced diet, exercise regularly, limit alcohol use, and quit all forms of tobacco/nicotine, if applicable. Regular preventive services are another way to keep healthy. Preventive services (vaccines, screening tests, monitoring & exams) can help personalize your care plan, which helps you manage your own care. Screening tests can find health problems at the earliest stages, when they are easiest to treat. [facility] follows the current, evidence-based guidelines published by the [facility] ([facility]) when recommending preventive services for our patients. Because we follow these guidelines, sometimes recommendations change over time as research supports it. (For example, mammograms used to be recommended annually. Even though Medicare will still pay for an annual mammogram, the newer guidelines recommend a mammogram every two years for women of average risk). Of course, you and your doctor may decide to screen more often for some diseases, based on your risk and your co-morbidities (chronic disease you are already diagnosed with). Preventive services for you include: - Medicare offers their members a free annual wellness visit, which is time for you and your primary care provider to discuss and plan for your preventive service needs. Take advantage of this benefit every year! 
-All adults over the age of 72 should receive the recommended pneumonia vaccines. Current USPSTF guidelines recommend a series of two vaccines for the best pneumonia protection.  
-All adults should have a flu vaccine yearly and a tetanus vaccine every 10 years.  
-All adults age 48 and older should receive the shingles vaccines (series of two vaccines). -All adults age 38-68 who are overweight should have a diabetes screening test once every three years. -All adults born between [address] and 1965 should be screened once for Hepatitis C. 
-Other screening tests and preventive services for persons with diabetes include: an eye exam to screen for diabetic retinopathy, a kidney function test, a foot exam, and stricter control over your cholesterol.  
-Cardiovascular screening for adults with routine risk involves an electrocardiogram (ECG) at intervals determined by your doctor.  
-Colorectal cancer screenings should be done for adults age 54-65 with no increased risk factors for colorectal cancer. There are a number of acceptable methods of screening for this type of cancer. Each test has its own benefits and drawbacks. Discuss with your doctor what is most appropriate for you during your annual wellness visit. The different tests include: colonoscopy (considered the best screening method), a fecal occult blood test, a fecal DNA test, and sigmoidoscopy. 
 
-A bone mass density test is recommended when a woman turns 65 to screen for osteoporosis. This test is only recommended one time, as a screening. Some providers will use this same test as a disease monitoring tool if you already have osteoporosis. -Breast cancer screenings are recommended every other year for women of normal risk, age 54-69. 
-Cervical cancer screenings for women over age 72 are only recommended with certain risk factors. Here is a list of your current Health Maintenance items (your personalized list of preventive services) with a due date: 
Health Maintenance Due Topic Date Due  Shingles Vaccine (1 of 2) 02[birth year]/1983  Flu Vaccine  08/01/2019  Pneumococcal Vaccine (2 of 2 - PPSV23) 10/15/2019 Erik Annual Well Visit  10/16/2019  Hemoglobin A1C    11/14/2019

## 2019-12-17 NOTE — PROGRESS NOTES
1. Have you been to the ER, urgent care clinic since your last visit? Hospitalized since your last visit? No    2. Have you seen or consulted any other health care providers outside of the [address] [place] [address] since your last visit? Include any pap smears or colon screening. No       Physician order obtained. Patient completed adult immunization consent form. Allergies, contraindications and recommendations reviewed with patient. Seasonal influenza vaccine administered IM right deltoid and PPSV-23 vaccine IM left deltoid. Patient tolerated well. Patient remained in office for 15 minutes after injection and no adverse reactions were noted.     Lot # [id number]  Exp Date: 06-  [address] # [id number]    Lot # [id number]  Exp Date: 11-  [address] # [id number]

## 2019-12-17 NOTE — PROGRESS NOTES
SUBJECTIVE  Chief Complaint   Patient presents with    Cholesterol Problem    Dementia    Diabetes    GERD    Hypertension    Immunization/Injection     Diabetes -  Currently the patient's condition is well controlled on diet alone  A1c: today showing good control  She is no longer checking her blood sugars  Has had issues eating so wondering about adding glucerna. Last time on Boost, her sugar went up. Last urine microalbumin: overdue, but patient is not able to do screening due to dementia/inability to void on command. She is on an ACE inhibitor. Last lipid panel: Last LDL 53.8. Patient is on statin therapy   UTD with ophtho and podiatry      Glaucoma -   Patient is followed closely by ophthalmology - Dr. Kayli Church and Dr. Sudha Rapp. Patient's daughter reports she is using eye drops.      Hypertension -   Currently, the patient's condition is stable. She has been slowly weaned off of most of her antihypertensives due to issues with hypotension / syncope. No further episodes. Now only taking lisinopril 5 mg daily. Patient/caregiver deny any complaints of chest discomfort or SOB      Dementia -   On Namenda  Followed by neurology. She says due in Feb  No acute complaints or concerns today per daughter but is having a \"bad day\" not talking a lot which happens on occasion     GERD -   Stable, well controlled on Zantac, though will experience breakthrough symptoms if she skips a dose.     OBJECTIVE    Blood pressure 118/82, pulse (!) 58, temperature 98 °F (36.7 °C), temperature source Oral, resp. rate 14, height 5' 7\" (1.702 m), weight 134 lb (60.8 kg), SpO2 96 %. General:  Alert, cooperative, well appearing, in no apparent distress. Head:  Head is symmetric, normocephalic without evidence of trauma or deformity. ENT:    The tongue and mucous membranes are pink and moist without lesions. Neck:   The thyroid is normal size without nodules or masses. There is no lymphadenopathy.   There are no carotid bruits. CV:  The heart sounds are regular in rate and rhythm. There is a normal S1 and S2.   Lungs: Inspiratory and expiratory efforts are full and unlabored. Lung sounds are clear and equal to auscultation throughout all lung fields without wheezing, rales, or rhonchi. Neuro:  She is wheelchair bound with contractures of bilateral hamstrings and some in her left hand. Awake, alert but not oriented to place, time, or person. ASSESSMENT / PLAN    ICD-10-CM ICD-9-CM    1. Type 2 diabetes mellitus without complication, without long-term current use of insulin (HCC) E11.9 250.00 AMB POC HEMOGLOBIN A1C      CBC WITH AUTOMATED DIFF      METABOLIC PANEL, COMPREHENSIVE      LIPID PANEL      HEMOGLOBIN A1C W/O EAG      MICROALBUMIN, UR, RAND W/ MICROALB/CREAT RATIO   2. Essential hypertension D94 341.7 METABOLIC PANEL, COMPREHENSIVE      LIPID PANEL   3. Hypercholesterolemia E78.00 272.0    4. Gastroesophageal reflux disease with esophagitis K21.0 530.11 raNITIdine (ZANTAC) 150 mg tablet   5. Pressure injury of right buttock, stage 1 L89.311 707.05      707.21    6. Dementia with behavioral disturbance, unspecified dementia type ([facility] F03.91 294.21    7. Primary open angle glaucoma, unspecified glaucoma stage, unspecified laterality H40.1190 365.11      365.70    8. Encounter for immunization Z23 V03.89 ADMIN INFLUENZA VIRUS VAC      ADMIN PNEUMOCOCCAL VACCINE      PNEUMOCOCCAL POLYSACCHARIDE VACCINE, 23-VALENT, ADULT OR IMMUNOSUPPRESSED PT DOSE,      INFLUENZA VACCINE INACTIVATED (IIV), SUBUNIT, ADJUVANTED, IM     Type 2 diabetes mellitus without complication, without long-term current use of insulin (HCC)  - Diet- controlled  - Will continue to monitor  -Okay to add Glucerna supplements  - Fasting labs ordered to be done prior to 6 month follow-up  - Most recent notes from ophthalmology and podiatry     Essential hypertension  - Cont to see cardiology and cont current care.     Hypercholesterolemia   -cont statin.  -check lipids and LFTs.     Gastroesophageal reflux disease, esophagitis presence not specified  - Stable on Zantac   - continue current therapy     Pressure ulcer   -resolved after wound care. Dementia with behavioral disturbance, unspecified dementia type  - Neurology records reviewed   - Continue Namenda   -cont home health  -has home health aide as well     Primary open angle glaucoma  - Cont per ophthalmology      Vaccines administered per orders. All chart history elements were reviewed by me at the time of the visit even though marked at time of note closure. Patient understands our medical plan. Patient has provided input and agrees with goals. Alternatives have been explained and offered. All questions answered. The patient is to call if condition worsens or fails to improve. Follow-up and Dispositions    · Return in about 5 months (around 5/17/2020) for with [facility]UB[facility] for routine care. Fasting labs due 3-7 days prior to appointment .

## 2019-12-17 NOTE — PROGRESS NOTES
1. Have you been to the ER, urgent care clinic since your last visit? Hospitalized since your last visit? No 
 
2. Have you seen or consulted any other health care providers outside of the [address] [place] [address] since your last visit? Include any pap smears or colon screening. No  
 
Abuse Screening Questionnaire 12/17/2019 Do you ever feel afraid of your partner? (No Data) Are you in a relationship with someone who physically or mentally threatens you? Mallory Rivera Is it safe for you to go home? (No Data) Fall Risk Assessment, last 12 mths 12/17/2019 Able to walk? Yes Fall in past 12 months? No  
Fall with injury? -  
Number of falls in past 12 months - Fall Risk Score -  
 
 
 
 
 
3 most recent PHQ Screens 12/17/2019 PHQ Not Done Medical Reason (indicate in comments) Little interest or pleasure in doing things (No Data) Feeling down, depressed, irritable, or hopeless (No Data) Total Score PHQ 2 -

## 2019-12-17 NOTE — PROGRESS NOTES
Chief Complaint Patient presents with Rhoda Marr Annual Wellness Visit This is the Subsequent Medicare Annual Wellness Exam, performed 12 months or more after the Initial AWV or the last Subsequent AWV I have reviewed the patient's medical history in detail and updated the computerized patient record. History Patient Active Problem List  
Diagnosis Code  
 HTN (hypertension) I10  
 Diabetes mellitus ([address] E11.9  GERD (gastroesophageal reflux disease) K21.9  Frozen shoulder M75.00  
 History of breast cancer Z85.3  
 OA (osteoarthritis) of knee M17.10  
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  
 Tear of rotator cuff M75.100  Memory loss R41.3  Dementia associated with other underlying disease ([address] F02.80  Advance directive discussed with patient Z70.80 Past Medical History:  
Diagnosis Date  ACS (acute coronary syndrome) ([facility] NSTEMI  Arthritis  Breast cancer [facility]) 2007  
 right breast (radiation)  Dementia ([facility]  Diabetes ([facility] diet controlled  GERD (gastroesophageal reflux disease)  Glaucoma  Hypertension Past Surgical History:  
Procedure Laterality Date  HX APPENDECTOMY  HX BREAST BIOPSY  HX CHOLECYSTECTOMY Current Outpatient Medications Medication Sig Dispense Refill  memantine (NAMENDA) 10 mg tablet 1 q am (Patient taking differently: Take 10 mg by mouth daily. 1 q am) 90 Tab 4  
 CALCIUM 600 600 mg calcium (1,500 mg) tablet take 1 tablet by mouth twice a day 60 Tab 6  
 aspirin delayed-release 81 mg tablet take 1 tablet by mouth once daily 30 Tab 6  
 atorvastatin (LIPITOR) 80 mg tablet TAKE 1 TABLET EVERY DAY AT BEDTIME 90 Tab 1  
 lisinopril (PRINIVIL, ZESTRIL) 5 mg tablet TAKE 1 TABLET EVERY DAY 90 Tab 1  raNITIdine (ZANTAC) 150 mg tablet Take 1 Tab by mouth two (2) times a day. 180 Tab 0  
 netarsudil mesylate (RHOPRESSA OP) Administer 1 Drop to both eyes daily.  ferrous sulfate 324 mg (65 mg iron) tablet Take 1 Tab by mouth Daily (before breakfast). 90 Tab 1  
 hydrocortisone (ALA-DEEDEE) 1 % lotion Apply  to affected area two (2) times a day. use thin layer 60 g 2  
 acetaminophen (TYLENOL EXTRA STRENGTH) 500 mg tablet Take  by mouth every six (6) hours as needed for Pain.  dorzolamide (TRUSOPT) 2 % ophthalmic solution Administer 2 Drops to both eyes three (3) times daily.  latanoprost (XALATAN) 0.005 % ophthalmic solution Administer 1 Drop to both eyes nightly.  multivitamin (DAILY MULTIPLE) tablet Take 1 Tab by mouth daily. 30 Tab 6  peg 400-propylene glycol (SYSTANE) 0.4-0.3 % drop Administer 1 Drop to left eye as needed for Other (PRN Dry eyes). PRN Dry eyes Allergies Allergen Reactions  Pcn [Penicillins] Other (comments) Unknown-happened when she was very young Family History Problem Relation Age of Onset  Cancer Mother  Cancer Father  Diabetes Sister  Diabetes Paternal Grandmother  Cancer Other Social History Tobacco Use  Smoking status: Never Smoker  Smokeless tobacco: Never Used Substance Use Topics  Alcohol use: No  
 
 
Depression Risk Factor Screening:  
 
3 most recent PHQ Screens 12/17/2019 PHQ Not Done Medical Reason (indicate in comments) Little interest or pleasure in doing things (No Data) Feeling down, depressed, irritable, or hopeless (No Data) Total Score PHQ 2 - Alcohol Risk Factor Screening: Do you average 1 drink per night or more than 7 drinks a week:  No 
 
On any one occasion in the past three months have you have had more than 3 drinks containing alcohol:  No 
 
 
Functional Ability and Level of Safety:  
Hearing: cannot assess due to dementia but daughter says she csan hear well. Activities of Daily Living: The home contains: naveen lift Patient needs help with:  transportation, shopping, preparing meals, laundry, housework, managing medications, managing money, eating, dressing, bathing, hygiene, bathroom needs and walking Ambulation: wheelchair bound Fall Risk: 
Fall Risk Assessment, last 12 mths 12/17/2019 Able to walk? Yes Fall in past 12 months? No  
Fall with injury? -  
Number of falls in past 12 months - Fall Risk Score -  
 
 
Abuse Screen: 
Patient is not abused Cognitive Screening Has your family/caregiver stated any concerns about your memory: yes - sees neurology Patient Care Team  
Patient Care Team: DIAN Estes as PCP - General (Physician Assistant) DIAN Estes as PCP - [facility] Rupert Mandujano MD as Consulting Provider (Neurology) Giovany Murray MD (Cardiology) Idalmis Robles MD (Ophthalmology) Wilmer Hahn MD (Ophthalmology) Maria Isabel Munoz DPM (Podiatry) Assessment/Plan Education and counseling provided: 
Are appropriate based on today's review and evaluation ICD-10-CM ICD-9-CM 1. Medicare annual wellness visit, subsequent Z00.00 V70.0 2. Advanced directives, counseling/discussion Z71.89 V65.49 ADVANCE CARE PLANNING FIRST 30 MINS 3. Screening for depression Z13.31 V79.0 Umair Dahl Age and sex specific counseling. Screening for depression and alcoholism. Advanced directives / end of life planning discussion - See ACP notes. Care team updated. Vaccines are administered. Medicare recommended screening and prevention test table is reviewed but due to age and overall prognosis, several preventative tests are not indicated like mammography and colonoscopy. Patient understands our medical plan. Patient has provided input and agrees with goals. All questions answered. Follow-up and Dispositions · Return in about 1 year (around 12/17/2020) for Medicare Wellness exam .

## 2019-12-17 NOTE — PATIENT INSTRUCTIONS
Vaccine Information Statement    Influenza (Flu) Vaccine (Inactivated or Recombinant): What You Need to Know    Many Vaccine Information Statements are available in [place] sor[place] and other languages. See www.immunize.org/vis  Hojas de información sobre vacunas están disponibles en español y en muchos otros idiomas. Visite www.immunize.org/vis    1. Why get vaccinated? Influenza vaccine can prevent influenza (flu). Flu is a contagious disease that spreads around the [facility] every year, usually between October and May. Anyone can get the flu, but it is more dangerous for some people. Infants and young children, people 72years of age and older, pregnant women, and people with certain health conditions or a weakened immune system are at greatest risk of flu complications. Pneumonia, bronchitis, sinus infections and ear infections are examples of flu-related complications. If you have a medical condition, such as heart disease, cancer or diabetes, flu can make it worse. Flu can cause fever and chills, sore throat, muscle aches, fatigue, cough, headache, and runny or stuffy nose. Some people may have vomiting and diarrhea, though this is more common in children than adults. Each year thousands of people in the [facility] die from flu, and many more are hospitalized. Flu vaccine prevents millions of illnesses and flu-related visits to the doctor each year. 2. Influenza vaccines     CDC recommends everyone 10months of age and older get vaccinated every flu season. Children 6 months through 6years of age may need 2 doses during a single flu season. Everyone else needs only 1 dose each flu season. It takes about 2 weeks for protection to develop after vaccination. There are many flu viruses, and they are always changing. Each year a new flu vaccine is made to protect against three or four viruses that are likely to cause disease in the upcoming flu season.  Even when the vaccine doesnt exactly match these viruses, it may still provide some protection. Influenza vaccine does not cause flu. Influenza vaccine may be given at the same time as other vaccines. 3. Talk with your health care provider    Tell your vaccine provider if the person getting the vaccine:   Has had an allergic reaction after a previous dose of influenza vaccine, or has any severe, life-threatening allergies.  Has ever had Guillain-Barré Syndrome (also called GBS). In some cases, your health care provider may decide to postpone influenza vaccination to a future visit. People with minor illnesses, such as a cold, may be vaccinated. People who are moderately or severely ill should usually wait until they recover before getting influenza vaccine. Your health care provider can give you more information. 4. Risks of a reaction     Soreness, redness, and swelling where shot is given, fever, muscle aches, and headache can happen after influenza vaccine.  There may be a very small increased risk of Guillain-Barré Syndrome (GBS) after inactivated influenza vaccine (the flu shot). Annie Paredes children who get the flu shot along with pneumococcal vaccine (PCV13), and/or DTaP vaccine at the same time might be slightly more likely to have a seizure caused by fever. Tell your health care provider if a child who is getting flu vaccine has ever had a seizure. People sometimes faint after medical procedures, including vaccination. Tell your provider if you feel dizzy or have vision changes or ringing in the ears. As with any medicine, there is a very remote chance of a vaccine causing a severe allergic reaction, other serious injury, or death. 5. What if there is a serious problem? An allergic reaction could occur after the vaccinated person leaves the clinic.  If you see signs of a severe allergic reaction (hives, swelling of the face and throat, difficulty breathing, a fast heartbeat, dizziness, or weakness), call 9-1-1 and get the person to the nearest hospital.    For other signs that concern you, call your health care provider. Adverse reactions should be reported to the Vaccine Adverse Event Reporting System (VAERS). Your health care provider will usually file this report, or you can do it yourself. Visit the VAERS website at www.vaers. [facility].gov or call 4-472.569.1872. VAERS is only for reporting reactions, and VAERS staff do not give medical advice. 6. The National Vaccine Injury Compensation Program    The [facility] Vaccine Injury Compensation Program (VICP) is a federal program that was created to compensate people who may have been injured by certain vaccines. Visit the VICP website at www.hrsa.gov/vaccinecompensation or call 9-647.272.5233 to learn about the program and about filing a claim. There is a time limit to file a claim for compensation. 7. How can I learn more?  Ask your health care provider.  Call your local or state health department.  Contact the Centers for Disease Control and Prevention (CDC):  - Call 3-665.907.7537 (1-800-CDC-INFO) or  - Visit CDCs influenza website at www.cdc.gov/flu    Vaccine Information Statement (Interim)  Inactivated Influenza Vaccine   8/15/2019  [address] [place] [place] [id number]   Department of Health and Human Services  Centers for Disease Control and Prevention    Office Use Only      Vaccine Information Statement    Pneumococcal Polysaccharide Vaccine (PPSV23): What You Need to Know    Many Vaccine Information Statements are available in Maltese and other languages. See www.immunize.org/vis  Hojas de información sobre vacunas están disponibles en español y en muchos otros idiomas. Visite www.immunize.org/vis    1. Why get vaccinated? Pneumococcal polysaccharide vaccine (PPSV23) can prevent pneumococcal disease. Pneumococcal disease refers to any illness caused by pneumococcal bacteria.  These bacteria can cause many types of illnesses, including pneumonia, which is an infection of the lungs. Pneumococcal bacteria are one of the most common causes of pneumonia. Besides pneumonia, pneumococcal bacteria can also cause:   Ear infections   Sinus infections   Meningitis (infection of the tissue covering the brain and spinal cord)   Bacteremia (bloodstream infection)    Anyone can get pneumococcal disease, but children under 3years of age, people with certain medical conditions, adults 72 years or older, and cigarette smokers are at the highest risk. Most pneumococcal infections are mild. However, some can result in long-term problems, such as brain damage or hearing loss. Meningitis, bacteremia, and pneumonia caused by pneumococcal disease can be fatal.     2. PPSV23     PPSV23 protects against 23 types of bacteria that cause pneumococcal disease. PPSV23 is recommended for:   All adults 72 years or older,   Anyone 2 years or older with certain medical conditions that can lead to an increased risk for pneumococcal disease. Most people need only one dose of PPSV23. A second dose of PPSV23, and another type of pneumococcal vaccine called PCV13, are recommended for certain high-risk groups. Your health care provider can give you more information. People 65 years or older should get a dose of PPSV23 even if they have already gotten one or more doses of the vaccine before they turned 72.    3. Talk with your health care provider    Tell your vaccine provider if the person getting the vaccine:   Has had an allergic reaction after a previous dose of PPSV23, or has any severe, life-threatening allergies. In some cases, your health care provider may decide to postpone PPSV23 vaccination to a future visit. People with minor illnesses, such as a cold, may be vaccinated. People who are moderately or severely ill should usually wait until they recover before getting PPSV23. Your health care provider can give you more information.     4. Risks of a vaccine reaction     Redness or pain where the shot is given, feeling tired, fever, or muscle aches can happen after PPSV23. People sometimes faint after medical procedures, including vaccination. Tell your provider if you feel dizzy or have vision changes or ringing in the ears. As with any medicine, there is a very remote chance of a vaccine causing a severe allergic reaction, other serious injury, or death. 5. What if there is a serious problem? An allergic reaction could occur after the vaccinated person leaves the clinic. If you see signs of a severe allergic reaction (hives, swelling of the face and throat, difficulty breathing, a fast heartbeat, dizziness, or weakness), call 9-1-1 and get the person to the nearest hospital.    For other signs that concern you, call your health care provider. Adverse reactions should be reported to the Vaccine Adverse Event Reporting System (VAERS). Your health care provider will usually file this report, or you can do it yourself. Visit the VAERS website at www.vaers. hhs.gov or call 0-207.327.8554. VAERS is only for reporting reactions, and VAERS staff do not give medical advice. 6. How can I learn more?  Ask your health care provider.  Call your local or state health department.    Contact the Centers for Disease Control and Prevention (CDC):  - Call 2-421.962.6325 (1-800-CDC-INFO) or  - Visit CDCs website at www.cdc.gov/vaccines    Vaccine Information Statement   PPSV23   10/30/2019    [facility] for Disease Control and Prevention    Office Use Only

## 2019-12-19 NOTE — TELEPHONE ENCOUNTER
Requested Prescriptions     Pending Prescriptions Disp Refills    atorvastatin (LIPITOR) 80 mg tablet 90 Tab 3     Sig: TAKE 1 TABLET EVERY DAY AT BEDTIME

## 2019-12-23 NOTE — TELEPHONE ENCOUNTER
Spoke with Mrs. Martha Elias daughter Manny Mcelroy), who would like for a Home Health for wound care.

## 2019-12-23 NOTE — TELEPHONE ENCOUNTER
Daughter, Massimo Ca called. Needs a wound care nurse to take a look at her pressure sores. This si a recurring issue. Health aide comes out daily from [facility], but nurse only comes out every two months.

## 2019-12-30 NOTE — TELEPHONE ENCOUNTER
Nurse to advise Qamar Perez that we have ordered a culture and sensitivity but we have to be careful not to treat asymptomatic bacteria too aggressively as the treatment risks can outweigh the benefit of getting rid of bacteria that is to be expected in someone who is incontinent. We tend to rely more on symptoms like pain, fevers, confusion. How are they planning on obtaining a clean catch?

## 2019-12-30 NOTE — TELEPHONE ENCOUNTER
[facility] from [address] states that pt might have possible UTI and would like get an order for a culture sensitivity and urinalysis. Pt wears diapers and daughter smells a foul odor. Please advise.

## 2019-12-30 NOTE — TELEPHONE ENCOUNTER
Spoke with Davis Wisam. She was advised that we have ordered a culture and sensitivity but we have to be careful not to treat asymptomatic bacteria too aggressively as the treatment risks can outweigh the benefit of getting rid of bacteria that is to be expected in someone who is incontinent. We tend to rely more on symptoms like pain, fevers, confusion. Davis Joel voices understanding. Per Davis Joel patient is not exhiniting non verbal cues indicating pain, patient is demented so it is unclear if she is confused from a UTI or her diagnosis, and there is no fever. Davis Aldridgenelson reports that she has not witnessed foul smelling urine while completing patient's dressing changes.        How are they planning on obtaining a clean catch?  Via straight catheter

## 2020-01-01 ENCOUNTER — HOME CARE VISIT (OUTPATIENT)
Dept: HOSPICE | Facility: HOSPICE | Age: 85
End: 2020-01-01
Payer: MEDICARE

## 2020-01-01 ENCOUNTER — HOME CARE VISIT (OUTPATIENT)
Dept: HOME HEALTH SERVICES | Facility: HOME HEALTH | Age: 85
End: 2020-01-01
Payer: MEDICARE

## 2020-01-01 ENCOUNTER — TELEPHONE (OUTPATIENT)
Dept: FAMILY MEDICINE CLINIC | Age: 85
End: 2020-01-01

## 2020-01-01 ENCOUNTER — HOME CARE VISIT (OUTPATIENT)
Dept: SCHEDULING | Facility: HOME HEALTH | Age: 85
End: 2020-01-01
Payer: MEDICARE

## 2020-01-01 ENCOUNTER — APPOINTMENT (OUTPATIENT)
Dept: GENERAL RADIOLOGY | Age: 85
DRG: 871 | End: 2020-01-01
Attending: INTERNAL MEDICINE
Payer: MEDICARE

## 2020-01-01 ENCOUNTER — HOSPITAL ENCOUNTER (INPATIENT)
Age: 85
LOS: 5 days | Discharge: HOSPICE | DRG: 871 | End: 2020-01-30
Attending: EMERGENCY MEDICINE | Admitting: HOSPITALIST
Payer: MEDICARE

## 2020-01-01 ENCOUNTER — PATIENT OUTREACH (OUTPATIENT)
Dept: CASE MANAGEMENT | Age: 85
End: 2020-01-01

## 2020-01-01 ENCOUNTER — HOSPICE ADMISSION (OUTPATIENT)
Dept: HOSPICE | Facility: HOSPICE | Age: 85
End: 2020-01-01
Payer: MEDICARE

## 2020-01-01 ENCOUNTER — HOSPITAL ENCOUNTER (OUTPATIENT)
Dept: LAB | Age: 85
Discharge: HOME | End: 2020-01-21
Payer: MEDICAID

## 2020-01-01 ENCOUNTER — APPOINTMENT (OUTPATIENT)
Dept: CT IMAGING | Age: 85
DRG: 871 | End: 2020-01-01
Attending: EMERGENCY MEDICINE
Payer: MEDICARE

## 2020-01-01 ENCOUNTER — APPOINTMENT (OUTPATIENT)
Dept: GENERAL RADIOLOGY | Age: 85
DRG: 871 | End: 2020-01-01
Attending: HOSPITALIST
Payer: MEDICARE

## 2020-01-01 ENCOUNTER — OFFICE VISIT (OUTPATIENT)
Dept: FAMILY MEDICINE CLINIC | Age: 85
End: 2020-01-01

## 2020-01-01 ENCOUNTER — APPOINTMENT (OUTPATIENT)
Dept: GENERAL RADIOLOGY | Age: 85
DRG: 871 | End: 2020-01-01
Attending: EMERGENCY MEDICINE
Payer: MEDICARE

## 2020-01-01 VITALS
SYSTOLIC BLOOD PRESSURE: 100 MMHG | BODY MASS INDEX: 16.4 KG/M2 | OXYGEN SATURATION: 100 % | RESPIRATION RATE: 14 BRPM | TEMPERATURE: 96 F | WEIGHT: 104.7 LBS | DIASTOLIC BLOOD PRESSURE: 57 MMHG | HEART RATE: 84 BPM

## 2020-01-01 VITALS — TEMPERATURE: 98 F | SYSTOLIC BLOOD PRESSURE: 98 MMHG | DIASTOLIC BLOOD PRESSURE: 58 MMHG | HEART RATE: 88 BPM

## 2020-01-01 VITALS
DIASTOLIC BLOOD PRESSURE: 59 MMHG | OXYGEN SATURATION: 98 % | TEMPERATURE: 96.2 F | HEART RATE: 79 BPM | RESPIRATION RATE: 12 BRPM | SYSTOLIC BLOOD PRESSURE: 111 MMHG

## 2020-01-01 VITALS
HEART RATE: 65 BPM | OXYGEN SATURATION: 98 % | SYSTOLIC BLOOD PRESSURE: 144 MMHG | TEMPERATURE: 97.6 F | RESPIRATION RATE: 14 BRPM | DIASTOLIC BLOOD PRESSURE: 76 MMHG

## 2020-01-01 VITALS
DIASTOLIC BLOOD PRESSURE: 78 MMHG | RESPIRATION RATE: 18 BRPM | SYSTOLIC BLOOD PRESSURE: 128 MMHG | HEART RATE: 80 BPM | OXYGEN SATURATION: 99 % | TEMPERATURE: 97.9 F

## 2020-01-01 VITALS
HEART RATE: 80 BPM | RESPIRATION RATE: 16 BRPM | OXYGEN SATURATION: 98 % | HEART RATE: 70 BPM | DIASTOLIC BLOOD PRESSURE: 78 MMHG | TEMPERATURE: 97.8 F | HEART RATE: 80 BPM | RESPIRATION RATE: 18 BRPM | SYSTOLIC BLOOD PRESSURE: 122 MMHG | OXYGEN SATURATION: 98 % | SYSTOLIC BLOOD PRESSURE: 122 MMHG | OXYGEN SATURATION: 96 % | DIASTOLIC BLOOD PRESSURE: 80 MMHG | DIASTOLIC BLOOD PRESSURE: 80 MMHG | TEMPERATURE: 98.1 F | TEMPERATURE: 98.2 F | RESPIRATION RATE: 18 BRPM | SYSTOLIC BLOOD PRESSURE: 122 MMHG

## 2020-01-01 VITALS
SYSTOLIC BLOOD PRESSURE: 110 MMHG | HEART RATE: 83 BPM | OXYGEN SATURATION: 90 % | DIASTOLIC BLOOD PRESSURE: 72 MMHG | RESPIRATION RATE: 20 BRPM | TEMPERATURE: 96.8 F

## 2020-01-01 VITALS
DIASTOLIC BLOOD PRESSURE: 58 MMHG | HEART RATE: 85 BPM | RESPIRATION RATE: 22 BRPM | OXYGEN SATURATION: 96 % | SYSTOLIC BLOOD PRESSURE: 100 MMHG

## 2020-01-01 VITALS
RESPIRATION RATE: 14 BRPM | OXYGEN SATURATION: 97 % | TEMPERATURE: 97.9 F | BODY MASS INDEX: 20.09 KG/M2 | HEART RATE: 60 BPM | SYSTOLIC BLOOD PRESSURE: 108 MMHG | WEIGHT: 128 LBS | DIASTOLIC BLOOD PRESSURE: 70 MMHG | HEIGHT: 67 IN

## 2020-01-01 VITALS — HEART RATE: 59 BPM | OXYGEN SATURATION: 95 % | TEMPERATURE: 97.5 F

## 2020-01-01 VITALS
TEMPERATURE: 98.2 F | DIASTOLIC BLOOD PRESSURE: 67 MMHG | SYSTOLIC BLOOD PRESSURE: 132 MMHG | RESPIRATION RATE: 20 BRPM | HEART RATE: 58 BPM | OXYGEN SATURATION: 98 %

## 2020-01-01 DIAGNOSIS — N17.9 ACUTE RENAL FAILURE, UNSPECIFIED ACUTE RENAL FAILURE TYPE (HCC): ICD-10-CM

## 2020-01-01 DIAGNOSIS — L89.45: ICD-10-CM

## 2020-01-01 DIAGNOSIS — E43 SEVERE PROTEIN-CALORIE MALNUTRITION (HCC): ICD-10-CM

## 2020-01-01 DIAGNOSIS — R62.7 FAILURE TO THRIVE IN ADULT: ICD-10-CM

## 2020-01-01 DIAGNOSIS — M72.6 NECROTIZING FASCIITIS OF MULTIPLE SITES (HCC): ICD-10-CM

## 2020-01-01 DIAGNOSIS — E86.0 SEVERE DEHYDRATION: ICD-10-CM

## 2020-01-01 DIAGNOSIS — F03.91 DEMENTIA WITH BEHAVIORAL DISTURBANCE, UNSPECIFIED DEMENTIA TYPE: Primary | ICD-10-CM

## 2020-01-01 DIAGNOSIS — L89.45: Primary | ICD-10-CM

## 2020-01-01 DIAGNOSIS — R53.81 DEBILITY: ICD-10-CM

## 2020-01-01 DIAGNOSIS — E46 CALORIC MALNUTRITION (HCC): ICD-10-CM

## 2020-01-01 DIAGNOSIS — F03.90 DEMENTIA WITHOUT BEHAVIORAL DISTURBANCE, UNSPECIFIED DEMENTIA TYPE: ICD-10-CM

## 2020-01-01 DIAGNOSIS — R62.7 ADULT FAILURE TO THRIVE: ICD-10-CM

## 2020-01-01 DIAGNOSIS — Z51.5 HOSPICE CARE: ICD-10-CM

## 2020-01-01 DIAGNOSIS — D72.829 LEUKOCYTOSIS, UNSPECIFIED TYPE: ICD-10-CM

## 2020-01-01 DIAGNOSIS — J18.9 COMMUNITY ACQUIRED PNEUMONIA OF RIGHT MIDDLE LOBE OF LUNG: Primary | ICD-10-CM

## 2020-01-01 DIAGNOSIS — L89.150 PRESSURE INJURY OF SACRAL REGION, UNSTAGEABLE (HCC): ICD-10-CM

## 2020-01-01 DIAGNOSIS — Z71.89 GOALS OF CARE, COUNSELING/DISCUSSION: ICD-10-CM

## 2020-01-01 DIAGNOSIS — L89.90 PRESSURE INJURY OF SKIN, UNSPECIFIED INJURY STAGE, UNSPECIFIED LOCATION: ICD-10-CM

## 2020-01-01 LAB
ALBUMIN SERPL-MCNC: 1.8 G/DL (ref 3.4–5)
ALBUMIN/GLOB SERPL: 0.3 {RATIO} (ref 0.8–1.7)
ALP SERPL-CCNC: 111 U/L (ref 45–117)
ALT SERPL-CCNC: 16 U/L (ref 13–56)
AMORPH CRY URNS QL MICRO: ABNORMAL
ANION GAP SERPL CALC-SCNC: 10 MMOL/L (ref 3–18)
ANION GAP SERPL CALC-SCNC: 11 MMOL/L (ref 3–18)
ANION GAP SERPL CALC-SCNC: 11 MMOL/L (ref 3–18)
ANION GAP SERPL CALC-SCNC: 16 MMOL/L (ref 3–18)
ANION GAP SERPL CALC-SCNC: 9 MMOL/L (ref 3–18)
APPEARANCE UR: ABNORMAL
ARTERIAL PATENCY WRIST A: YES
AST SERPL-CCNC: 31 U/L (ref 10–38)
BACTERIA SPEC CULT: ABNORMAL
BACTERIA SPEC CULT: NORMAL
BACTERIA SPEC CULT: NORMAL
BACTERIA URNS QL MICRO: ABNORMAL /HPF
BASE DEFICIT BLD-SCNC: 6 MMOL/L
BASOPHILS # BLD: 0 K/UL (ref 0–0.06)
BASOPHILS NFR BLD: 0 % (ref 0–3)
BDY SITE: ABNORMAL
BILIRUB SERPL-MCNC: 0.9 MG/DL (ref 0.2–1)
BILIRUB UR QL: ABNORMAL
BUN SERPL-MCNC: 111 MG/DL (ref 7–18)
BUN SERPL-MCNC: 117 MG/DL (ref 7–18)
BUN SERPL-MCNC: 121 MG/DL (ref 7–18)
BUN SERPL-MCNC: 121 MG/DL (ref 7–18)
BUN SERPL-MCNC: 128 MG/DL (ref 7–18)
BUN/CREAT SERPL: 35 (ref 12–20)
BUN/CREAT SERPL: 37 (ref 12–20)
BUN/CREAT SERPL: 39 (ref 12–20)
BUN/CREAT SERPL: 43 (ref 12–20)
BUN/CREAT SERPL: 45 (ref 12–20)
CALCIUM SERPL-MCNC: 8.3 MG/DL (ref 8.5–10.1)
CALCIUM SERPL-MCNC: 8.3 MG/DL (ref 8.5–10.1)
CALCIUM SERPL-MCNC: 8.6 MG/DL (ref 8.5–10.1)
CALCIUM SERPL-MCNC: 8.9 MG/DL (ref 8.5–10.1)
CALCIUM SERPL-MCNC: 9.6 MG/DL (ref 8.5–10.1)
CHLORIDE SERPL-SCNC: 115 MMOL/L (ref 100–111)
CHLORIDE SERPL-SCNC: 117 MMOL/L (ref 100–111)
CHLORIDE SERPL-SCNC: 118 MMOL/L (ref 100–111)
CHLORIDE SERPL-SCNC: 120 MMOL/L (ref 100–111)
CHLORIDE SERPL-SCNC: 123 MMOL/L (ref 100–111)
CO2 SERPL-SCNC: 16 MMOL/L (ref 21–32)
CO2 SERPL-SCNC: 18 MMOL/L (ref 21–32)
CO2 SERPL-SCNC: 20 MMOL/L (ref 21–32)
CO2 SERPL-SCNC: 21 MMOL/L (ref 21–32)
CO2 SERPL-SCNC: 25 MMOL/L (ref 21–32)
COLOR UR: ABNORMAL
CREAT SERPL-MCNC: 2.47 MG/DL (ref 0.6–1.3)
CREAT SERPL-MCNC: 2.84 MG/DL (ref 0.6–1.3)
CREAT SERPL-MCNC: 3.1 MG/DL (ref 0.6–1.3)
CREAT SERPL-MCNC: 3.13 MG/DL (ref 0.6–1.3)
CREAT SERPL-MCNC: 3.7 MG/DL (ref 0.6–1.3)
DIFFERENTIAL METHOD BLD: ABNORMAL
EOSINOPHIL # BLD: 0 K/UL (ref 0–0.4)
EOSINOPHIL NFR BLD: 0 % (ref 0–5)
EPITH CASTS URNS QL MICRO: ABNORMAL /LPF (ref 0–5)
ERYTHROCYTE [DISTWIDTH] IN BLOOD BY AUTOMATED COUNT: 13.9 % (ref 11.6–14.5)
ERYTHROCYTE [DISTWIDTH] IN BLOOD BY AUTOMATED COUNT: 14.1 % (ref 11.6–14.5)
ERYTHROCYTE [DISTWIDTH] IN BLOOD BY AUTOMATED COUNT: 14.5 % (ref 11.6–14.5)
GAS FLOW.O2 O2 DELIVERY SYS: ABNORMAL L/MIN
GLOBULIN SER CALC-MCNC: 5.3 G/DL (ref 2–4)
GLUCOSE BLD STRIP.AUTO-MCNC: 100 MG/DL (ref 70–110)
GLUCOSE BLD STRIP.AUTO-MCNC: 100 MG/DL (ref 70–110)
GLUCOSE BLD STRIP.AUTO-MCNC: 102 MG/DL (ref 70–110)
GLUCOSE BLD STRIP.AUTO-MCNC: 111 MG/DL (ref 70–110)
GLUCOSE BLD STRIP.AUTO-MCNC: 116 MG/DL (ref 70–110)
GLUCOSE BLD STRIP.AUTO-MCNC: 131 MG/DL (ref 70–110)
GLUCOSE BLD STRIP.AUTO-MCNC: 140 MG/DL (ref 70–110)
GLUCOSE BLD STRIP.AUTO-MCNC: 143 MG/DL (ref 70–110)
GLUCOSE BLD STRIP.AUTO-MCNC: 156 MG/DL (ref 70–110)
GLUCOSE BLD STRIP.AUTO-MCNC: 166 MG/DL (ref 70–110)
GLUCOSE BLD STRIP.AUTO-MCNC: 169 MG/DL (ref 70–110)
GLUCOSE BLD STRIP.AUTO-MCNC: 47 MG/DL (ref 70–110)
GLUCOSE SERPL-MCNC: 113 MG/DL (ref 74–99)
GLUCOSE SERPL-MCNC: 167 MG/DL (ref 74–99)
GLUCOSE SERPL-MCNC: 265 MG/DL (ref 74–99)
GLUCOSE SERPL-MCNC: 89 MG/DL (ref 74–99)
GLUCOSE SERPL-MCNC: 95 MG/DL (ref 74–99)
GLUCOSE UR STRIP.AUTO-MCNC: NEGATIVE MG/DL
GRAM STN SPEC: ABNORMAL
HCO3 BLD-SCNC: 17.7 MMOL/L (ref 22–26)
HCT VFR BLD AUTO: 20.2 % (ref 35–45)
HCT VFR BLD AUTO: 26 % (ref 35–45)
HCT VFR BLD AUTO: 30.4 % (ref 35–45)
HGB BLD-MCNC: 10.8 G/DL (ref 12–16)
HGB BLD-MCNC: 7.1 G/DL (ref 12–16)
HGB BLD-MCNC: 9.1 G/DL (ref 12–16)
HGB UR QL STRIP: ABNORMAL
KETONES UR QL STRIP.AUTO: NEGATIVE MG/DL
LACTATE BLD-SCNC: 1.26 MMOL/L (ref 0.4–2)
LACTATE BLD-SCNC: 2.02 MMOL/L (ref 0.4–2)
LEUKOCYTE ESTERASE UR QL STRIP.AUTO: ABNORMAL
LYMPHOCYTES # BLD: 0 K/UL (ref 0.8–3.5)
LYMPHOCYTES # BLD: 0.3 K/UL (ref 0.8–3.5)
LYMPHOCYTES # BLD: 0.9 K/UL (ref 0.8–3.5)
LYMPHOCYTES NFR BLD: 0 % (ref 20–51)
LYMPHOCYTES NFR BLD: 1 % (ref 20–51)
LYMPHOCYTES NFR BLD: 3 % (ref 20–51)
MAGNESIUM SERPL-MCNC: 2.2 MG/DL (ref 1.6–2.6)
MAGNESIUM SERPL-MCNC: 2.8 MG/DL (ref 1.6–2.6)
MCH RBC QN AUTO: 29.7 PG (ref 24–34)
MCH RBC QN AUTO: 29.7 PG (ref 24–34)
MCH RBC QN AUTO: 30.5 PG (ref 24–34)
MCHC RBC AUTO-ENTMCNC: 35 G/DL (ref 31–37)
MCHC RBC AUTO-ENTMCNC: 35.1 G/DL (ref 31–37)
MCHC RBC AUTO-ENTMCNC: 35.5 G/DL (ref 31–37)
MCV RBC AUTO: 84.5 FL (ref 74–97)
MCV RBC AUTO: 85 FL (ref 74–97)
MCV RBC AUTO: 85.9 FL (ref 74–97)
METAMYELOCYTES NFR BLD MANUAL: 2 %
MONOCYTES # BLD: 0.3 K/UL (ref 0–1)
MONOCYTES NFR BLD: 1 % (ref 2–9)
NEUTS BAND NFR BLD MANUAL: 11 % (ref 0–5)
NEUTS BAND NFR BLD MANUAL: 6 % (ref 0–5)
NEUTS SEG # BLD: 28.2 K/UL (ref 1.8–8)
NEUTS SEG # BLD: 28.3 K/UL (ref 1.8–8)
NEUTS SEG # BLD: 33.5 K/UL (ref 1.8–8)
NEUTS SEG NFR BLD: 85 % (ref 42–75)
NEUTS SEG NFR BLD: 93 % (ref 42–75)
NEUTS SEG NFR BLD: 96 % (ref 42–75)
NITRITE UR QL STRIP.AUTO: NEGATIVE
PCO2 BLD: 26 MMHG (ref 35–45)
PH BLD: 7.44 [PH] (ref 7.35–7.45)
PH UR STRIP: 5 [PH] (ref 5–8)
PHOSPHATE SERPL-MCNC: 3.7 MG/DL (ref 2.5–4.9)
PLATELET # BLD AUTO: 115 K/UL (ref 135–420)
PLATELET # BLD AUTO: 161 K/UL (ref 135–420)
PLATELET # BLD AUTO: 34 K/UL (ref 135–420)
PLATELET COMMENTS,PCOM: ABNORMAL
PMV BLD AUTO: 11.2 FL (ref 9.2–11.8)
PMV BLD AUTO: 11.3 FL (ref 9.2–11.8)
PO2 BLD: 123 MMHG (ref 80–100)
POTASSIUM SERPL-SCNC: 3.1 MMOL/L (ref 3.5–5.5)
POTASSIUM SERPL-SCNC: 3.6 MMOL/L (ref 3.5–5.5)
POTASSIUM SERPL-SCNC: 3.8 MMOL/L (ref 3.5–5.5)
POTASSIUM SERPL-SCNC: 3.8 MMOL/L (ref 3.5–5.5)
POTASSIUM SERPL-SCNC: 4.5 MMOL/L (ref 3.5–5.5)
PROT SERPL-MCNC: 7.1 G/DL (ref 6.4–8.2)
PROT UR STRIP-MCNC: 30 MG/DL
RBC # BLD AUTO: 2.39 M/UL (ref 4.2–5.3)
RBC # BLD AUTO: 3.06 M/UL (ref 4.2–5.3)
RBC # BLD AUTO: 3.54 M/UL (ref 4.2–5.3)
RBC #/AREA URNS HPF: ABNORMAL /HPF (ref 0–5)
RBC MORPH BLD: ABNORMAL
SAO2 % BLD: 99 % (ref 92–97)
SERVICE CMNT-IMP: ABNORMAL
SERVICE CMNT-IMP: NORMAL
SERVICE CMNT-IMP: NORMAL
SODIUM SERPL-SCNC: 147 MMOL/L (ref 136–145)
SODIUM SERPL-SCNC: 154 MMOL/L (ref 136–145)
SODIUM SERPL-SCNC: 155 MMOL/L (ref 136–145)
SP GR UR REFRACTOMETRY: 1.02 (ref 1–1.03)
SPECIMEN TYPE: ABNORMAL
TOTAL RESP. RATE, ITRR: 32
UROBILINOGEN UR QL STRIP.AUTO: 1 EU/DL (ref 0.2–1)
VANCOMYCIN SERPL-MCNC: 12.1 UG/ML (ref 5–40)
VANCOMYCIN SERPL-MCNC: 17.5 UG/ML (ref 5–40)
VANCOMYCIN SERPL-MCNC: 20.7 UG/ML (ref 5–40)
WBC # BLD AUTO: 29.4 K/UL (ref 4.6–13.2)
WBC # BLD AUTO: 29.5 K/UL (ref 4.6–13.2)
WBC # BLD AUTO: 33.8 K/UL (ref 4.6–13.2)
WBC MORPH BLD: ABNORMAL
WBC URNS QL MICRO: ABNORMAL /HPF (ref 0–4)

## 2020-01-01 PROCEDURE — 82962 GLUCOSE BLOOD TEST: CPT

## 2020-01-01 PROCEDURE — 3331090001 HH PPS REVENUE CREDIT

## 2020-01-01 PROCEDURE — 74011000250 HC RX REV CODE- 250: Performed by: HOSPITALIST

## 2020-01-01 PROCEDURE — 3331090002 HH PPS REVENUE DEBIT

## 2020-01-01 PROCEDURE — 3336500001 HSPC ELECTION

## 2020-01-01 PROCEDURE — 74011250636 HC RX REV CODE- 250/636: Performed by: FAMILY MEDICINE

## 2020-01-01 PROCEDURE — 83605 ASSAY OF LACTIC ACID: CPT

## 2020-01-01 PROCEDURE — 85025 COMPLETE CBC W/AUTO DIFF WBC: CPT

## 2020-01-01 PROCEDURE — 74011000258 HC RX REV CODE- 258: Performed by: HOSPITALIST

## 2020-01-01 PROCEDURE — 71045 X-RAY EXAM CHEST 1 VIEW: CPT

## 2020-01-01 PROCEDURE — G0299 HHS/HOSPICE OF RN EA 15 MIN: HCPCS

## 2020-01-01 PROCEDURE — 87186 SC STD MICRODIL/AGAR DIL: CPT

## 2020-01-01 PROCEDURE — A6196 ALGINATE DRESSING <=16 SQ IN: HCPCS

## 2020-01-01 PROCEDURE — 0651 HSPC ROUTINE HOME CARE

## 2020-01-01 PROCEDURE — 51702 INSERT TEMP BLADDER CATH: CPT

## 2020-01-01 PROCEDURE — T4541 LARGE DISPOSABLE UNDERPAD: HCPCS

## 2020-01-01 PROCEDURE — 80202 ASSAY OF VANCOMYCIN: CPT

## 2020-01-01 PROCEDURE — HOSPICE MEDICATION HC HH HOSPICE MEDICATION

## 2020-01-01 PROCEDURE — A4927 NON-STERILE GLOVES: HCPCS

## 2020-01-01 PROCEDURE — 80048 BASIC METABOLIC PNL TOTAL CA: CPT

## 2020-01-01 PROCEDURE — G0300 HHS/HOSPICE OF LPN EA 15 MIN: HCPCS

## 2020-01-01 PROCEDURE — 77010033711 HC HIGH FLOW OXYGEN

## 2020-01-01 PROCEDURE — 84100 ASSAY OF PHOSPHORUS: CPT

## 2020-01-01 PROCEDURE — G0155 HHCP-SVS OF CSW,EA 15 MIN: HCPCS

## 2020-01-01 PROCEDURE — 74011000250 HC RX REV CODE- 250: Performed by: FAMILY MEDICINE

## 2020-01-01 PROCEDURE — P9047 ALBUMIN (HUMAN), 25%, 50ML: HCPCS | Performed by: HOSPITALIST

## 2020-01-01 PROCEDURE — 65660000000 HC RM CCU STEPDOWN

## 2020-01-01 PROCEDURE — 76450000000

## 2020-01-01 PROCEDURE — 92950 HEART/LUNG RESUSCITATION CPR: CPT

## 2020-01-01 PROCEDURE — 74011636637 HC RX REV CODE- 636/637: Performed by: HOSPITALIST

## 2020-01-01 PROCEDURE — 82803 BLOOD GASES ANY COMBINATION: CPT

## 2020-01-01 PROCEDURE — 74176 CT ABD & PELVIS W/O CONTRAST: CPT

## 2020-01-01 PROCEDURE — 74011250636 HC RX REV CODE- 250/636: Performed by: HOSPITALIST

## 2020-01-01 PROCEDURE — 3331090003 HH PPS REVENUE ADJ

## 2020-01-01 PROCEDURE — 65660000004 HC RM CVT STEPDOWN

## 2020-01-01 PROCEDURE — 87070 CULTURE OTHR SPECIMN AEROBIC: CPT

## 2020-01-01 PROCEDURE — 83735 ASSAY OF MAGNESIUM: CPT

## 2020-01-01 PROCEDURE — 74011000258 HC RX REV CODE- 258: Performed by: INTERNAL MEDICINE

## 2020-01-01 PROCEDURE — A6212 FOAM DRG <=16 SQ IN W/BORDER: HCPCS

## 2020-01-01 PROCEDURE — 36600 WITHDRAWAL OF ARTERIAL BLOOD: CPT

## 2020-01-01 PROCEDURE — 74011000258 HC RX REV CODE- 258: Performed by: FAMILY MEDICINE

## 2020-01-01 PROCEDURE — 74011000258 HC RX REV CODE- 258: Performed by: EMERGENCY MEDICINE

## 2020-01-01 PROCEDURE — 87040 BLOOD CULTURE FOR BACTERIA: CPT

## 2020-01-01 PROCEDURE — A9270 NON-COVERED ITEM OR SERVICE: HCPCS

## 2020-01-01 PROCEDURE — A6242 HYDROGEL DRG <=16 IN W/O BDR: HCPCS

## 2020-01-01 PROCEDURE — 77030018846 HC SOL IRR STRL H20 ICUM -A

## 2020-01-01 PROCEDURE — 36415 COLL VENOUS BLD VENIPUNCTURE: CPT

## 2020-01-01 PROCEDURE — 81001 URINALYSIS AUTO W/SCOPE: CPT

## 2020-01-01 PROCEDURE — 77010033711 HC HIGH FLOW OXYGEN: Performed by: HOSPITALIST

## 2020-01-01 PROCEDURE — 3331090004 HSPC SERVICE INTENSITY ADD-ON

## 2020-01-01 PROCEDURE — 87077 CULTURE AEROBIC IDENTIFY: CPT

## 2020-01-01 PROCEDURE — 80053 COMPREHEN METABOLIC PANEL: CPT

## 2020-01-01 PROCEDURE — 51701 INSERT BLADDER CATHETER: CPT

## 2020-01-01 PROCEDURE — 94762 N-INVAS EAR/PLS OXIMTRY CONT: CPT

## 2020-01-01 PROCEDURE — 74011250636 HC RX REV CODE- 250/636: Performed by: INTERNAL MEDICINE

## 2020-01-01 PROCEDURE — 74011250636 HC RX REV CODE- 250/636: Performed by: EMERGENCY MEDICINE

## 2020-01-01 PROCEDURE — 77030040393 HC DRSG OPTIFOAM GENT MDII -B

## 2020-01-01 PROCEDURE — 99285 EMERGENCY DEPT VISIT HI MDM: CPT

## 2020-01-01 PROCEDURE — 70450 CT HEAD/BRAIN W/O DYE: CPT

## 2020-01-01 PROCEDURE — 77030041247 HC PROTECTOR HEEL HEELMEDIX MDII -B

## 2020-01-01 PROCEDURE — 77030037878 HC DRSG MEPILEX >48IN BORD MOLN -B

## 2020-01-01 PROCEDURE — HHS10554 SHAMPOO/BODY WASH 8 OZ ALOE VESTA

## 2020-01-01 PROCEDURE — A6250 SKIN SEAL PROTECT MOISTURIZR: HCPCS

## 2020-01-01 PROCEDURE — 94761 N-INVAS EAR/PLS OXIMETRY MLT: CPT

## 2020-01-01 RX ORDER — ALBUMIN HUMAN 250 G/1000ML
25 SOLUTION INTRAVENOUS ONCE
Status: COMPLETED | OUTPATIENT
Start: 2020-01-01 | End: 2020-01-01

## 2020-01-01 RX ORDER — VANCOMYCIN/0.9 % SOD CHLORIDE 750 MG/250
750 PLASTIC BAG, INJECTION (ML) INTRAVENOUS ONCE
Status: COMPLETED | OUTPATIENT
Start: 2020-01-01 | End: 2020-01-01

## 2020-01-01 RX ORDER — INSULIN LISPRO 100 [IU]/ML
INJECTION, SOLUTION INTRAVENOUS; SUBCUTANEOUS EVERY 6 HOURS
Status: DISCONTINUED | OUTPATIENT
Start: 2020-01-01 | End: 2020-01-01 | Stop reason: HOSPADM

## 2020-01-01 RX ORDER — POTASSIUM CHLORIDE 7.45 MG/ML
10 INJECTION INTRAVENOUS
Status: ACTIVE | OUTPATIENT
Start: 2020-01-01 | End: 2020-01-01

## 2020-01-01 RX ORDER — ONDANSETRON 2 MG/ML
4 INJECTION INTRAMUSCULAR; INTRAVENOUS
Status: DISCONTINUED | OUTPATIENT
Start: 2020-01-01 | End: 2020-01-01 | Stop reason: HOSPADM

## 2020-01-01 RX ORDER — DORZOLAMIDE HCL 20 MG/ML
2 SOLUTION/ DROPS OPHTHALMIC 3 TIMES DAILY
Status: DISCONTINUED | OUTPATIENT
Start: 2020-01-01 | End: 2020-01-01 | Stop reason: HOSPADM

## 2020-01-01 RX ORDER — SULFAMETHOXAZOLE AND TRIMETHOPRIM 800; 160 MG/1; MG/1
1 TABLET ORAL 2 TIMES DAILY
Qty: 20 TAB | Refills: 0 | Status: CANCELLED | OUTPATIENT
Start: 2020-01-01 | End: 2020-01-01

## 2020-01-01 RX ORDER — SODIUM CHLORIDE 450 MG/100ML
125 INJECTION, SOLUTION INTRAVENOUS CONTINUOUS
Status: DISCONTINUED | OUTPATIENT
Start: 2020-01-01 | End: 2020-01-01

## 2020-01-01 RX ORDER — SODIUM CHLORIDE 9 MG/ML
500 INJECTION, SOLUTION INTRAVENOUS ONCE
Status: COMPLETED | OUTPATIENT
Start: 2020-01-01 | End: 2020-01-01

## 2020-01-01 RX ORDER — MORPHINE SULFATE 20 MG/ML
2.5 SOLUTION ORAL
Qty: 30 ML | Refills: 0 | Status: SHIPPED | OUTPATIENT
Start: 2020-01-01 | End: 2020-02-28

## 2020-01-01 RX ORDER — LATANOPROST 50 UG/ML
1 SOLUTION/ DROPS OPHTHALMIC
Status: DISCONTINUED | OUTPATIENT
Start: 2020-01-01 | End: 2020-01-01 | Stop reason: HOSPADM

## 2020-01-01 RX ORDER — VANCOMYCIN HYDROCHLORIDE
1250 ONCE
Status: COMPLETED | OUTPATIENT
Start: 2020-01-01 | End: 2020-01-01

## 2020-01-01 RX ORDER — LORAZEPAM 2 MG/ML
0.5 CONCENTRATE ORAL
Qty: 30 ML | Refills: 0 | Status: SHIPPED | OUTPATIENT
Start: 2020-01-01

## 2020-01-01 RX ORDER — CLINDAMYCIN HYDROCHLORIDE 300 MG/1
300 CAPSULE ORAL 3 TIMES DAILY
Qty: 30 CAP | Refills: 0 | Status: SHIPPED | OUTPATIENT
Start: 2020-01-01 | End: 2020-01-01

## 2020-01-01 RX ORDER — DEXTROSE MONOHYDRATE AND SODIUM CHLORIDE 5; .45 G/100ML; G/100ML
50 INJECTION, SOLUTION INTRAVENOUS CONTINUOUS
Status: DISCONTINUED | OUTPATIENT
Start: 2020-01-01 | End: 2020-01-01 | Stop reason: HOSPADM

## 2020-01-01 RX ORDER — MORPHINE SULFATE 20 MG/ML
2.5 SOLUTION ORAL
Status: DISCONTINUED | OUTPATIENT
Start: 2020-01-01 | End: 2020-01-01

## 2020-01-01 RX ORDER — ACETAMINOPHEN 650 MG/1
650 SUPPOSITORY RECTAL
Status: DISCONTINUED | OUTPATIENT
Start: 2020-01-01 | End: 2020-01-01 | Stop reason: HOSPADM

## 2020-01-01 RX ORDER — ACETAMINOPHEN 650 MG/1
650 SUPPOSITORY RECTAL
Qty: 15 SUPPOSITORY | Refills: 0 | Status: SHIPPED | OUTPATIENT
Start: 2020-01-01

## 2020-01-01 RX ORDER — DEXTROSE MONOHYDRATE 50 MG/ML
100 INJECTION, SOLUTION INTRAVENOUS CONTINUOUS
Status: DISPENSED | OUTPATIENT
Start: 2020-01-01 | End: 2020-01-01

## 2020-01-01 RX ORDER — SCOLOPAMINE TRANSDERMAL SYSTEM 1 MG/1
1 PATCH, EXTENDED RELEASE TRANSDERMAL
Qty: 5 PATCH | Refills: 0 | Status: SHIPPED | OUTPATIENT
Start: 2020-01-01

## 2020-01-01 RX ORDER — ALBUTEROL SULFATE 0.83 MG/ML
2.5 SOLUTION RESPIRATORY (INHALATION)
Status: ACTIVE | OUTPATIENT
Start: 2020-01-01 | End: 2020-01-01

## 2020-01-01 RX ADMIN — SODIUM CHLORIDE 1000 ML: 900 INJECTION, SOLUTION INTRAVENOUS at 19:09

## 2020-01-01 RX ADMIN — DORZOLAMIDE HYDROCHLORIDE 2 DROP: 20 SOLUTION/ DROPS OPHTHALMIC at 19:30

## 2020-01-01 RX ADMIN — DORZOLAMIDE HYDROCHLORIDE 2 DROP: 20 SOLUTION/ DROPS OPHTHALMIC at 22:26

## 2020-01-01 RX ADMIN — SODIUM CHLORIDE 125 ML/HR: 450 INJECTION, SOLUTION INTRAVENOUS at 21:16

## 2020-01-01 RX ADMIN — DAKIN'S SOLUTION 0.125% (QUARTER STRENGTH): 0.12 SOLUTION at 18:30

## 2020-01-01 RX ADMIN — DEXTROSE MONOHYDRATE AND SODIUM CHLORIDE 75 ML/HR: 5; .45 INJECTION, SOLUTION INTRAVENOUS at 21:49

## 2020-01-01 RX ADMIN — CEFEPIME HYDROCHLORIDE 0.5 G: 1 INJECTION, POWDER, FOR SOLUTION INTRAMUSCULAR; INTRAVENOUS at 21:57

## 2020-01-01 RX ADMIN — DORZOLAMIDE HYDROCHLORIDE 2 DROP: 20 SOLUTION/ DROPS OPHTHALMIC at 21:15

## 2020-01-01 RX ADMIN — AZITHROMYCIN MONOHYDRATE 500 MG: 500 INJECTION, POWDER, LYOPHILIZED, FOR SOLUTION INTRAVENOUS at 21:16

## 2020-01-01 RX ADMIN — CEFTRIAXONE SODIUM 1 G: 1 INJECTION, POWDER, FOR SOLUTION INTRAMUSCULAR; INTRAVENOUS at 21:15

## 2020-01-01 RX ADMIN — Medication 750 MG: at 14:35

## 2020-01-01 RX ADMIN — SODIUM CHLORIDE 1000 MG: 900 INJECTION, SOLUTION INTRAVENOUS at 08:56

## 2020-01-01 RX ADMIN — INSULIN LISPRO 2 UNITS: 100 INJECTION, SOLUTION INTRAVENOUS; SUBCUTANEOUS at 12:00

## 2020-01-01 RX ADMIN — DAKIN'S SOLUTION 0.125% (QUARTER STRENGTH): 0.12 SOLUTION at 10:58

## 2020-01-01 RX ADMIN — CEFTRIAXONE SODIUM 1 G: 1 INJECTION, POWDER, FOR SOLUTION INTRAMUSCULAR; INTRAVENOUS at 21:14

## 2020-01-01 RX ADMIN — DAKIN'S SOLUTION 0.125% (QUARTER STRENGTH): 0.12 SOLUTION at 10:45

## 2020-01-01 RX ADMIN — VANCOMYCIN HYDROCHLORIDE 1250 MG: 10 INJECTION, POWDER, LYOPHILIZED, FOR SOLUTION INTRAVENOUS at 21:16

## 2020-01-01 RX ADMIN — DORZOLAMIDE HYDROCHLORIDE 2 DROP: 20 SOLUTION/ DROPS OPHTHALMIC at 17:21

## 2020-01-01 RX ADMIN — DEXTROSE MONOHYDRATE 100 ML/HR: 5 INJECTION, SOLUTION INTRAVENOUS at 11:00

## 2020-01-01 RX ADMIN — Medication 750 MG: at 13:22

## 2020-01-01 RX ADMIN — DORZOLAMIDE HYDROCHLORIDE 2 DROP: 20 SOLUTION/ DROPS OPHTHALMIC at 09:42

## 2020-01-01 RX ADMIN — SODIUM CHLORIDE 500 ML: 900 INJECTION, SOLUTION INTRAVENOUS at 07:03

## 2020-01-01 RX ADMIN — DEXTROSE MONOHYDRATE 100 ML/HR: 5 INJECTION, SOLUTION INTRAVENOUS at 17:20

## 2020-01-01 RX ADMIN — LATANOPROST 1 DROP: 50 SOLUTION OPHTHALMIC at 22:00

## 2020-01-01 RX ADMIN — DORZOLAMIDE HYDROCHLORIDE 2 DROP: 20 SOLUTION/ DROPS OPHTHALMIC at 10:30

## 2020-01-01 RX ADMIN — DORZOLAMIDE HYDROCHLORIDE 2 DROP: 20 SOLUTION/ DROPS OPHTHALMIC at 21:50

## 2020-01-01 RX ADMIN — SODIUM CHLORIDE 1000 ML: 900 INJECTION, SOLUTION INTRAVENOUS at 20:25

## 2020-01-01 RX ADMIN — CEFEPIME HYDROCHLORIDE 0.5 G: 1 INJECTION, POWDER, FOR SOLUTION INTRAMUSCULAR; INTRAVENOUS at 21:49

## 2020-01-01 RX ADMIN — DAKIN'S SOLUTION 0.125% (QUARTER STRENGTH): 0.12 SOLUTION at 18:34

## 2020-01-01 RX ADMIN — DEXTROSE MONOHYDRATE AND SODIUM CHLORIDE 50 ML/HR: 5; .45 INJECTION, SOLUTION INTRAVENOUS at 10:53

## 2020-01-01 RX ADMIN — DORZOLAMIDE HYDROCHLORIDE 2 DROP: 20 SOLUTION/ DROPS OPHTHALMIC at 18:30

## 2020-01-01 RX ADMIN — DORZOLAMIDE HYDROCHLORIDE 2 DROP: 20 SOLUTION/ DROPS OPHTHALMIC at 10:58

## 2020-01-01 RX ADMIN — DAKIN'S SOLUTION 0.125% (QUARTER STRENGTH): 0.12 SOLUTION at 09:12

## 2020-01-01 RX ADMIN — DAKIN'S SOLUTION 0.125% (QUARTER STRENGTH): 0.12 SOLUTION at 20:20

## 2020-01-01 RX ADMIN — CEFEPIME HYDROCHLORIDE 0.5 G: 1 INJECTION, POWDER, FOR SOLUTION INTRAMUSCULAR; INTRAVENOUS at 22:26

## 2020-01-01 RX ADMIN — DORZOLAMIDE HYDROCHLORIDE 2 DROP: 20 SOLUTION/ DROPS OPHTHALMIC at 21:57

## 2020-01-01 RX ADMIN — SODIUM CHLORIDE, SODIUM ACETATE ANHYDROUS, SODIUM GLUCONATE, POTASSIUM CHLORIDE, AND MAGNESIUM CHLORIDE: 526; 222; 502; 37; 30 INJECTION, SOLUTION INTRAVENOUS at 23:55

## 2020-01-01 RX ADMIN — DORZOLAMIDE HYDROCHLORIDE 2 DROP: 20 SOLUTION/ DROPS OPHTHALMIC at 10:45

## 2020-01-01 RX ADMIN — ALBUMIN (HUMAN) 25 G: 0.25 INJECTION, SOLUTION INTRAVENOUS at 23:55

## 2020-01-01 RX ADMIN — DORZOLAMIDE HYDROCHLORIDE 2 DROP: 20 SOLUTION/ DROPS OPHTHALMIC at 09:09

## 2020-01-01 RX ADMIN — DORZOLAMIDE HYDROCHLORIDE 2 DROP: 20 SOLUTION/ DROPS OPHTHALMIC at 18:34

## 2020-01-01 RX ADMIN — INSULIN LISPRO 2 UNITS: 100 INJECTION, SOLUTION INTRAVENOUS; SUBCUTANEOUS at 01:36

## 2020-01-03 NOTE — TELEPHONE ENCOUNTER
Call received from June at [facility]. Per June she went to see patient for the first time today for dressing change. Patient's wound was described to June as stage 2 bilateral hip ulcer measuring 3 x 5. June reports that ulcers were larger, covered in eschar with slough and unstageable. Patient is afebrile and demented so unable to assess patient's pain. Pictures have been taken and uploaded into chart for physician review and recommendations. Wound care specialists with [address] will not return to office until Monday.

## 2020-01-03 NOTE — TELEPHONE ENCOUNTER
D/W home health - the wound care nurse is looking at the online pictures of the wound and going to make recommendations. They also are going to see her tomorrow and keep us posted.

## 2020-01-13 NOTE — Clinical Note
wound care completed per referral instructions. Wound are malodorous, and covered with eschar . Please assess and consider intervention, possibly antibiotic.   Thanks [employer]

## 2020-01-20 NOTE — TELEPHONE ENCOUNTER
Yes, I will be assessing the wound tomorrow, but in the meantime if they can fax me those orders I am happy to sign off on them to get the ball rolling

## 2020-01-20 NOTE — TELEPHONE ENCOUNTER
Left message via voicemail for Luis at [address] that per HUBERT GARCIA [facility] to fax over her for wound care.

## 2020-01-20 NOTE — TELEPHONE ENCOUNTER
Luis Negro from [address] [place] [address] is requesting an order for a wound culture and a wet to dry dressing daily. Please advise.

## 2020-01-21 NOTE — PROGRESS NOTES
Patient: Nima Rush MRN: [id number]  SSN: [id number] YOB: 1933  Age: 80 y.o. Sex: female Date of Service: 1/21/2020 Provider: DIAN Oneil Office Location:  
[address] [address] [address] [place], [address] Office Phone: [phone] Office Fax: [phone] REASON FOR VISIT:  
Chief Complaint Patient presents with  Wound Check  
  axp 4 wounds located on buttocks VITALS:  
Visit Vitals /70 (BP 1 Location: Left arm, BP Patient Position: Sitting) Pulse 60 Temp 97.9 °F (36.6 °C) (Oral) Resp 14 Ht 5' 7\" (1.702 m) Wt 128 lb (58.1 kg) Comment: axp per daughter SpO2 97% BMI 20.05 kg/m² MEDICATIONS:  
Current Outpatient Medications Medication Sig Dispense Refill  memantine (NAMENDA) 10 mg tablet Take 1 Tab by mouth daily. 1 tab every am 90 Tab 5  MULTIVITAMIN PO Take 1 Tab by mouth daily.  atorvastatin (LIPITOR) 80 mg tablet TAKE 1 TABLET EVERY DAY AT BEDTIME 90 Tab 3  
 raNITIdine (ZANTAC) 150 mg tablet Take 1 Tab by mouth two (2) times a day. 180 Tab 0  
 netarsudil mesylate (RHOPRESSA OP) Administer 1 Drop to both eyes daily.  dorzolamide (TRUSOPT) 2 % ophthalmic solution Administer 2 Drops to both eyes three (3) times daily.  aspirin delayed-release 81 mg tablet take 1 tablet by mouth once daily 30 Tab 6  
 latanoprost (XALATAN) 0.005 % ophthalmic solution Administer 1 Drop to both eyes nightly.  ferrous sulfate 324 mg (65 mg iron) tablet Take 324 mg by mouth daily. 1 tab before breakfast    
 lisinopril (PRINIVIL, ZESTRIL) 5 mg tablet TAKE 1 TABLET EVERY DAY 90 Tab 1  
 hydrocortisone (ALA-DEEDEE) 1 % lotion Apply  to affected area two (2) times a day. use thin layer 60 g 2  
 CALCIUM 600 600 mg calcium (1,500 mg) tablet take 1 tablet by mouth twice a day (Patient taking differently: Pt has 600 mg tabs take 1 tablet by mouth twice a day) 60 Tab 6  acetaminophen (TYLENOL EXTRA STRENGTH) 500 mg tablet Take 500 mg by mouth every six (6) hours as needed for Fever or Pain. 1 tab  peg 400-propylene glycol (SYSTANE) 0.4-0.3 % drop Administer 1 Drop to left eye as needed for Other (PRN Dry eyes). PRN Dry eyes ALLERGIES:  
Allergies Allergen Reactions  Pcn [Penicillins] Other (comments) Unknown-happened when she was very young ACTIVE MEDICAL PROBLEMS: 
Patient Active Problem List  
Diagnosis Code  
 HTN (hypertension) I10  
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  
 History of breast cancer Z85.3  
 OA (osteoarthritis) of knee M17.10  
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  
 Tear of rotator cuff M75.100  Memory loss R41.3  Dementia associated with other underlying disease ([facility] F02.80  Advance directive discussed with patient Z70.80 HISTORY OF PRESENT ILLNESS:  
Nina Martinez is a 80 y.o. female who presents to the office, accompanied by her daughter Nubia Muhamamd, for acute care. Here today for assessment of multiple pressure ulcers to b/l hip/buttocks area and sacrum. Patient has had issues with decubitus ulcers in the past, which resolved quickly with wound care. In December, patient's daughter had called the office requesting a new home health wound care order as the pressure ulcers had returned. Order was placed without examining the patient first, as she is homebound and difficult for her daughter to transport to the office. Recently, we've received some communication from home health expressing concerns that her ulcers are not healing and that there has been some purulent drainage concerning for infection. [facility] was requesting an order for wound cultures, however, since the patient is here in the office today, we can perform the cultures here, so as not to further delay antibiotic treatment.   
 
REVIEW OF SYSTEMS: 
 Patient has dementia and is unable to answer questions. Daughter denies fevers, chills, or other change in appearance/behavior. PHYSICAL EXAMINATION: 
Physical Exam 
Constitutional:   
   Comments: Patient appears chronically ill, but awake and alert, seated in wheelchair. No acute distress. Cardiovascular:  
   Rate and Rhythm: Normal rate and regular rhythm. Heart sounds: Normal heart sounds. No murmur. Pulmonary:  
   Effort: Pulmonary effort is normal. No respiratory distress. Breath sounds: Normal breath sounds. Skin: 
   Comments: large full thickness pressure ulcer to L buttocks, covered with slough and eschar. smaller partial thickness pressure ulcers to R buttocks and sacrum, covered with slough and eschar. RESULTS: 
No results found for this visit on 01/21/20. ASSESSMENT/PLAN: 
Diagnoses and all orders for this visit: 1. Pressure injury of contiguous region involving right buttock and hip, unstageable ([address] 2. Pressure injury of contiguous region involving left buttock and hip, unstageable ([address] 3. Pressure injury of sacral region, unstageable ([address] - Wound cultures sent x 3  
- Start empiric therapy with clindamycin as below (chosen due to PCN allergy and interaction of Bactrim with patient's lisinopril) - Advised patient's daughter/caregiver that we will add or change antibiotics as needed based on sensitivity data - Orders faxed to home health for wet to dry dressings - Patient has not been eating much and I suspect that her poor nutritional status is contributing to poor wound healing more so than infection at this point. Daughter encouraged to continue to offer protein supplement and calories. OK to deviate from diabetic diet temporarily as blood sugars have been well controlled x years, with recent A1c < 5% Orders:   
-     CULTURE, WOUND W GRAM STAIN; Future 
-     clindamycin (CLEOCIN) 300 mg capsule;  Take 1 Cap by mouth three (3) times daily for 10 days. All questions answered. Caregiver expresses understanding and agrees with the plan as detailed above. PATIENT CARE TEAM:  
Patient Care Team: DIAN Swain as PCP - General (Physician Assistant) DI[facility] Swain [facility] P[facility] Artie Mcmanus MD as Consulting Provider (Neurology) Alex Boswell MD (Cardiology) Katharine Shen MD (Ophthalmology) Erin Rivas MD (Ophthalmology) Mansoor Duckworth DPM (Podiatry) DIAN Mancini  
January 21, 2020  
3:06 PM

## 2020-01-21 NOTE — PATIENT INSTRUCTIONS
Pressure Injuries: Care Instructions Your Care Instructions A pressure injury on the skin is caused by constant pressure to that area. These injuriesalso called decubitus ulcers or bedsoresmay happen when you lie in bed or sit in a wheelchair for a long time. The constant pressure blocks the blood supply to the skin. This causes skin cells to die and creates a sore. Pressure injuries usually occur over bony areas, such as the hips, lower back, elbows, heels, and shoulders. They also can occur in places where the skin folds over on itself. You may have mild redness or open sores that are harder to heal. 
Good care at home can help heal pressure injuries. You or your caregiver needs to check your skin every day for sores. You need good nutrition and plenty of fluids to keep your skin healthy and prevent new pressure injuries. Follow-up care is a key part of your treatment and safety. Be sure to make and go to all appointments, and call your doctor if you are having problems. It's also a good idea to know your test results and keep a list of the medicines you take. How can you care for yourself at home? · If your doctor prescribed a medicated ointment or cream, use it exactly as prescribed. Call your doctor if you think you are having a problem with your medicine. · Wash pressure injuries every day, or as often as your doctor recommends. Most tap water is safe, but follow the advice of your doctor or nurse. He or she may recommend that you use a saline solution. This is a salt and water solution that you can buy over the counter. · Put on bandages as your doctor or wound care specialist says. · Keep healthy tissue around the sore clean and dry. · Check your skin every day for sores (or have a caregiver do it). · If you know what is causing the pressure that caused the sore, find a way to remove that pressure. To prevent pressure injuries · Change your position or have your caregiver help you change your position often. You may need to do this every 2 hours if you are in bed or every 15 minutes if you are in a wheelchair. This lowers the chance of making sores worse and getting new sores. · Use special mattresses or other support. These may include low-pressure mattresses or cushions made of foam that can be filled with air, water, beads, or fiber. · Eat healthy foods with plenty of protein to help heal damaged skin and to help new skin grow. · Try to stay at a healthy weight. Being overweight can lead to more pressure on your skin. · Do not slide across sheets or slump in a chair or bed. · Do not smoke. Smoking dries the skin and reduces its blood supply. If you need help quitting, talk to your doctor about stop-smoking programs and medicines. These can increase your chances of quitting for good. When should you call for help? Call your doctor now or seek immediate medical care if: 
  · You have signs of infection, such as: 
? Increased pain, swelling, warmth, or redness. ? Red streaks leading from the sore. ? Pus draining from the sore. ? A fever.  
 Watch closely for changes in your health, and be sure to contact your doctor if: 
  · Your pressure injuries are not healing.  
  · You have new pressure injuries.  
  · You need help changing positions in bed or in a chair.  
  · Your caregiver needs help to move you. Where can you learn more? Go to http://michell-everardo.info/. Enter F114 in the search box to learn more about \"Pressure Injuries: Care Instructions. \" Current as of: September 26, 2018 Content Version: 12.2 © 5055-2554 [employer]. Care instructions adapted under license by [employer] (which disclaims liability or warranty for this information).  If you have questions about a medical condition or this instruction, always ask your healthcare professional. [employer] disclaims any warranty or liability for your use of this information.

## 2020-01-23 NOTE — TELEPHONE ENCOUNTER
Please clarify if she is unable to swallow any pills at this time, or if these pills are just too large. I could try to send a liquid antibiotic if her daughter thinks that would be easier.    Wound culture results are still preliminary

## 2020-01-23 NOTE — TELEPHONE ENCOUNTER
Spoke with  The daughter of Axel Whalen [facility]) she wanted to inform Nubia Esquivel, that the capsules are medium size, her mom is not eating or drinking and that she spoke with the pharmacist and was told to open the capsules and sprinkle medication in applesauce to give Mrs. Chioma Martin. Per Anneliese Sosa she has tried this and her mom just don't swallow and it just sits in her cheeks. When ask would she prefer liquid \"her reply was she don't think, because is mot eating or drinking anything at this point.  Please advise

## 2020-01-23 NOTE — TELEPHONE ENCOUNTER
Before trying to force further antibiotics on her, I would suggest that we simply wait through the weekend and see what the culture results show, to make sure we are choosing the right antibiotic. If Ms. Uche Baptiste begins to appear ill (I.e. fevers, chills, etc) she should to to the ER and could be given antibiotics through an IV. In terms of the bigger picture, it sounds as though she is starting to refuse PO. Maxine Mcneal had mentioned to me that she's been having a hard time getting her mother to eat. It would not be unreasonable to consider a hospice referral to focus on comfort and dignity at this stage. Let me know if she would like to pursue this. Happy to call Maxine Mcneal tomorrow to discuss in greater detail if needed.

## 2020-01-23 NOTE — TELEPHONE ENCOUNTER
Spoke with Mrs. Prema Rodriguez daughter Jenny Reyes) in great length explaining to her that per Azeb Boys at this time before trying to force further antibiotics on her, she suggest that we simply wait through the weekend and see what the culture results show, to make sure we are choosing the right antibiotic.      If Ms. Prema Rodriguez  (her mom) begins to appear ill (I.e. fevers, chills, etc) she should take her to the  ER and could be given antibiotics through an Francenia Nehemiah states In terms of the bigger picture, it sounds as though Mrs. Prema Rodriguez  is starting to refuse PO and at her last visit it was mentioned to her that she's been having a hard time getting her mother to eat. Jenny Reyes agreed that she in fact did mention this at the visit).      It would not be unreasonable to consider a hospice referral to focus on comfort and dignity at this stage Jenny Reyes stated that the home health nurse has mention this to her on Monday while visiting her mom and that she was to get some information for her to read.  Mirza Bates states she would love to talk to Ofe Carty, [address]

## 2020-01-24 NOTE — TELEPHONE ENCOUNTER
Spoke with Porfirio Bonilla, who has been unable to get her mother to eat or drink anything today. She did mention that one of the home health aides showed her a trick that she is going to try momentarily with some vegetable broth. We had a conversation about hospice care and what that would entail. Porfirio Bonilla is agreeable to this, so I will place an order for a home hospice consult. She was advised that she may contact me directly or the on call provider through our answering service over the weekend if she needs any further guidance. Will reach out on Monday to check in and hopefully with the final wound culture results.

## 2020-01-24 NOTE — TELEPHONE ENCOUNTER
Left message for June ([address]) at [phone] that Kendra Valdes has spoken with daughter and order for Hospice has been placed.

## 2020-01-24 NOTE — TELEPHONE ENCOUNTER
Denise from [facility] care states that pt's wounds are getting worse and she states that the she has talked to the daughter is about outting  her  in to [facility] and Denise is requesting a Hospice evaluation. Please advise.  June  [phone]

## 2020-01-25 PROBLEM — R62.7 FAILURE TO THRIVE IN ADULT: Status: ACTIVE | Noted: 2020-01-01

## 2020-01-25 PROBLEM — F03.90 DEMENTIA (HCC): Status: ACTIVE | Noted: 2020-01-01

## 2020-01-25 PROBLEM — J18.9 COMMUNITY ACQUIRED PNEUMONIA: Status: ACTIVE | Noted: 2020-01-01

## 2020-01-25 PROBLEM — E87.0 HYPERNATREMIA: Status: ACTIVE | Noted: 2020-01-01

## 2020-01-25 PROBLEM — E46 CALORIC MALNUTRITION (HCC): Status: ACTIVE | Noted: 2020-01-01

## 2020-01-25 PROBLEM — N17.9 ACUTE RENAL FAILURE (ARF) (HCC): Status: ACTIVE | Noted: 2020-01-01

## 2020-01-25 PROBLEM — E86.0 SEVERE DEHYDRATION: Status: ACTIVE | Noted: 2020-01-01

## 2020-01-25 PROBLEM — D72.829 LEUKOCYTOSIS: Status: ACTIVE | Noted: 2020-01-01

## 2020-01-25 NOTE — ED PROVIDER NOTES
EMERGENCY DEPARTMENT HISTORY AND PHYSICAL EXAM 
 
5:25 PM 
 
 
Date: 1/25/2020 Patient Name: Jim Espinoza History of Presenting Illness Chief Complaint Patient presents with  Altered mental status History Provided By: Patient's Daughter Additional History (Context): Jim Espinoza is a 80 y.o. female with Past medical history of diabetes, dementia, GERD, glaucoma, NSTEMI, breast cancer who presents with chief complaint by daughter of altered mental status. States the patient has not been eating or drinking much at all for the past several days. Daughter reports that the patient is failing to thrive. She also reports that she has decreased urination. Daughter reports that patient has dementia and lives at home with her. She is concerned about patient's lack of nourishment and brought her to the emergency department for further evaluation. Daughter denies any fever, vomiting, diarrhea or constipation. HPI otherwise is limited due to patient's dementia. PCP: DIAN Shaffer Past History Past Medical History: 
Past Medical History:  
Diagnosis Date  ACS (acute coronary syndrome) ([facility] [address] NSTEMI  Arthritis  Breast cancer [facility]) 2007  
 right breast (radiation)  Dementia ([facility] [address]  Diabetes ([facility] [address] diet controlled  GERD (gastroesophageal reflux disease)  Glaucoma  Hypertension Past Surgical History: 
Past Surgical History:  
Procedure Laterality Date  HX APPENDECTOMY  HX BREAST BIOPSY  HX CHOLECYSTECTOMY Family History: 
Family History Problem Relation Age of Onset  Cancer Mother  Cancer Father  Diabetes Sister  Diabetes Paternal Grandmother  Cancer Other  Breast Cancer Daughter Social History: 
Social History Tobacco Use  Smoking status: Never Smoker  Smokeless tobacco: Never Used Substance Use Topics  Alcohol use: No  
 Drug use: No  
 
 
Allergies: Allergies Allergen Reactions  Pcn [Penicillins] Other (comments) Unknown-happened when she was very young Review of Systems Review of Systems Unable to perform ROS: Dementia Constitutional: Positive for fatigue. Very poor eating and drinking per daughter Physical Exam  
 
Visit Vitals /61 (BP 1 Location: Left arm, BP Patient Position: At rest) Pulse 65 Temp 97.4 °F (36.3 °C) Resp 16 Wt 56.7 kg (125 lb) SpO2 100% BMI 19.58 kg/m² Physical Exam 
Vitals signs and nursing note reviewed. Constitutional:   
   General: She is not in acute distress. Appearance: She is ill-appearing. She is not toxic-appearing or diaphoretic. Comments: Awake Clinically dehydrated HENT:  
   Head: Normocephalic and atraumatic. Nose: Nose normal.  
   Mouth/Throat:  
   Mouth: Mucous membranes are dry. Comments: Very dry mucous membranes Eyes:  
   General: No scleral icterus. Conjunctiva/sclera: Conjunctivae normal.  
   Pupils: Pupils are equal, round, and reactive to light. Neck: Musculoskeletal: Neck supple. No neck rigidity. Cardiovascular:  
   Rate and Rhythm: Normal rate. Heart sounds: Normal heart sounds. Comments: Capillary refill < 3 seconds Pulmonary:  
   Effort: Pulmonary effort is normal. No respiratory distress. Breath sounds: No wheezing. Comments: Diminished lung sounds with poor effort Abdominal:  
   General: Bowel sounds are normal. There is no distension. Palpations: Abdomen is soft. Comments: Patient with dementia, but appears to be grimacing from palpation during my abdominal exam.  Has some generalized tenderness. No hernia noted or palpable pulsatile mass Musculoskeletal: Normal range of motion. General: No swelling or tenderness. Right lower leg: No edema. Left lower leg: No edema. Lymphadenopathy:  
   Cervical: No cervical adenopathy.   
Skin: 
 General: Skin is warm and dry. Coloration: Skin is not jaundiced. Findings: No erythema or rash. Comments: Very poor skin turgor Neurological:  
   Comments: Both eyes open, patient is awake Not talking, dementia Pupils 4 mm's and reactive Equal strength upper and lower extremities although with generalized weakness Bilateral knees flexed No hyperreflexia or myoclonus Psychiatric:  
   Comments: Dementia Diagnostic Study Results Labs - Recent Results (from the past 12 hour(s)) CBC WITH AUTOMATED DIFF Collection Time: 01/25/20  6:30 PM  
Result Value Ref Range WBC 29.5 (H) 4.6 - 13.2 K/uL  
 RBC 3.54 (L) 4.20 - 5.30 M/uL  
 HGB 10.8 (L) 12.0 - 16.0 g/dL HCT 30.4 (L) 35.0 - 45.0 % MCV 85.9 74.0 - 97.0 FL  
 MCH 30.5 24.0 - 34.0 PG  
 MCHC 35.5 31.0 - 37.0 g/dL  
 RDW 13.9 11.6 - 14.5 % PLATELET 062 294 - 465 K/uL MPV 11.3 9.2 - 11.8 FL  
 NEUTROPHILS 96 (H) 42 - 75 % LYMPHOCYTES 3 (L) 20 - 51 % MONOCYTES 1 (L) 2 - 9 % EOSINOPHILS 0 0 - 5 % BASOPHILS 0 0 - 3 %  
 ABS. NEUTROPHILS 28.3 (H) 1.8 - 8.0 K/UL  
 ABS. LYMPHOCYTES 0.9 0.8 - 3.5 K/UL  
 ABS. MONOCYTES 0.3 0 - 1.0 K/UL  
 ABS. EOSINOPHILS 0.0 0.0 - 0.4 K/UL  
 ABS. BASOPHILS 0.0 0.0 - 0.06 K/UL  
 DF MANUAL PLATELET COMMENTS ADEQUATE PLATELETS    
 RBC COMMENTS NORMOCYTIC, NORMOCHROMIC METABOLIC PANEL, COMPREHENSIVE Collection Time: 01/25/20  6:30 PM  
Result Value Ref Range Sodium 154 (H) 136 - 145 mmol/L Potassium 4.5 3.5 - 5.5 mmol/L Chloride 120 (H) 100 - 111 mmol/L  
 CO2 25 21 - 32 mmol/L Anion gap 9 3.0 - 18 mmol/L Glucose 167 (H) 74 - 99 mg/dL  (H) 7.0 - 18 MG/DL Creatinine 3.70 (H) 0.6 - 1.3 MG/DL  
 BUN/Creatinine ratio 35 (H) 12 - 20 GFR est AA 14 (L) >60 ml/min/1.73m2 GFR est non-AA 12 (L) >60 ml/min/1.73m2 Calcium 9.6 8.5 - 10.1 MG/DL  Bilirubin, total 0.9 0.2 - 1.0 MG/DL  
 ALT (SGPT) 16 13 - 56 U/L  
 AST (SGOT) 31 10 - 38 U/L Alk. phosphatase 111 45 - 117 U/L Protein, total 7.1 6.4 - 8.2 g/dL Albumin 1.8 (L) 3.4 - 5.0 g/dL Globulin 5.3 (H) 2.0 - 4.0 g/dL A-G Ratio 0.3 (L) 0.8 - 1.7 MAGNESIUM Collection Time: 01/25/20  6:30 PM  
Result Value Ref Range Magnesium 2.8 (H) 1.6 - 2.6 mg/dL Radiologic Studies -  
CT HEAD WO CONT Final Result IMPRESSION:  
  
1. No evidence of acute intracranial process 2. Atrophy and chronic small vessel ischemic disease CT ABD PELV WO CONT Final Result IMPRESSION:  
  
1. Extensive subcutaneous emphysema within the gluteal regions bilaterally,  
much more extensive on the right than the left extending craniocaudally over a  
distance of at least 20 cm. Findings are concerning for necrotizing infection. Discussed with Dr. Ailyn Quiles 2. 1.4 cm nodule within the right lung base. As clinically indicated consider  
dedicated CT chest for complete evaluation. 3.  10 mm nonobstructing left renal calculus. 4.  Other incidental findings as above. XR CHEST PORT Final Result Impression: 1. New alveolar infiltrate in the mid lateral lung field. Follow-up to  
resolution is recommended. Medical Decision Making I am the first provider for this patient. I reviewed the vital signs, available nursing notes, past medical history, past surgical history, family history and social history. Vital Signs-Reviewed the patient's vital signs. Pulse Oximetry Analysis -  100 on room air (Interpretation) normal 
 
Cardiac Monitor: 
Rate: 65 Rhythm:  Normal Sinus Rhythm Records Reviewed: Nursing Notes and Old Medical Records (Time of Review: 6:24 PM) Provider Notes (Medical Decision Making): DDX: Dehydration, infectious, metabolic, malnutrition, neoplasm, constipation, brain mass, stroke, dementia Patient is clinically dehydrated and appears malnourished CT brain, CT abdomen pelvis, labs, urine, IV fluid bolus MDM Medications  
cefTRIAXone (ROCEPHIN) 1 g in sterile water (preservative free) 10 mL IV syringe (has no administration in time range) azithromycin (ZITHROMAX) 500 mg in  mL (has no administration in time range)  
sodium chloride 0.9 % bolus infusion 1,000 mL (1,000 mL IntraVENous New Bag 1/25/20 2025) 0.45% sodium chloride infusion (has no administration in time range)  
vancomycin (VANCOCIN) 1250 mg in  ml infusion (has no administration in time range) VANCOMYCIN INFORMATION NOTE (has no administration in time range)  
sodium chloride 0.9 % bolus infusion 1,000 mL (1,000 mL IntraVENous New Bag 1/25/20 1909) ED Course: Progress Notes, Reevaluation, and Consults: WBC 29 Patient has no sirs criteria met BUN is 128, creatinine 3.7 Emergent IV fluids have been given Sodium 154, chloride 120 CO2 is 25 Albumin only 1.8 New pneumonia on chest x-ray Has not been admitted to the hospital recently, likely community-acquired pneumonia Gave 2 L IV fluid bolus normal saline. Then half-normal saline maintenance fluid. Also gave Rocephin and Zithromax IV antibiotics I have discussed diagnosis, results and plan with daughter who agrees with admission. Consult:  Discussed care with Dr. Anjum Dunaway, Specialty: Hospitalist, standard discussion; including history of patients chief complaint, available diagnostic results, and treatment course. She accepts admission to telemetry 7:30 PM, 1/25/2020 Critical care time:  
I have spent 43 minutes of critical care time involved in lab review, consultations with specialist, family decision making, and documentation. During this entire length of time I was immediately available to the patient. Critical care:  The reason for providing this level of medical care for this critically ill patient was due to a critical illness that impaired one or more vital organ systems such that there was a high probability of imminent or life threatening deterioration in the patients condition. This care involved high complexity decision making to assess, manipulate and support vital system functions, to treat this degree vital organ system failure and to prevent further life threatening deterioration of the patient's condition. Consult:  Discussed care with Dr. Galdino Pruitt, Specialty: Radiologist, standard discussion; including history of patients chief complaint, available diagnostic results, and treatment course. She called me with CT report, that patient with subcutaneous emphysema bilateral gluteal regions concerning for necrotizing fasciitis 8:17 PM, 1/25/2020 Consult:  Discussed care with Dr. Payton Dandy, Specialty: General surgeon, standard discussion; including history of patients chief complaint, available diagnostic results, and treatment course. wants to know if family is wanting surgery for the patient. Requests call back. 8:27 PM, 1/25/2020 Dr. Mel Chen and I had long discussion with daughter regarding patient's condition. Discussed with her that this is serious and that she can die from this serious necrotizing infection. She fully understood and states that she does not want her to have any surgery, but does want admission and antibiotics and other care. Daughter wants to talk with brother regarding [address] Dr. Mel Chen will follow-up with this. I called Dr. Payton Dandy back and gave him the report of no surgery wanted. He states that there is no need for him to consult as hospitalist can continue with antibiotics. I discussed with Dr. Mel Chen that Dr. Payton Dandy would not consult in light of daughter's wishes for no surgery. He states that the hospitalist team can continue with antibiotics. She agrees Diagnosis Clinical Impression: 1. Community acquired pneumonia of right middle lobe of lung ([facility] [address] 2. Acute renal failure, unspecified acute renal failure type ([facility] [address] 3. Leukocytosis, unspecified type 4. Failure to thrive in adult 5. Severe dehydration 6. Caloric malnutrition ([facility] [address] 7. Necrotizing fasciitis of multiple sites [facility]) Disposition: Admitted Follow-up Information Yohana Colon Or,  
 
Dragon medical dictation software was used for portions of this report. Unintended transcription errors may occur. My signature above authenticates this document and my orders, the final   
diagnosis (es), discharge prescription (s), and instructions in the Epic   
record.

## 2020-01-25 NOTE — ED TRIAGE NOTES
Pt arrived by EMS for failure to thrive and change in mental status. Per EMS pts family reports pt has decreased Po intake and not acting herself for a few days.

## 2020-01-26 NOTE — PROGRESS NOTES
[facility] Hospitalist Group Progress Note Patient: Jim Espinoza Age: 80 y.o. : 2/3/1933 MR#: [id number] SSN: [id number] Date: 2020 Subjective/24-hour events:  
 
Remains NAD. Unable to provide any information due to current clinical condition. Assessment:  
Multiple pressure ulcers POA with suspected necrotizing soft tissue infection secondary to right hip wound Hypernatremia is likely secondary to dehydration/volume depletion Acute kidney injury Possible aspiration pneumonia Unintentional weight loss Suspected severe protein calorie malnutrition Functional quadriplegia/bedbound at baseline Dementia Advanced age Plan: 
Empiric antibiotic therapy as ordered, follow cultures. Wound care as best as able - order for IP wound care eval placed. Daughter does not want any evaluation for possible surgical intervention for wounds. Change to hypotonic fluids and monitor sodium. Palliative medicine evaluation for goals of care discussion. Supportive care o/w. Follow. Case discussed with:  []Patient  []Family  []Nursing  []Case Management DVT Prophylaxis:  []Lovenox  []Hep SQ  [x]SCDs  []Coumadin   []On Heparin gtt Objective:  
VS:  
Visit Vitals /88 Pulse 93 Temp 97.4 °F (36.3 °C) Resp (!) 35 Wt 56.7 kg (125 lb) SpO2 97% BMI 19.58 kg/m² Tmax/24hrs: Temp (24hrs), Av.4 °F (36.3 °C), Min:97.4 °F (36.3 °C), Max:97.4 °F (36.3 °C) Intake/Output Summary (Last 24 hours) at 2020 1153 Last data filed at 2020 0502 Gross per 24 hour Intake 1000 ml Output 250 ml Net 750 ml General:  Appears comfortable. Cardiovascular:  RRR Pulmonary:  No wheezes, effort nonlabored. GI:  Abdomen soft. Extremities:  Thin, no edema or ischemia. Neuro:  Nonverbal. 
 
Labs:   
Recent Results (from the past 24 hour(s)) CBC WITH AUTOMATED DIFF Collection Time: 20  6:30 PM  
Result Value Ref Range WBC 29.5 (H) 4.6 - 13.2 K/uL  
 RBC 3.54 (L) 4.20 - 5.30 M/uL  
 HGB 10.8 (L) 12.0 - 16.0 g/dL HCT 30.4 (L) 35.0 - 45.0 % MCV 85.9 74.0 - 97.0 FL  
 MCH 30.5 24.0 - 34.0 PG  
 MCHC 35.5 31.0 - 37.0 g/dL  
 RDW 13.9 11.6 - 14.5 % PLATELET 231 169 - 781 K/uL MPV 11.3 9.2 - 11.8 FL  
 NEUTROPHILS 96 (H) 42 - 75 % LYMPHOCYTES 3 (L) 20 - 51 % MONOCYTES 1 (L) 2 - 9 % EOSINOPHILS 0 0 - 5 % BASOPHILS 0 0 - 3 %  
 ABS. NEUTROPHILS 28.3 (H) 1.8 - 8.0 K/UL  
 ABS. LYMPHOCYTES 0.9 0.8 - 3.5 K/UL  
 ABS. MONOCYTES 0.3 0 - 1.0 K/UL  
 ABS. EOSINOPHILS 0.0 0.0 - 0.4 K/UL  
 ABS. BASOPHILS 0.0 0.0 - 0.06 K/UL  
 DF MANUAL PLATELET COMMENTS ADEQUATE PLATELETS    
 RBC COMMENTS NORMOCYTIC, NORMOCHROMIC METABOLIC PANEL, COMPREHENSIVE Collection Time: 01/25/20  6:30 PM  
Result Value Ref Range Sodium 154 (H) 136 - 145 mmol/L Potassium 4.5 3.5 - 5.5 mmol/L Chloride 120 (H) 100 - 111 mmol/L  
 CO2 25 21 - 32 mmol/L Anion gap 9 3.0 - 18 mmol/L Glucose 167 (H) 74 - 99 mg/dL  (H) 7.0 - 18 MG/DL Creatinine 3.70 (H) 0.6 - 1.3 MG/DL  
 BUN/Creatinine ratio 35 (H) 12 - 20 GFR est AA 14 (L) >60 ml/min/1.73m2 GFR est non-AA 12 (L) >60 ml/min/1.73m2 Calcium 9.6 8.5 - 10.1 MG/DL Bilirubin, total 0.9 0.2 - 1.0 MG/DL  
 ALT (SGPT) 16 13 - 56 U/L  
 AST (SGOT) 31 10 - 38 U/L Alk. phosphatase 111 45 - 117 U/L Protein, total 7.1 6.4 - 8.2 g/dL Albumin 1.8 (L) 3.4 - 5.0 g/dL Globulin 5.3 (H) 2.0 - 4.0 g/dL A-G Ratio 0.3 (L) 0.8 - 1.7 MAGNESIUM Collection Time: 01/25/20  6:30 PM  
Result Value Ref Range Magnesium 2.8 (H) 1.6 - 2.6 mg/dL CULTURE, BLOOD Collection Time: 01/25/20  8:00 PM  
Result Value Ref Range Special Requests: NO SPECIAL REQUESTS Culture result: NO GROWTH AFTER 9 HOURS    
CULTURE, BLOOD Collection Time: 01/25/20  8:20 PM  
Result Value Ref Range Special Requests: NO SPECIAL REQUESTS Culture result: NO GROWTH AFTER 9 HOURS    
URINALYSIS W/ RFLX MICROSCOPIC Collection Time: 01/25/20  9:00 PM  
Result Value Ref Range Color DARK YELLOW Appearance TURBID Specific gravity 1.021 1.005 - 1.030    
 pH (UA) 5.0 5.0 - 8.0 Protein 30 (A) NEG mg/dL Glucose NEGATIVE  NEG mg/dL Ketone NEGATIVE  NEG mg/dL Bilirubin SMALL (A) NEG Blood MODERATE (A) NEG Urobilinogen 1.0 0.2 - 1.0 EU/dL Nitrites NEGATIVE  NEG Leukocyte Esterase TRACE (A) NEG    
CULTURE, WOUND W GRAM STAIN Collection Time: 01/25/20  9:00 PM  
Result Value Ref Range Special Requests: LF HIP WOUND   
 GRAM STAIN MODERATE WBC'S    
 GRAM STAIN MANY GRAM POSITIVE COCCI IN PAIRS    
 GRAM STAIN MODERATE GRAM NEGATIVE RODS Culture result: FEW GRAM NEGATIVE RODS (A) Culture result: FEW 2ND GRAM NEGATIVE LEO (A) Culture result: CULTURE IN PROGRESS,FURTHER UPDATES TO FOLLOW URINE MICROSCOPIC ONLY Collection Time: 01/25/20  9:00 PM  
Result Value Ref Range WBC 1 to 3 0 - 4 /hpf  
 RBC 0 to 1 0 - 5 /hpf Epithelial cells 1+ 0 - 5 /lpf Bacteria 1+ (A) NEG /hpf Amorphous Crystals 3+ (A) NEG  
CULTURE, WOUND W GRAM STAIN Collection Time: 01/25/20  9:00 PM  
Result Value Ref Range Special Requests: RT HIP WOUND   
 GRAM STAIN FEW WBC'S    
 GRAM STAIN FEW GRAM POSITIVE COCCI IN PAIRS    
 GRAM STAIN MODERATE GRAM NEGATIVE RODS Culture result: MODERATE GRAM NEGATIVE RODS (A) Culture result: CULTURE IN PROGRESS,FURTHER UPDATES TO FOLLOW POC LACTIC ACID Collection Time: 01/26/20 12:22 AM  
Result Value Ref Range Lactic Acid (POC) 1.26 0.40 - 2.00 mmol/L METABOLIC PANEL, BASIC Collection Time: 01/26/20  4:31 AM  
Result Value Ref Range Sodium 155 (H) 136 - 145 mmol/L Potassium 3.6 3.5 - 5.5 mmol/L Chloride 123 (H) 100 - 111 mmol/L  
 CO2 21 21 - 32 mmol/L Anion gap 11 3.0 - 18 mmol/L Glucose 113 (H) 74 - 99 mg/dL  (H) 7.0 - 18 MG/DL Creatinine 3.13 (H) 0.6 - 1.3 MG/DL  
 BUN/Creatinine ratio 37 (H) 12 - 20 GFR est AA 17 (L) >60 ml/min/1.73m2 GFR est non-AA 14 (L) >60 ml/min/1.73m2 Calcium 8.3 (L) 8.5 - 10.1 MG/DL  
CBC WITH AUTOMATED DIFF Collection Time: 01/26/20  4:31 AM  
Result Value Ref Range WBC 29.4 (H) 4.6 - 13.2 K/uL  
 RBC 3.06 (L) 4.20 - 5.30 M/uL HGB 9.1 (L) 12.0 - 16.0 g/dL HCT 26.0 (L) 35.0 - 45.0 % MCV 85.0 74.0 - 97.0 FL  
 MCH 29.7 24.0 - 34.0 PG  
 MCHC 35.0 31.0 - 37.0 g/dL  
 RDW 14.1 11.6 - 14.5 % PLATELET 614 (L) 522 - 420 K/uL MPV 11.2 9.2 - 11.8 FL  
 NEUTROPHILS 85 (H) 42 - 75 % BAND NEUTROPHILS 11 (H) 0 - 5 % LYMPHOCYTES 1 (L) 20 - 51 % MONOCYTES 1 (L) 2 - 9 % EOSINOPHILS 0 0 - 5 % BASOPHILS 0 0 - 3 % METAMYELOCYTES 2 (H) 0 %  
 ABS. NEUTROPHILS 28.2 (H) 1.8 - 8.0 K/UL  
 ABS. LYMPHOCYTES 0.3 (L) 0.8 - 3.5 K/UL  
 ABS. MONOCYTES 0.3 0 - 1.0 K/UL  
 ABS. EOSINOPHILS 0.0 0.0 - 0.4 K/UL  
 ABS. BASOPHILS 0.0 0.0 - 0.06 K/UL  
 DF MANUAL PLATELET COMMENTS DECREASED PLATELETS    
 RBC COMMENTS POLYCHROMASIA 1+ 
    
 RBC COMMENTS POIKILOCYTOSIS 1+ 
    
 RBC COMMENTS TARGET CELLS 2+ 
    
 RBC COMMENTS SCHISTOCYTES 1+ PHOSPHORUS Collection Time: 01/26/20  4:31 AM  
Result Value Ref Range Phosphorus 3.7 2.5 - 4.9 MG/DL Signed By: Kayy Mendez MD   
 January 26, 2020

## 2020-01-26 NOTE — H&P
History & Physical 
 
Patient: Alcides Rutherford MRN: [id number]  [facility]: [id number] YOB: 1933  Age: 80 y.o. Sex: female DOA: 1/25/2020 Chief Complaint Patient presents with  Altered mental status HPI:  
 
Alcides Rutherford is a 80 y.o. female with extensive past medical history including coronary artery disease with history of an STEMI, history of right breast cancer status post radiation, diet-controlled diabetes, GERD, glaucoma, hypertension, and dementia who presented to the ED brought in by her daughter for decreased p.o. intake over the past several days. The daughter noted that she has had decreased p.o. intake for both fluids and food since Wednesday. Daughter states that she has been squeezing water into her mom's mouth with a sponge. She called her primary care physician and was directed to the emergency room. In the ED, she was noted to have leukocytosis with left shift but she has been afebrile, no tachycardia, and blood pressure has been within normal range. Her sodium is noted to be 154 and her creatinine 3.7 with a baseline of around 0.89. Her BUN elevated at 128. She is noted to have a right middle lobe infiltrate, daughter is uncertain if she is aspirating. In the ED she received azithromycin, vancomycin, and ceftriaxone, 2 L of normal saline, and was subsequently started on 1/2 NS. Straight cath done in ED around 8 pm yielded no urine after the 2 L boluses. Daughter at the bedside provides all of the history as the patient is nonverbal. Patient is noted to have several pressure ulcers, which are chronic and the patient's daughter states that wound care nurses come in 3 times weekly to assist with dressing change and monitoring the wounds. Daughter also states that she has an aide that comes in several times throughout the day, 7 days a week.   The aide is actually a neighbor and for this reason is able to help her intermittently throughout the day. In the ED, CT of her chest and abdomen revealed extensive subcutaneous emphysema within the gluteal regions bilaterally, much more extensive on the right than the left and extending craniocaudally over a distance of at least 20 cm. Findings concerning for necrotizing infection. The daughter is at the bedside and is made aware of these findings she states that she does not want to pursue surgery at this time, she expresses understanding that the patient is a poor surgical candidate, but she wants to consider and continue other treatments for now with regard to [address] she wants to keep her mom a full code for now. Patient's son resides in [place] and daughter needs to go home and call him as she does not have the phone number with her. Daughter also expresses that home health care nurses had suggested hospice to her in the past and she was considering this as an option. She declines palliative care at this time however, until she discusses with her brother. With regard to her baseline mental status, at times patient knows her name. She has been bedbound for 3 years, which the daughter attributes to arthritis and dementia. Daughter has only noted facial grimacing when turning the patient, no other indications of pain. Daughter Tiff Wilson [phone] Past Medical History:  
Diagnosis Date  ACS (acute coronary syndrome) ([address] NSTEMI  Arthritis  Breast cancer [facility]) 2007  
 right breast (radiation)  Dementia ([address]  Diabetes ([facility] diet controlled  GERD (gastroesophageal reflux disease)  Glaucoma  Hypertension Past Surgical History:  
Procedure Laterality Date  HX APPENDECTOMY  HX BREAST BIOPSY  HX CHOLECYSTECTOMY Family History Problem Relation Age of Onset  Cancer Mother  Cancer Father  Diabetes Sister  Diabetes Paternal Grandmother  Cancer Other  Breast Cancer Daughter Social History Socioeconomic History  Marital status:  Spouse name: Not on file  Number of children: Not on file  Years of education: Not on file  Highest education level: Not on file Tobacco Use  Smoking status: Never Smoker  Smokeless tobacco: Never Used Substance and Sexual Activity  Alcohol use: No  
 Drug use: No  
 Sexual activity: Never Prior to Admission medications Medication Sig Start Date End Date Taking? Authorizing Provider  
clindamycin (CLEOCIN) 300 mg capsule Take 1 Cap by mouth three (3) times daily for 10 days. 1/21/20 1/31/20  Sia Rodarte PA  
memantine (NAMENDA) 10 mg tablet Take 1 Tab by mouth daily. 1 tab every am 12/31/19   Susan Crandall NP  
MULTIVITAMIN PO Take 1 Tab by mouth daily. Provider, Historical  
ferrous sulfate 324 mg (65 mg iron) tablet Take 324 mg by mouth daily. 1 tab before breakfast    Provider, Historical  
atorvastatin (LIPITOR) 80 mg tablet TAKE 1 TABLET EVERY DAY AT BEDTIME 12/19/19   Win Barron NP  
lisinopril (PRINIVIL, ZESTRIL) 5 mg tablet TAKE 1 TABLET EVERY DAY 12/17/19   Palomo Bolaños MD  
raNITIdine (ZANTAC) 150 mg tablet Take 1 Tab by mouth two (2) times a day. 12/17/19   Palomo Bolaños MD  
netarsudil mesylate (RHOPRESSA OP) Administer 1 Drop to both eyes daily. Provider, Historical  
hydrocortisone (ALA-DEEDEE) 1 % lotion Apply  to affected area two (2) times a day. use thin layer 4/5/18   Marcelo Jacobson MD  
CALCIUM 600 600 mg calcium (1,500 mg) tablet take 1 tablet by mouth twice a day Patient taking differently: Pt has 600 mg tabs take 1 tablet by mouth twice a day 2/15/18   Marcelo Jacobson MD  
acetaminophen (TYLENOL EXTRA STRENGTH) 500 mg tablet Take 500 mg by mouth every six (6) hours as needed for Fever or Pain.  1 tab    Provider, Historical  
dorzolamide (TRUSOPT) 2 % ophthalmic solution Administer 2 Drops to both eyes three (3) times daily. Provider, Historical  
aspirin delayed-release 81 mg tablet take 1 tablet by mouth once daily 17   Jerald Lima MD  
latanoprost (XALATAN) 0.005 % ophthalmic solution Administer 1 Drop to both eyes nightly. Provider, Historical  
peg 400-propylene glycol (SYSTANE) 0.4-0.3 % drop Administer 1 Drop to left eye as needed for Other (PRN Dry eyes). PRN Dry eyes    Provider, Historical  
 
 
Allergies Allergen Reactions  Pcn [Penicillins] Other (comments) Unknown-happened when she was very young Review of Systems limited due to patient unable to respond. GENERAL: No fevers or chills noted by daughter. PULMONARY: No shortness of breath, cough or wheeze. GASTROINTESTINAL: No vomiting or diarrhea, melena or bright red blood per rectum. GENITOURINARY: No foul odor to urine. DERMATOLOGIC: No rash. +several pressure ulcers poa. HEMATOLOGICAL: No easy bruising or bleeding. NEUROLOGIC: No seizures Physical Exam:  
 
Physical Exam: 
Visit Vitals /61 (BP 1 Location: Left arm, BP Patient Position: At rest) Pulse 65 Temp 97.4 °F (36.3 °C) Resp 16 Wt 56.7 kg (125 lb) SpO2 100% BMI 19.58 kg/m² O2 Device: Room air Temp (24hrs), Av.4 °F (36.3 °C), Min:97.4 °F (36.3 °C), Max:97.4 °F (36.3 °C) No intake/output data recorded. No intake/output data recorded. Thin, chronically ill appearing, diffuse muscle wasting. Does not open eyes to voice. Not answering questions, not following commands. Facial grimacing during turning for exam.  
Bitemporal wasting. PERRL. Poor dentition. Dry mucous membranes. RRR 
cta b.l Abd soft nt nd nabs No edema. dp 1+ b.l. early contractures to b.l LE. Not answering questions, not following commands. Right hip stage 4 pressure ulcer poa, foul smelling with surrounding subcut fluctuance. Left  hip stage 4 pressure ulcer poa, foul smelling with surrounding subcut fluctuance. Labs Reviewed: 
 
Recent Results (from the past 24 hour(s)) CBC WITH AUTOMATED DIFF Collection Time: 01/25/20  6:30 PM  
Result Value Ref Range WBC 29.5 (H) 4.6 - 13.2 K/uL  
 RBC 3.54 (L) 4.20 - 5.30 M/uL  
 HGB 10.8 (L) 12.0 - 16.0 g/dL HCT 30.4 (L) 35.0 - 45.0 % MCV 85.9 74.0 - 97.0 FL  
 MCH 30.5 24.0 - 34.0 PG  
 MCHC 35.5 31.0 - 37.0 g/dL  
 RDW 13.9 11.6 - 14.5 % PLATELET 891 178 - 097 K/uL MPV 11.3 9.2 - 11.8 FL  
 NEUTROPHILS 96 (H) 42 - 75 % LYMPHOCYTES 3 (L) 20 - 51 % MONOCYTES 1 (L) 2 - 9 % EOSINOPHILS 0 0 - 5 % BASOPHILS 0 0 - 3 %  
 ABS. NEUTROPHILS 28.3 (H) 1.8 - 8.0 K/UL  
 ABS. LYMPHOCYTES 0.9 0.8 - 3.5 K/UL  
 ABS. MONOCYTES 0.3 0 - 1.0 K/UL  
 ABS. EOSINOPHILS 0.0 0.0 - 0.4 K/UL  
 ABS. BASOPHILS 0.0 0.0 - 0.06 K/UL  
 DF MANUAL PLATELET COMMENTS ADEQUATE PLATELETS    
 RBC COMMENTS NORMOCYTIC, NORMOCHROMIC METABOLIC PANEL, COMPREHENSIVE Collection Time: 01/25/20  6:30 PM  
Result Value Ref Range Sodium 154 (H) 136 - 145 mmol/L Potassium 4.5 3.5 - 5.5 mmol/L Chloride 120 (H) 100 - 111 mmol/L  
 CO2 25 21 - 32 mmol/L Anion gap 9 3.0 - 18 mmol/L Glucose 167 (H) 74 - 99 mg/dL  (H) 7.0 - 18 MG/DL Creatinine 3.70 (H) 0.6 - 1.3 MG/DL  
 BUN/Creatinine ratio 35 (H) 12 - 20 GFR est AA 14 (L) >60 ml/min/1.73m2 GFR est non-AA 12 (L) >60 ml/min/1.73m2 Calcium 9.6 8.5 - 10.1 MG/DL Bilirubin, total 0.9 0.2 - 1.0 MG/DL  
 ALT (SGPT) 16 13 - 56 U/L  
 AST (SGOT) 31 10 - 38 U/L Alk. phosphatase 111 45 - 117 U/L Protein, total 7.1 6.4 - 8.2 g/dL Albumin 1.8 (L) 3.4 - 5.0 g/dL Globulin 5.3 (H) 2.0 - 4.0 g/dL A-G Ratio 0.3 (L) 0.8 - 1.7 MAGNESIUM Collection Time: 01/25/20  6:30 PM  
Result Value Ref Range Magnesium 2.8 (H) 1.6 - 2.6 mg/dL Results Procedure Component Value Units Date/Time Luanneen Fuchs STAIN [[id number]] Collected:  01/25/20 0648 Order Status:  Completed Specimen:  Wound from Drainage Updated:  01/25/20 2146 Serene Oyster STAIN [[id number]] Collected:  01/25/20 2100 Order Status:  Completed Updated:  01/25/20 2146 CULTURE, BLOOD [[id number]] Collected:  01/25/20 2020 Order Status:  Completed Specimen:  Blood Updated:  01/25/20 2055 CULTURE, BLOOD [[id number]] Collected:  01/25/20 2000 Order Status:  Completed Specimen:  Blood Updated:  01/25/20 2055 CULTURE, [address] [[id number]] Order Status:  Canceled Specimen:  Wound CULTURE, Doyce Splinter STAIN [[id number]]  (Abnormal)  (Susceptibility) Collected:  01/21/20 1501 Order Status:  Completed Specimen:  Wound from Hip, left Updated:  01/25/20 4775 Special Requests: NO SPECIAL REQUESTS     
  GRAM STAIN FEW WBC'S     
      
  MANY GRAM POSITIVE COCCI IN PAIRS  
     
      
  FEW GRAM POSITIVE COCCI IN GROUPS MODERATE GRAM POSITIVE RODS  
     
   MANY GRAM NEGATIVE RODS Culture result: MODERATE PROVIDENCIA STUARTII  
     
   MODERATE ESCHERICHIA COLI MODERATE ENTEROCOCCUS FAECALIS GROUP D MODERATE STAPHYLOCOCCUS SPECIES, COAGULASE NEGATIVE MODERATE DIPHTHEROIDS Susceptibility Providencia stuartii NEEMA Ampicillin ($) Resistant Ampicillin/sulbactam ($) Intermediate Cefazolin ($) Resistant Cefepime ($$) Susceptible Ceftazidime ($) Susceptible Ceftriaxone ($) Susceptible Ciprofloxacin ($) Susceptible Gentamicin ($) Resistant Imipenem Susceptible Levofloxacin ($) Susceptible Piperacillin/Tazobac ($) Susceptible Tobramycin ($) Resistant Trimeth-Sulfamethoxa Susceptible Susceptibility Escherichia coli NEEMA Ampicillin ($) Susceptible Ampicillin/sulbactam ($) Susceptible Cefazolin ($) Susceptible Cefepime ($$) Susceptible Ceftazidime ($) Susceptible Ceftriaxone ($) Susceptible Ciprofloxacin ($) Susceptible Gentamicin ($) Susceptible Imipenem Susceptible Levofloxacin ($) Susceptible Piperacillin/Tazobac ($) Susceptible Tobramycin ($) Susceptible Trimeth-Sulfamethoxa Susceptible Susceptibility Enterococcus  faecalis group D  
  NEEMA Ampicillin ($) Susceptible Gentamicin Synergy S Susceptible Penicillin G ($$) Susceptible Streptomycin Synergy Resistant Tigecycline ($$$$) Susceptible Vancomycin ($) Susceptible CULTURE, Sara Camel STAIN [[id number]]  (Abnormal)  (Susceptibility) Collected:  01/21/20 1501 Order Status:  Completed Specimen:  Wound from Hip, right Updated:  01/25/20 1958 Special Requests: NO SPECIAL REQUESTS     
  GRAM STAIN NO WBC'S SEEN     
      
  RARE GRAM POSITIVE COCCI IN PAIRS Culture result: FEW ESCHERICHIA COLI     
   FEW PROVIDENCIA STUARTII     
      
  FEW ENTEROCOCCUS FAECALIS GROUP D  
     
   FEW DIPHTHEROIDS Susceptibility Escherichia coli NEEMA Ampicillin ($) Susceptible Ampicillin/sulbactam ($) Susceptible Cefazolin ($) Susceptible Cefepime ($$) Susceptible Ceftazidime ($) Susceptible Ceftriaxone ($) Susceptible Ciprofloxacin ($) Susceptible Gentamicin ($) Susceptible Imipenem Susceptible Levofloxacin ($) Susceptible Piperacillin/Tazobac ($) Susceptible Tobramycin ($) Susceptible Trimeth-Sulfamethoxa Susceptible Susceptibility Providencia stuartii NEEMA Ampicillin ($) Resistant Ampicillin/sulbactam ($) Intermediate Cefazolin ($) Resistant Cefepime ($$) Susceptible Ceftazidime ($) Susceptible Ceftriaxone ($) Susceptible Ciprofloxacin ($) Susceptible Gentamicin ($) Resistant Imipenem Susceptible Levofloxacin ($) Susceptible Piperacillin/Tazobac ($) Susceptible Tobramycin ($) Resistant Trimeth-Sulfamethoxa Susceptible Susceptibility Enterococcus  faecalis group D  
  NEEMA Ampicillin ($) Susceptible Gentamicin Synergy S Susceptible Penicillin G ($$) Susceptible Streptomycin Synergy Resistant Tigecycline ($$$$) Susceptible Vancomycin ($) Susceptible CULTURE, Bibi Conrad STAIN [[id number]]  (Abnormal)  (Susceptibility) Collected:  01/21/20 1501 Order Status:  Completed Specimen:  Wound from Sacrum Updated:  01/25/20 4489 Special Requests: NO SPECIAL REQUESTS     
  GRAM STAIN RARE WBC'S     
      
  RARE GRAM POSITIVE COCCI IN PAIRS  
     
   FEW GRAM NEGATIVE RODS     
   FEW GRAM POSITIVE RODS Culture result: MODERATE ESCHERICHIA COLI     
   FEW PROVIDENCIA STUARTII     
   MODERATE DIPHTHEROIDS FEW STAPHYLOCOCCUS SPECIES, COAGULASE NEGATIVE Susceptibility Escherichia coli NEEMA Ampicillin ($) Susceptible Ampicillin/sulbactam ($) Susceptible Cefazolin ($) Susceptible Cefepime ($$) Susceptible Ceftazidime ($) Susceptible Ceftriaxone ($) Susceptible Ciprofloxacin ($) Susceptible Gentamicin ($) Susceptible Imipenem Susceptible Levofloxacin ($) Susceptible Piperacillin/Tazobac ($) Susceptible Tobramycin ($) Susceptible Trimeth-Sulfamethoxa Susceptible Susceptibility Providencia stuartii NEEMA Ampicillin ($) Resistant Ampicillin/sulbactam ($) Resistant Cefazolin ($) Resistant Cefepime ($$) Susceptible Ceftazidime ($) Susceptible Ceftriaxone ($) Susceptible Ciprofloxacin ($) Susceptible Gentamicin ($) Resistant Imipenem Susceptible Levofloxacin ($) Susceptible Piperacillin/Tazobac ($) Susceptible Tobramycin ($) Resistant Trimeth-Sulfamethoxa Susceptible IMAGING 
CT Results (most recent): 
Results from Hospital Encounter encounter on 01/25/20 CT ABD PELV WO CONT Narrative EXAM: CT ABD PELV WO CONT 
 
CLINICAL INDICATION/HISTORY : ams; abd pain. COMPARISON: None TECHNIQUE: Helical scan of the abdomen and pelvis was obtained  from the 
diaphragm to the symphysis without oral and without uneventful IV contrast 
administration. All CT scans at this facility are performed using dose optimization technique as 
appropriate to a performed exam, to include automated exposure control, 
adjustment of the MA and/or kV according to patient size (including appropriate 
matching for site specific examinations), or use of iterative reconstruction 
technique. FINDINGS:  
Lung Bases: Peripheral subpleural 1.4 cm nodule right lateral costophrenic angle 
with adjacent linear area of probable scarring. Liver: Unremarkable Gallbladder:  Not seen Spleen: Calcified granuloma Adrenals:  Bilateral adrenal calcification Pancreas: Unremarkable Kidneys: Bilateral renal cysts with a partially calcified cyst upper pole right 
kidney which is poorly evaluated due to patient motion. 10 mm calculus mid left 
kidney measures an average of 1170 Hounsfield units. No hydronephrosis Stomach, small bowel, colon:  Unremarkable. Appendix not seen Lymph nodes:  No adenopathy Vasculature/Aorta: There are atherosclerotic vascular calcifications. Peritoneal spaces:  No ascites or free air Bladder:  Bladder is not well distended and not well evaluated. Pelvic structures: There is extensive subcutaneous emphysema within the right 
gluteal region extending up into the lateral pelvis subcutaneous fat. There is a 
large area of involvement approximately 15 cm x 20 cm. The inferior extent may 
not be completely included. There is also an area of subcutaneous emphysema at 
the left gluteal region which is extensive but smaller than on the right and 
measures approximately 9 cm x 8 cm. Bones: Bilateral sacroiliac joint sclerosis. Degenerative changes of the spine Impression IMPRESSION: 
 
1. Extensive subcutaneous emphysema within the gluteal regions bilaterally, 
much more extensive on the right than the left extending craniocaudally over a 
distance of at least 20 cm. Findings are concerning for necrotizing infection. Discussed with Dr. Pietro Cardenas 2. 1.4 cm nodule within the right lung base. As clinically indicated consider 
dedicated CT chest for complete evaluation. 3.  10 mm nonobstructing left renal calculus. 4.  Other incidental findings as above. CT HEAD WO CONT  1/25/2020 COMPARISON: February 18, 2019.  
1.  No evidence of acute intracranial process 2. Atrophy and chronic small vessel ischemic disease XR Results (most recent): 
Results from [facility] [place]counter encounter on 01/25/20 XR CHEST PORT Narrative EXAM: Chest Radiograph INDICATION:  ams TECHNIQUE: AP view of the chest 
 
COMPARISON: 5/18/2019, 2/18/2019, 11/5/2018, 10/30/2017 FINDINGS: No pneumothorax identified. There is a subtle opacity in the right 
midlung field overlying the right posterior fifth and sixth ribs which is new 
since prior imaging. The lungs are hyperinflated. This is unchanged. No 
effusions identified. The cardiomediastinal silhouette is unremarkable. The 
pulmonary vasculature is unremarkable. The osseous structures are unremarkable. Impression Impression: 1. New alveolar infiltrate in the mid lateral lung field. Follow-up to 
resolution is recommended. Procedures/imaging: see electronic medical records for all procedures/Xrays and details which were not copied into this note but were reviewed prior to creation of Plan Assessment/Plan 1. Necrotizing soft tissue infection. Daughter declines surgical evaluation at this time, and expresses understanding patient is poor surgical candidate.  Continue abx, follow wound and blood cx, supportive care.  
2. Leukocytosis poa - sources include soft tissue infection, pneumonia. 3. Acute metabolic encephalopathy superimposed on dementia - treat infection and dehydration 4. Volume depletion, profound - appropriate volume resuscitation provided in ED, continue 1/2 NS. 5. ARF - follow UA, continue fluids. 6. Lactic acidosis 7. Hypernatremia - she received 2 L NS in ED, continue 1/2 NS. 8. Pneumonia, concern for aspiration - continue abx, follow blood cx. 9. Pressure ulcers poa - wound care. Frequent turning. 10. Dementia, baseline mental status - knows her name sometimes. 11. Baseline functional status: bedbound x 3 years. 12. Unintentional weight loss, Body mass index is 19.58 kg/m². npo for now given altered mental status, she is not safe for po intake at this time. 13. Anemia normocytic normochromic 14. dvt prophylaxis with scds 15. Full code. Daughter to discuss code status with her brother who resides in [place]. Saima Canales MD 
January 25, 2020

## 2020-01-26 NOTE — ED NOTES
1st contact with this patient:  Patient identified. Patient noted to have arrived by EMS for failure to thrive  See Triage noted by this writer. 1830:  Provider noted to be at bedside. Port chest xray completed 1909:  Patient educated on the medication(s) he/she is going to receive, allergies reviewed/verified and patient medicated as ordered. Side rails up and call light within reach. Will continue to monitor. 1915:  Report given to [facility] - RESIDENT DRUG TREATMENT (WOMEN) RN, ending this assignment with this writer

## 2020-01-26 NOTE — TELEPHONE ENCOUNTER
Called answering service on 1/25/2020 at 4:12 pm to be patched through to patient's daughter whoc called on patient's behalf stating that patient had had a change in status from her last office visit with her PCP. Lurdes Danielle  ( on BRAD[place]) stated that pt had not eaten or drank anything since 1/24/2020. Pt has known dementia and was apparently more lethargic and ill. Advised that pt bee seen at the ED for eval. Daughter voiced understanding and will take pt to the ED.

## 2020-01-27 NOTE — PROGRESS NOTES
[facility] Hospitalist Group Progress Note Patient: Karlo Mcclendon Age: 80 y.o. : 2/3/1933 MR#: [id number] SSN: [id number] Date: 2020 Subjective/24-hour events:  
 
Remains roomed in ED. Daughter present at bedside. Assessment:  
Multiple pressure ulcers POA with suspected polymicrobial soft tissue infection secondary to right hip wound Hypernatremia is likely secondary to dehydration/volume depletion Acute kidney injury Possible aspiration pneumonia Unintentional weight loss Suspected severe protein calorie malnutrition Functional quadriplegia/bedbound at baseline Dementia Advanced age Plan: WBCs without any improvement. Will continue empiric antibiotics to continue but will broaden coverage. Continue Sodium improving appropriately with hypotonic fluids - continue and monitor. No significant improvement in renal indices as of yet. Monitor. [facility] services eval. 
Long discussion with daughter at bedside re: goals of care. Broached subject of hospice and discussed possible disposition options depending on resources. Daughter has stated again today that she does not wish to pursue any surgical interventions, even if offered. Explained that hospice care would likely be most suitable option if aggressive medical therapies are not desired. Daughter amenable to talking with palliative medicine team and states that her brother would also be involved in any goals of care decisions. Have discussed case briefly with palliative medicine team via phone and will await their evaluation. Assistance is appreciated in advance as always. Will ask nephrology and ID to see tomorrow, especially if no final care decisions made by then. Case discussed with:  []Patient  [x]Family  []Nursing  []Case Management DVT Prophylaxis:  []Lovenox  []Hep SQ  [x]SCDs  []Coumadin   []On Heparin gtt Objective:  
VS:  
Visit Vitals /68 (BP 1 Location: Right arm, BP Patient Position: At rest) Pulse 84 Temp 97.6 °F (36.4 °C) Resp 26 Wt 56.7 kg (125 lb) SpO2 96% BMI 19.58 kg/m² Tmax/24hrs: Temp (24hrs), Av.4 °F (36.3 °C), Min:97.1 °F (36.2 °C), Max:97.6 °F (36.4 °C) Intake/Output Summary (Last 24 hours) at 2020 1858 Last data filed at 2020 1030 Gross per 24 hour Intake  Output 200 ml Net -200 ml General:  Appears comfortable, in no distress. Cardiovascular:  RRR Pulmonary:  No wheezes, effort nonlabored. GI:  Abdomen soft. Extremities:  Thin, no edema or ischemia. Neuro:  Nonverbal. 
 
Labs:   
Recent Results (from the past 24 hour(s)) POC G3 Collection Time: 20  6:18 AM  
Result Value Ref Range Device: ROOM AIR    
 pH (POC) 7.441 7.35 - 7.45    
 pCO2 (POC) 26.0 (L) 35.0 - 45.0 MMHG  
 pO2 (POC) 123 (H) 80 - 100 MMHG  
 HCO3 (POC) 17.7 (L) 22 - 26 MMOL/L  
 sO2 (POC) 99 (H) 92 - 97 % Base deficit (POC) 6 mmol/L Allens test (POC) YES Total resp. rate 32 Site RIGHT RADIAL Specimen type (POC) ARTERIAL Performed by Marijane Nyhan POC LACTIC ACID Collection Time: 20  6:25 AM  
Result Value Ref Range Lactic Acid (POC) 2.02 (HH) 0.40 - 2.00 mmol/L METABOLIC PANEL, BASIC Collection Time: 20  8:15 AM  
Result Value Ref Range Sodium 147 (H) 136 - 145 mmol/L Potassium 3.8 3.5 - 5.5 mmol/L Chloride 117 (H) 100 - 111 mmol/L  
 CO2 20 (L) 21 - 32 mmol/L Anion gap 10 3.0 - 18 mmol/L Glucose 265 (H) 74 - 99 mg/dL  (H) 7.0 - 18 MG/DL Creatinine 3.10 (H) 0.6 - 1.3 MG/DL  
 BUN/Creatinine ratio 39 (H) 12 - 20 GFR est AA 17 (L) >60 ml/min/1.73m2 GFR est non-AA 14 (L) >60 ml/min/1.73m2 Calcium 8.3 (L) 8.5 - 10.1 MG/DL Rudolpho Reagin Collection Time: 20  8:15 AM  
Result Value Ref Range Vancomycin, random 12.1 5.0 - 40.0 UG/ML  
GLUCOSE, POC  Collection Time: 20  5:24 PM  
 Result Value Ref Range Glucose (POC) 169 (H) 70 - 110 mg/dL Signed By: Marian Gonzalez MD   
 January 27, 2020

## 2020-01-27 NOTE — PROGRESS NOTES
Wound Sacrum Lower Stage II Pressure ulcer 12/29/19 (Active) Dressing Status Removed 1/27/2020  9:54 AM  
Dressing Type Gauze 1/27/2020  9:54 AM  
Pressure Injury Unstageable 1/27/2020  9:54 AM  
Wound Length (cm) 6.5 cm 1/27/2020  9:54 AM  
Wound Width (cm) 9 cm 1/27/2020  9:54 AM  
Wound Surface Area (cm^2) 58.5 cm^2 1/27/2020  9:54 AM  
Condition of Base Eschar 1/27/2020  9:54 AM  
Condition of Edges Closed 1/27/2020  9:54 AM  
Tissue Type Percent Black 95 % 1/27/2020  9:54 AM  
Tissue Type Percent Red 5 1/27/2020  9:54 AM  
Drainage Amount Scant 1/27/2020  9:54 AM  
Drainage Color Serosanguinous 1/27/2020  9:54 AM  
Wound Odor Mild 1/27/2020  9:54 AM  
Annette-wound Assessment Black 1/27/2020  9:54 AM  
Margins Defined edges 1/27/2020  9:54 AM  
Dressing Type Applied Gauze 1/27/2020  9:54 AM  
Procedure Tolerated Fair 1/27/2020  9:54 AM  
Number of days: 29 Wound Buttocks Lateral;Left;Mid Unstageable Pressure ulcer 12/29/19 (Active) Dressing Status Removed 1/27/2020  9:54 AM  
Dressing Type Gauze 1/27/2020  9:54 AM  
Pressure Injury Unstageable 1/27/2020  9:54 AM  
Wound Length (cm) 8.5 cm 1/27/2020  9:54 AM  
Wound Width (cm) 9.2 cm 1/27/2020  9:54 AM  
Wound Surface Area (cm^2) 78.2 cm^2 1/27/2020  9:54 AM  
Condition of Base Eschar;Slough;Muscle exposed 1/27/2020  9:54 AM  
Condition of Edges Closed 1/27/2020  9:54 AM  
Tissue Type Percent Black 50 % 1/27/2020  9:54 AM  
Tissue Type Percent Red 50 1/27/2020  9:54 AM  
Undermining (cm) 5 cm 1/27/2020  9:54 AM  
Direction of Undermining 12 o'clock 1/27/2020  9:54 AM  
Drainage Amount Moderate 1/27/2020  9:54 AM  
Drainage Color Purulent;Yellow 1/27/2020  9:54 AM  
Wound Odor Strong 1/27/2020  9:54 AM  
Annette-wound Assessment Black 1/27/2020  9:54 AM  
Dressing Type Applied Gauze 1/27/2020  9:54 AM  
Procedure Tolerated Fair 1/27/2020  9:54 AM  
Number of days: 29 Wound Buttocks Lateral;Mid;Right Unstageable Pressure Ulcer 12/29/19 (Active) Dressing Status Removed 1/27/2020  9:54 AM  
Dressing Type Silicone 7/74/7819  9:62 AM  
Pressure Injury Unstageable 1/27/2020  9:54 AM  
Wound Length (cm) 8.9 cm 1/27/2020  9:54 AM  
Wound Width (cm) 18 cm 1/27/2020  9:54 AM  
Wound Surface Area (cm^2) 160.2 cm^2 1/27/2020  9:54 AM  
Condition of Base Eschar 1/27/2020  9:54 AM  
Condition of Edges Closed 1/27/2020  9:54 AM  
Tissue Type Percent Black 100 % 1/27/2020  9:54 AM  
Drainage Amount None 1/27/2020  9:54 AM  
Annette-wound Assessment Black 1/27/2020  9:54 AM  
Margins Defined edges 1/27/2020  9:54 AM  
Dressing Type Applied Gauze 1/27/2020  9:54 AM  
Procedure Tolerated Fair 1/27/2020  9:54 AM  
Number of days: 29 Wound Back Right deep tissue injury (Active) Dressing Type Open to air 1/27/2020  9:54 AM  
Pressure Injury Deep Tissue Injury 1/27/2020  9:54 AM  
Wound Length (cm) 5.5 cm 1/27/2020  9:54 AM  
Wound Width (cm) 1.2 cm 1/27/2020  9:54 AM  
Wound Surface Area (cm^2) 6.6 cm^2 1/27/2020  9:54 AM  
Tissue Type Percent Maroon/Purple 100 % 1/27/2020  9:54 AM  
Drainage Amount None 1/27/2020  9:54 AM  
Wound Odor None 1/27/2020  9:54 AM  
Annette-wound Assessment Intact 1/27/2020  9:54 AM  
Dressing Type Applied Open to air 1/27/2020  9:54 AM  
Procedure Tolerated Fair 1/27/2020  9:54 AM  
Number of days: 0 Patient seen with wound care for assessment of tissue injury listed above. Spoke with bedside nurse regarding treatment plan understanding voiced. Bedside nursing to change dressings twice daily per order. Patient care turned over to nursing at this time.

## 2020-01-27 NOTE — PROGRESS NOTES
2100 bedside report given to me by RN Giles Miner. 2215 called [facility] to give report Pt has a mews of 5, paged hospitalist to inform him [phone] on phone with Dr. Rosa Beltran

## 2020-01-27 NOTE — PROGRESS NOTES
1100: Bedside and verbal report received from [place], [place]. Pt lying in bed, supine. No obvious s/s of distress. VS stable. BP's soft in the low 100's-high 80's, MAP's consistently <65. Pt on room air. Appears to breath through mouth as opposed to nose. SpO2 difficult to read via monitor. Sensor changed to left ear, %100 reading. Wound noted to right and left ichiums. Pt is lethargic, responds to voice but is not oriented. NSR on the monitor. Cerna noted. 200mL of meghann output. 1300: Pt sitting up in bed resting. VS stable. No s/s of distress. Daughter at bedside. 1600: TRANSFER - OUT REPORT: 
 
Verbal report given to Nimisha Tavera RN(name) on Yoko Hunt  being transferred to CVT stepdown(unit) for routine progression of care Report consisted of patients Situation, Background, Assessment and  
Recommendations(SBAR). Information from the following report(s) SBAR, Kardex, Intake/Output, MAR, Recent Results and Med Rec Status was reviewed with the receiving nurse. Lines:  
Peripheral IV 01/25/20 Left Forearm (Active) Site Assessment Clean, dry, & intact 1/27/2020 10:30 AM  
Phlebitis Assessment 0 1/27/2020 10:30 AM  
Infiltration Assessment 0 1/27/2020 10:30 AM  
Dressing Status Clean, dry, & intact 1/27/2020 10:30 AM  
Dressing Type Transparent 1/27/2020 10:30 AM  
Hub Color/Line Status Pink 1/27/2020 10:30 AM  
Action Taken Open ports on tubing capped 1/27/2020 10:30 AM  
Alcohol Cap Used Yes 1/27/2020 10:30 AM  
   
Peripheral IV 31/09/66 Right;Mid Basilic (Active) Site Assessment Clean, dry, & intact 1/27/2020 10:30 AM  
Phlebitis Assessment 0 1/27/2020 10:30 AM  
Infiltration Assessment 0 1/27/2020 10:30 AM  
Dressing Status Clean, dry, & intact 1/27/2020 10:30 AM  
Dressing Type Transparent 1/27/2020 10:30 AM  
Hub Color/Line Status Green 1/27/2020 10:30 AM  
Action Taken Open ports on tubing capped 1/27/2020 10:30 AM  
Alcohol Cap Used Yes 1/27/2020 10:30 AM  
   
 Peripheral IV 01/27/20 Left Antecubital (Active) Site Assessment Clean, dry, & intact 1/27/2020 10:30 AM  
Phlebitis Assessment 0 1/27/2020 10:30 AM  
Infiltration Assessment 0 1/27/2020 10:30 AM  
Dressing Status Clean, dry, & intact 1/27/2020 10:30 AM  
Dressing Type Transparent 1/27/2020 10:30 AM  
Hub Color/Line Status Blue 1/27/2020 10:30 AM  
Action Taken Open ports on tubing capped 1/27/2020 10:30 AM  
Alcohol Cap Used Yes 1/27/2020 10:30 AM  
  
 
Opportunity for questions and clarification was provided. Patient transported with: 
 Monitor Patient-specific medications from Pharmacy Registered Nurse

## 2020-01-27 NOTE — PROGRESS NOTES
Received report from [facility]. Pt Lethargic/Nonverbal, SR on monitor, Cerna Un stageable Pressure ulcer Rt. Hip, Cerna draining, pt resting in bed. 1030-Bedside report given to Felicia Junior.

## 2020-01-28 NOTE — PROGRESS NOTES
0700: Bedside shift change report given to Keith Porter RN (oncoming nurse) by Tc Crawford RN (offgoing nurse). Report included the following information SBAR, Kardex and Cardiac Rhythm NSR.  
 
1000: Dressing change completed to bilateral hips and sacral. Patient fairly tolerated. 1545: Patient bladder scanned because of poor output, 189 mL. Will follow up with provider. 1845: Dressing change completed to bilateral hips and sacral. Made Dr. Gal King aware of patient's output and bladder scan. Patient status is now DNR 
 
1900: Bedside shift change report given to [address] (oncoming nurse) by Keith Porter RN (offgoing nurse). Report included the following information SBAR, Kardex and Cardiac Rhythm NSR.

## 2020-01-28 NOTE — CONSULTS
Infectious Disease Consultation Note Reason: sepsis, necrotizing infection of bilateral hips Current abx Prior abx Cefepime, vancomycin since 1/25/20 Lines:  
 
 
Assessment : 
 
80 y.o. female with extensive past medical history including coronary artery disease with history of an STEMI, history of right breast cancer status post radiation, diet-controlled diabetes, GERD, glaucoma, hypertension, and dementia who presented to the ED brought in by her daughter on 1/25/20 for decreased p.o. intake over the past several days. CT scan 1/25-  Extensive subcutaneous emphysema within the gluteal regions bilaterally, much more extensive on the right than the left extending craniocaudally over a distance of at least 20 cm. 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 Left hip wound cultures: providencia, e.coli, enterococcus faecalis Right hip wound: providencia, enterococcus faecalis Acute renal failure: likely due to sepsis - nephrology f/u appreciated. Recommendations: 1. Continue cefepime, vancomycin for now 2. Antibiotics in absence of surgical debridement will not cure the infection. D/w daughter at bedside. Plans for pallliative care meeting in am. I recommend daughter to make ms. Paula Denver DNR if she doesn't wish surgery or escalation of care. She wishes to sign DNR. RN informed. Thank you for consultation request. Above plan was discussed in details with  family and dr Tamy Lopez. Please call me if any further questions or concerns. Will continue to participate in the care of this patient.  
HPI: 
 
80 y.o. female with extensive past medical history including coronary artery disease with history of an STEMI, history of right breast cancer status post radiation, diet-controlled diabetes, GERD, glaucoma, hypertension, and dementia who presented to the ED brought in by her daughter on 1/25/20 for decreased p.o. intake over the past several days. The daughter noted that she has had decreased p.o. intake for both fluids and food since Wednesday. Daughter states that she has been squeezing water into her mom's mouth with a sponge. She called her primary care physician and was directed to the emergency room. In the ED, she was noted to have leukocytosis with left shift but she has been afebrile, no tachycardia, and blood pressure has been within normal range. Her sodium is noted to be 154 and her creatinine 3.7 with a baseline of around 0.89. Her BUN elevated at 128. She is noted to have a right middle lobe infiltrate, daughter is uncertain if she is aspirating. In the ED she received azithromycin, vancomycin, and ceftriaxone, 2 L of normal saline, and was subsequently started on 1/2 NS. Straight cath done in ED around 8 pm yielded no urine after the 2 L boluses.  
  
Daughter at the bedside provides all of the history as the patient is nonverbal. Patient is noted to have several pressure ulcers, which are chronic and the patient's daughter states that wound care nurses come in 3 times weekly to assist with dressing change and monitoring the wounds. Daughter also states that she has an aide that comes in several times throughout the day, 7 days a week. The aide is actually a neighbor and for this reason is able to help her intermittently throughout the day. 
  
In the ED, CT of her chest and abdomen revealed extensive subcutaneous emphysema within the gluteal regions bilaterally, much more extensive on the right than the left and extending craniocaudally over a distance of at least 20 cm. Findings concerning for necrotizing infection.   The daughter is at the bedside and is made aware of these findings she states that she does not want to pursue surgery at this time, she expresses understanding that the patient is a poor surgical candidate, but she wants to consider and continue other treatments for now with regard to [address] she wants to keep her mom a full code for now. Patient's son resides in [place] and daughter needs to go home and call him as she does not have the phone number with her. Daughter also expresses that home health care nurses had suggested hospice to her in the past and she was considering this as an option. She declines palliative care at this time however, until she discusses with her brother. 
  
With regard to her baseline mental status, at times patient knows her name. She has been bedbound for 3 years, which the daughter attributes to arthritis and dementia. Daughter has only noted facial grimacing when turning the patient, no other indications of pain. Past Medical History:  
Diagnosis Date  ACS (acute coronary syndrome) ([address] NSTEMI  Arthritis  Breast cancer [facility]) 2007  
 right breast (radiation)  Dementia ([address]  Diabetes ([address] diet controlled  GERD (gastroesophageal reflux disease)  Glaucoma  Hypertension Past Surgical History:  
Procedure Laterality Date  HX APPENDECTOMY  HX BREAST BIOPSY  HX CHOLECYSTECTOMY home Medication List  
 Details  
clindamycin (CLEOCIN) 300 mg capsule Take 1 Cap by mouth three (3) times daily for 10 days. Qty: 30 Cap, Refills: 0 Associated Diagnoses: Pressure injury of contiguous region involving right buttock and hip, unstageable ([address] Pressure injury of contiguous region involving left buttock and hip, unstageable ([address] Pressure injury of sacral region, unstageable ([address] memantine (NAMENDA) 10 mg tablet Take 1 Tab by mouth daily. 1 tab every am 
Qty: 90 Tab, Refills: 5 MULTIVITAMIN PO Take 1 Tab by mouth daily. raNITIdine (ZANTAC) 150 mg tablet Take 1 Tab by mouth two (2) times a day. Qty: 180 Tab, Refills: 0 Comments: Please inform patient if she has ever had any recalled medications dispensed. Associated Diagnoses: Gastroesophageal reflux disease with esophagitis  
  
netarsudil mesylate (RHOPRESSA OP) Administer 1 Drop to both eyes daily. CALCIUM 600 600 mg calcium (1,500 mg) tablet take 1 tablet by mouth twice a day 
Qty: 60 Tab, Refills: 6 Associated Diagnoses: Medication refill  
  
dorzolamide (TRUSOPT) 2 % ophthalmic solution Administer 2 Drops to both eyes three (3) times daily. aspirin delayed-release 81 mg tablet take 1 tablet by mouth once daily 
Qty: 30 Tab, Refills: 6 Associated Diagnoses: Medication refill; Essential hypertension  
  
latanoprost (XALATAN) 0.005 % ophthalmic solution Administer 1 Drop to both eyes nightly. ferrous sulfate 324 mg (65 mg iron) tablet Take 324 mg by mouth daily. 1 tab before breakfast  
  
atorvastatin (LIPITOR) 80 mg tablet TAKE 1 TABLET EVERY DAY AT BEDTIME Qty: 90 Tab, Refills: 3 Associated Diagnoses: Hyperlipidemia, unspecified hyperlipidemia type  
  
lisinopril (PRINIVIL, ZESTRIL) 5 mg tablet TAKE 1 TABLET EVERY DAY Qty: 90 Tab, Refills: 1  
  
hydrocortisone (ALA-DEEDEE) 1 % lotion Apply  to affected area two (2) times a day. use thin layer Qty: 60 g, Refills: 2  
  
acetaminophen (TYLENOL EXTRA STRENGTH) 500 mg tablet Take 500 mg by mouth every six (6) hours as needed for Fever or Pain. 1 tab  
  
peg 400-propylene glycol (SYSTANE) 0.4-0.3 % drop Administer 1 Drop to left eye as needed for Other (PRN Dry eyes). PRN Dry eyes Current Facility-Administered Medications Medication Dose Route Frequency  sodium hypochlorite (QUARTER STRENGTH DAKIN'S) 0.125% irrigation (bottle)   Topical BID  insulin lispro (HUMALOG) injection   SubCUTAneous Q6H  
 cefepime (MAXIPIME) 0.5 g in 0.9% sodium chloride 100 mL IVPB  0.5 g IntraVENous Q24H  VANCOMYCIN INFORMATION NOTE   Other Rx Dosing/Monitoring  dorzolamide (TRUSOPT) 2 % ophthalmic solution 2 Drop  2 Drop Both Eyes TID  latanoprost (XALATAN) 0.005 % ophthalmic solution 1 Drop  1 Drop Both Eyes QHS  acetaminophen (TYLENOL) suppository 650 mg  650 mg Rectal Q6H PRN  
 ondansetron (ZOFRAN) injection 4 mg  4 mg IntraVENous Q6H PRN Allergies: Pcn [penicillins] Family History Problem Relation Age of Onset  Cancer Mother  Cancer Father  Diabetes Sister  Diabetes Paternal Grandmother  Cancer Other  Breast Cancer Daughter Social History Socioeconomic History  Marital status:  Spouse name: Not on file  Number of children: Not on file  Years of education: Not on file  Highest education level: Not on file Occupational History  Not on file Social Needs  Financial resource strain: Not on file  Food insecurity:  
  Worry: Not on file Inability: Not on file  Transportation needs:  
  Medical: Not on file Non-medical: Not on file Tobacco Use  Smoking status: Never Smoker  Smokeless tobacco: Never Used Substance and Sexual Activity  Alcohol use: No  
 Drug use: No  
 Sexual activity: Never Lifestyle  Physical activity:  
  Days per week: Not on file Minutes per session: Not on file  Stress: Not on file Relationships  Social connections:  
  Talks on phone: Not on file Gets together: Not on file Attends Yazdanism service: Not on file Active member of club or organization: Not on file Attends meetings of clubs or organizations: Not on file Relationship status: Not on file  Intimate partner violence:  
  Fear of current or ex partner: Not on file Emotionally abused: Not on file Physically abused: Not on file Forced sexual activity: Not on file Other Topics Concern  Not on file Social History Narrative  Not on file Social History Tobacco Use Smoking Status Never Smoker Smokeless Tobacco Never Used Temp (24hrs), Av.3 °F (36.3 °C), Min:96.5 °F (35.8 °C), Max:98.1 °F (36.7 °C) Visit Vitals /55 Pulse 79 Temp 97.2 °F (36.2 °C) Resp 18 Wt 47.5 kg (104 lb 12.8 oz) SpO2 100% BMI 16.41 kg/m² ROS: 12 point ROS obtained in details. Pertinent positives as mentioned in HPI,  
otherwise negative Physical Exam: 
 
 
Vitals signs and nursing note reviewed. Constitutional:   
   General: She is not in acute distress. Appearance: She is not toxic-appearing or diaphoretic. HENT:  
   Head: Normocephalic and atraumatic. Nose: Nose normal.  
   Mouth/Throat:  
   Mouth: Mucous membranes are dry Eyes:  
   General: No scleral icterus. Conjunctiva/sclera: Conjunctivae normal.  
   Pupils: Pupils are equal, round, and reactive to light. Neck: Musculoskeletal: Neck supple. No neck rigidity. Cardiovascular:  
   Rate and Rhythm: Normal rate. Heart sounds: Normal heart sounds. Pulmonary:  
   Effort: Pulmonary effort is normal. No respiratory distress. Breath sounds: No wheezing. Comments: Diminished lung sounds with poor effort Abdominal:  
   General: Bowel sounds are normal. There is no distension. Palpations: Abdomen is soft. Comments: Patient with dementia, but appears to be grimacing from palpation during my abdominal exam.  Has some generalized tenderness. No hernia noted or palpable pulsatile mass Musculoskeletal: Normal range of motion. General: No swelling or tenderness. Right lower leg: No edema. Left lower leg: No edema. Right hip stage 4 pressure ulcer poa, foul smelling with surrounding subcut fluctuance. Left  hip stage 4 pressure ulcer poa, foul smelling with surrounding subcut fluctuance. Lymphadenopathy:  
   Cervical: No cervical adenopathy. Skin: 
   General: Skin is warm and dry. Coloration: Skin is not jaundiced. Findings: No erythema or rash. Neurological: Comments: Both eyes open, patient is awake. Doesn't follow commands Equal strength upper and lower extremities although with generalized weakness Bilateral knees flexed No hyperreflexia or myoclonus Psychiatric:  
   Comments: Dementia  
  
  
  
 
Labs: Results:  
Chemistry Recent Labs  
  01/28/20 
6872 01/27/20 
0815 01/26/20 0431 01/25/20 
1830 GLU 89 265* 113* 167* * 147* 155* 154* K 3.8 3.8 3.6 4.5  
* 117* 123* 120* CO2 16* 20* 21 25 * 121* 117* 128* CREA 2.84* 3.10* 3.13* 3.70* CA 8.9 8.3* 8.3* 9.6 AGAP 16 10 11 9 BUCR 43* 39* 37* 35* AP  --   --   --  111 TP  --   --   --  7.1 ALB  --   --   --  1.8*  
GLOB  --   --   --  5.3* AGRAT  --   --   --  0.3* CBC w/Diff Recent Labs  
  01/26/20 
0431 01/25/20 
1830 WBC 29.4* 29.5*  
RBC 3.06* 3.54* HGB 9.1* 10.8* HCT 26.0* 30.4* * 161 GRANS 85* 96* LYMPH 1* 3*  
EOS 0 0 Microbiology Recent Labs  
  01/25/20 
2100 01/25/20 2020 01/25/20 2000 CULT MODERATE PROVIDENCIA STUARTII*  FEW POSSIBLE  2ND GRAM NEGATIVE LEO*  MODERATE ENTEROCOCCUS FAECALIS GROUP D*  MODERATE STREPTOCOCCUS VIRIDANS*  MODERATE DIPHTHEROIDS THREE MORPHOTYPES*  FEW ESCHERICHIA COLI*  FEW PROVIDENCIA STUARTII*  FEW ENTEROCOCCUS FAECALIS GROUP D*  FEW STREPTOCOCCUS VIRIDANS*  MODERATE DIPHTHEROIDS (TWO MORPHOTYPES)* NO GROWTH 3 DAYS NO GROWTH 3 DAYS  
  
 
 
RADIOLOGY: 
 
All available imaging studies/reports in [facility] care for this admission were reviewed Dr. Horace Dias, Infectious Disease Specialist 
[phone] January 28, 2020 
4:35 PM

## 2020-01-28 NOTE — PROGRESS NOTES
2030: Dressing changes performed. Pt tolerated fairly. Dressings were changed per order with dakins moistened gauze. Pt also had a bowel movement and was cleaned and changed. 2315: Ashley NOLEN d/t pt oxygen saturation 86-88%, bp dropping to mid 90 systolic. Received orders for albumin, normosol, chest xr and ABG. 0130: Pt is in bed with no signs of distress. Bp 108/45. Oxygen 100%.

## 2020-01-28 NOTE — PROGRESS NOTES
1600: Report received from [facility].  
 
1630: Pt arrived on unit. Pt had a moderate formed bowel movement. Changed gown and absorbent pads. Placed Pt on monitor. 1905: Bedside shift change report given to [facility] (oncoming nurse) by Krishna Buckner RN (offgoing nurse). Report included the following information SBAR, Kardex, Intake/Output, MAR, Recent Results and Med Rec Status. 1930: Picked up Pt's eye drops from [facility] patient specific bin. Assisted with wound care dressing change.

## 2020-01-28 NOTE — PROGRESS NOTES
[facility] Hospitalist Group Progress Note Patient: Diego Kaye Age: 80 y.o. : 2/3/1933 MR#: [id number] SSN: [id number] Date: 2020 Subjective/24-hour events: I saw patient twice today. Patient lying in bed, poorly responsive Assessment:  
Multiple pressure ulcers POA with suspected polymicrobial soft tissue infection secondary to right hip wound Hypernatremia is likely secondary to dehydration/volume depletion Acute kidney injury Possible aspiration pneumonia Unintentional weight loss Suspected severe protein calorie malnutrition Functional quadriplegia/bedbound at baseline Dementia Advanced age Plan: On abx, ID consult Family declines any surgical debridement Nephro following, monitor renal function Iv fluids Wound care Palliative care on case D/w Daughter Darius Reinoso at bedside regarding patients medical issues and poor prognosis and she connected me with the patients son Adela Isbell via her cellphone. Both Nando and Jim agree with making patient DNR and DNI. They are considering hospice. Orders placed for DNR. D/w RN Case discussed with:  []Patient  [x]Family  [x]Nursing  []Case Management DVT Prophylaxis:  []Lovenox  []Hep SQ  [x]SCDs  []Coumadin   []On Heparin gtt Objective:  
VS:  
Visit Vitals /55 Pulse 79 Temp 97.2 °F (36.2 °C) Resp 18 Wt 47.5 kg (104 lb 12.8 oz) SpO2 100% BMI 16.41 kg/m² Tmax/24hrs: Temp (24hrs), Av.2 °F (36.2 °C), Min:96.5 °F (35.8 °C), Max:98.1 °F (36.7 °C) Intake/Output Summary (Last 24 hours) at 2020 1843 Last data filed at 2020 1045 Gross per 24 hour Intake  Output 925 ml Net -925 ml General:  Nonverbal, poorly responsive Cardiovascular:  S1S2+, RRR Pulmonary:  Coarse bs b/l GI:  Soft, BS+, NT, ND Extremities:  No edema Multiple decubitii Labs:   
Recent Results (from the past 24 hour(s)) GLUCOSE, POC  Collection Time: 20  9:26 PM  
 Result Value Ref Range Glucose (POC) 143 (H) 70 - 110 mg/dL Scott Ratliffon Collection Time: 01/28/20  5:14 AM  
Result Value Ref Range Vancomycin, random 17.5 5.0 - 00.8 UG/ML  
METABOLIC PANEL, BASIC Collection Time: 01/28/20  5:14 AM  
Result Value Ref Range Sodium 147 (H) 136 - 145 mmol/L Potassium 3.8 3.5 - 5.5 mmol/L Chloride 115 (H) 100 - 111 mmol/L  
 CO2 16 (L) 21 - 32 mmol/L Anion gap 16 3.0 - 18 mmol/L Glucose 89 74 - 99 mg/dL  (H) 7.0 - 18 MG/DL Creatinine 2.84 (H) 0.6 - 1.3 MG/DL  
 BUN/Creatinine ratio 43 (H) 12 - 20 GFR est AA 19 (L) >60 ml/min/1.73m2 GFR est non-AA 16 (L) >60 ml/min/1.73m2 Calcium 8.9 8.5 - 10.1 MG/DL  
GLUCOSE, POC Collection Time: 01/28/20  6:06 AM  
Result Value Ref Range Glucose (POC) 111 (H) 70 - 110 mg/dL GLUCOSE, POC Collection Time: 01/28/20 11:57 AM  
Result Value Ref Range Glucose (POC) 47 (LL) 70 - 110 mg/dL GLUCOSE, POC Collection Time: 01/28/20 12:01 PM  
Result Value Ref Range Glucose (POC) 102 70 - 110 mg/dL GLUCOSE, POC Collection Time: 01/28/20  5:39 PM  
Result Value Ref Range Glucose (POC) 100 70 - 110 mg/dL Signed By: Soco Mcgrath MD   
 January 28, 2020

## 2020-01-28 NOTE — PROGRESS NOTES
NUTRITION Nursing Referral: Pressure Injury RECOMMENDATIONS / PLAN:  
 
- Evaluate ability to tolerate oral diet versus feeding tube placement to provide enteral nutrition support, pending plan of care goals - Consider starting dextrose-containing IV fluids if pt remains NPO\ 
- Continue RD inpatient monitoring and evaluation. NUTRITION INTERVENTIONS & DIAGNOSIS:  
 
- Meals/snacks: NPO 
- IV fluids, pending MD assessment - Collaboration and referral of nutrition care: Discussed plan of care and nutrition recommendations with Dr. Jocy Hardin MD to assess prior to starting IV fluids. Nutrition Diagnosis: Inadequate oral intake related to altered mental status and swallowing difficulty as evidenced by clinically significant dehydration and unintentional weight loss of 22.3 % in the last month. Patient meets criteria for Severe Protein Calorie Malnutrition as evidenced by:  
ASPEN Malnutrition Criteria Acute Illness, Chronic Illness, or Social/Enviornmental: Chronic illness Weight Loss: Greater than 5% x 1 mo Body Fat Loss: Severe(orbital, thoracic body regions) Muscle Mass Loss: Severe(temporal, patellar/anterior thigh, clavicle body regions) ASPEN Malnutrition Score - Chronic Illness: 18 Chronic Illness - Malnutrition Diagnosis: Severe malnutrition. ASSESSMENT:  
 
Pt admitted with severe ROSLYN due to dehydration and sepsis. Pt is not verbal. Limited NFPE performed. Per chart review, pt's daughter reports decreased fluid and food intake for the last several days, significant weight loss noted. Multiple pressure injuries noted. Pt is currently NPO awaiting goals of care discussion with palliative. Hospice meeting with daughter on 1/29. Nutritional intake adequate to meet patients estimated nutritional needs:  No 
 
Diet: DIET NPO Food Allergies: NKFA Current Appetite: Not Applicable - NPO Appetite/meal intake prior to admission: Poor x several days, per daughter's report Feeding Limitations:  [x] Swallowing difficulty    [] Chewing difficulty    [x] Other: altered mental status Current Meal Intake: No data found. BM: 1/28 Skin Integrity: Multiple pressure injuries: unstageable pressure injuries to bilateral buttocks & sacrum; DTI to back Edema:   [x] No     [] Yes Pertinent Medications: SSI, ondansetron Recent Labs  
  01/28/20 
0514 01/27/20 
0815 01/26/20 
0431 01/25/20 
1830 * 147* 155* 154* K 3.8 3.8 3.6 4.5  
* 117* 123* 120* CO2 16* 20* 21 25 GLU 89 265* 113* 167* * 121* 117* 128* CREA 2.84* 3.10* 3.13* 3.70* CA 8.9 8.3* 8.3* 9.6 MG  --   --   --  2.8* PHOS  --   --  3.7  --   
ALB  --   --   --  1.8* SGOT  --   --   --  31 ALT  --   --   --  16 Intake/Output Summary (Last 24 hours) at 1/28/2020 1325 Last data filed at 1/28/2020 1045 Gross per 24 hour Intake 560 ml Output 925 ml Net -365 ml Anthropometrics: 
Ht Readings from Last 1 Encounters:  
01/21/20 5' 7\" (1.702 m) Last 3 Recorded Weights in this Encounter 01/25/20 1737 01/28/20 0340 Weight: 56.7 kg (125 lb) 47.5 kg (104 lb 12.8 oz) Body mass index is 16.41 kg/m². Underweight Weight History: -30 lbs, 22.3% Weight loss x 1.5 month per chart Weight Metrics 1/28/2020 1/21/2020 12/17/2019 12/17/2019 9/4/2019 6/24/2019 4/15/2019 Weight 104 lb 12.8 oz 128 lb 134 lb 134 lb 136 lb 136 lb -  
BMI 16.41 kg/m2 20.05 kg/m2 20.99 kg/m2 20.99 kg/m2 21.3 kg/m2 21.3 kg/m2 21.93 kg/m2 Admitting Diagnosis: Community acquired pneumonia [J18.9] Acute renal failure (ARF) (HCC) [N17.9] Severe dehydration [E86.0] Hypernatremia [E87.0] Leukocytosis [D72.829] Dementia ([facility] [address] [B98.25] Caloric malnutrition ([facility] [place] Failure to thrive in adult [R62.7] Pertinent PMHx:  GERD, CAD/STEMI, Breast Cancer, DM2, HTN, Cholecystectomy Education Needs:        [x] None identified  [] Identified - Not appropriate at this time  []  Identified and addressed - refer to education log Learning Limitations:   [] None identified  [x] Identified: Dementia, nonverbal 
Cultural, Confucianism & ethnic food preferences:  [x] None identified    [] Identified and addressed ESTIMATED NUTRITION NEEDS:  
 
Calories: 4019-7327 kcal (35-40 kcal/kg) based on  [x] Actual BW: 47 kg      [] IBW Protein: 56-71 gm (1.2-1.5 gm/kg) based on  [x] Actual BW: 47 kg     [] IBW Fluid: 1 mL/kcal  
  
MONITORING & EVALUATION:  
 
Nutrition Goal(s):  
- Nutritional needs will be met through adequate oral intake or nutrition support within the next 5-7 days. Outcome: New/Initial goal   
- Provide nutrition intervention as appropriate with goals of care for the next 7 days. Outcome: New/Initial goal   
 
Monitoring:   [x] Food and nutrient intake   [x] Food and nutrient administration  [x] Comparative standards   [x] Nutrition-focused physical findings   [x] Anthropometric Measurements   [x] Treatment/therapy   [x] Biochemical data, medical tests, and procedures Previous Recommendations (for follow-up assessments only):  Not Applicable Discharge Planning: Nutritional discharge needs unknown at this time. Participated in care planning, discharge planning, & interdisciplinary rounds as appropriate. Deny Mak, Dietetic Intern Pager: [phone]

## 2020-01-28 NOTE — CONSULTS
Palliative Medicine Consult Patient Name: Claudette Ngo YOB: 1933 Date of Initial Consult: 1/28/2020 Reason for Consult: Goals of care discussions Requesting Provider: Benji Pedraza MD 
Primary Care Physician: DIAN Diana 
  
 SUMMARY:  
Claudette Ngo is a 80 y.o. with a past history of coronary artery disease with history of an STEMI, history of right breast cancer status post radiation, diet-controlled diabetes, GERD, glaucoma, hypertension, and dementia, who was admitted on 1/25/2020 from home with a diagnosis of Multiple pressure ulcers POA with suspected polymicrobial soft tissue infection secondary to right hip wound, hypernatremia, ROSLYN, possible aspiration pneumonia, and unintentional weight loss. Current medical issues leading to Palliative Medicine involvement include: goals of care discussions for an 80year old with advanced dementia and multiple co-morbidities. PALLIATIVE DIAGNOSES:  
1. Goals of care discussions 2. Pressure ulcers 3. Dementia 4. Severe protein calorie malnutrition 5. Debility PLAN:  
1. Goals of care discussions: I met with patient at patient's bedside. Patient is asleep, arouses to tactile stimuli with grunts, no eye opening and no command following. Nonverbal. Daughter Kiran Clark at bedside, and grateful for the visit. Discussed our role as palliative medicine and support offered as Allyson Menendez shares information about patient's sudden decline over the past two weeks. Per Allyson Menendez, patient was w/c bound with assist two weeks ago, and now completely bed-bound and not eating/talking. Patient has no AMD on file, not , and has a total of 4 living children. Per Allyson Menendez, 2 of the patient's children have been estranged for years and other family has no way of contacting them.  Decision making will default to the consensus of patient's 2 available children Kiran Clark and [employer]. Mitchel Patino states she and Maranda Altman are interested in hospice, as she does not want patient in the hospital long and does not want any surgical procedures should she need wound debridement, etc. Discussed the benefits and burdens of continued aggressive therapy versus comfort care/hospice. Hospice consult placed. Discussed the benefits and burdens of CPR in the event of cardiac or pulmonary arrest. Mitchel Patino realizes now that CPR would not be desired by patient, but we will need to discuss with patient's other son YASSINE Miranda prior to legally changing code status. Continue current level of care at this time: Full code with full interventions. Mitchel Patino to call our team with a good time tomorrow to meet with her in person and Jim over telephone (because he lives in [place]) to confirm goals of care. 2. Pressure ulcers: Multiple of sacral/hip areas POA per chart review. Suspected polymicrobial soft tissue infection secondary to right hip wound. On IVAB. Family does not want surgical interventions If needed. 3. Dementia: Baseline confused and w/c bound with assist transferring. FAST score 7F upon my assessment. 4. Severe protein calorie malnutrition: Body mass index is 16.41 kg/m². Not able to eat due to condition (asleep, arouses to tactile stimuli with grunts, no eye opening and no command following. Nonverbal). Discussed the benefits and burdens of feeding tubes with Mitchel Patino who agrees patient would not want/benefit from feeding tubes. Last Albumin 1.8 on 1/25/2020. 
5. Debility: Prior to two week ago, patient was w/c bound, followed simple commands, and lived with daughter Mitchel Patino and had caregiver (neighbor) 7 days a week as needed. Completely bedbound now, no command following. 6. Initial consult note routed to primary continuity provider 7. Communicated plan of care with: Palliative IDT, patient's family, attending GOALS OF CARE / TREATMENT PREFERENCES:  
[====Goals of Care====] GOALS OF CARE: Full code with full interventions Patient/Health Care Proxy Stated Goals: Prolong life TREATMENT PREFERENCES:  
Code Status: Full Code Advance Care Planning: 
Advance Care Planning 1/27/2020 Patient's Healthcare Decision Maker is: -  
Primary Decision Maker Name -  
Primary Decision Maker Phone Number -  
Primary Decision Maker Relationship to Patient -  
Confirm Advance Directive None Patient Would Like to Complete Advance Directive Yes Does the patient have other document types - Medical Interventions: Full interventions The palliative care team has discussed with patient / health care proxy about goals of care / treatment preferences for patient. 
[====Goals of Care====] HISTORY:  
 
History obtained from: patient's chart, family CHIEF COMPLAINT: n/a 
 
HPI/SUBJECTIVE: The patient is:  
[] Verbal and participatory [x] Non-participatory due to:  
asleep, arouses to tactile stimuli with grunts, no eye opening and no command following. Nonverbal 
 
Clinical Pain Assessment (nonverbal scale for severity on nonverbal patients): Adult Nonverbal Pain Scale Face: No particular expression or smile Activity (Movement): Lying quietly, normal position Guarding: Lying quietly, no positioning of hands over areas of body Physiology (Vital Signs): Stable vital signs Respiratory: Baseline RR/SpO2 compliant with ventilator Total Score: 0 
 
 
 FUNCTIONAL ASSESSMENT:  
 
Palliative Performance Scale (PPS): PPS: 10 PSYCHOSOCIAL/SPIRITUAL SCREENING:  
 
Advance Care Planning: 
Advance Care Planning 1/27/2020 Patient's Healthcare Decision Maker is: -  
Primary Decision Maker Name -  
Primary Decision Maker Phone Number -  
Primary Decision Maker Relationship to Patient -  
Confirm Advance Directive None Patient Would Like to Complete Advance Directive Yes Does the patient have other document types - Any spiritual / Catholic concerns: [] Yes /  [x] No 
 
Caregiver Burnout: 
[] Yes /  [x] No /  [] No Caregiver Present Anticipatory grief assessment:  
[x] Normal  / [] Maladaptive REVIEW OF SYSTEMS:  
 
Positive and pertinent negative findings in ROS are noted above in HPI. The following systems were [] reviewed / [x] unable to be reviewed as noted in HPI Other findings are noted below. Systems: constitutional, ears/nose/mouth/throat, respiratory, gastrointestinal, genitourinary, musculoskeletal, integumentary, neurologic, psychiatric, endocrine. Positive findings noted below. Modified ESAS Completed by: provider PHYSICAL EXAM:  
 
From RN flowsheet: 
Wt Readings from Last 3 Encounters:  
01/28/20 47.5 kg (104 lb 12.8 oz) 01/21/20 58.1 kg (128 lb) 12/17/19 60.8 kg (134 lb) Blood pressure 107/55, pulse 79, temperature 97.2 °F (36.2 °C), resp. rate 18, weight 47.5 kg (104 lb 12.8 oz), SpO2 100 %. Pain Scale 1: Visual 
Pain Intensity 1: 0 Constitutional: asleep, arouses to tactile stimuli with grunts, no eye opening and no command following. Nonverbal 
Eyes: closed ENMT: no nasal discharge, dry mucous membranes Cardiovascular: regular rhythm, distal pulses intact Respiratory: breathing not labored, symmetric Gastrointestinal: soft non-tender, +bowel sounds Musculoskeletal: no deformity, no tenderness to palpation Skin: warm, dry Neurologic: not following commands HISTORY:  
 
Active Problems: Hypernatremia (1/25/2020) Leukocytosis (1/25/2020) Community acquired pneumonia (1/25/2020) Dementia ([address] (1/25/2020) Severe dehydration (1/25/2020) Failure to thrive in adult (1/25/2020) Acute renal failure (ARF) ([address] (1/25/2020) Caloric malnutrition ([address] (1/25/2020) Past Medical History:  
Diagnosis Date  ACS (acute coronary syndrome) ([address] NSTEMI  Arthritis  Breast cancer ([address] 2007 right breast (radiation)  Dementia ([facility]  Diabetes ([facility] [address] diet controlled  GERD (gastroesophageal reflux disease)  Glaucoma  Hypertension Past Surgical History:  
Procedure Laterality Date  HX APPENDECTOMY  HX BREAST BIOPSY  HX CHOLECYSTECTOMY Family History Problem Relation Age of Onset  Cancer Mother  Cancer Father  Diabetes Sister  Diabetes Paternal Grandmother  Cancer Other  Breast Cancer Daughter History reviewed, no pertinent family history. Social History Tobacco Use  Smoking status: Never Smoker  Smokeless tobacco: Never Used Substance Use Topics  Alcohol use: No  
 
Allergies Allergen Reactions  Pcn [Penicillins] Other (comments) Unknown-happened when she was very young Current Facility-Administered Medications Medication Dose Route Frequency  sodium hypochlorite (QUARTER STRENGTH DAKIN'S) 0.125% irrigation (bottle)   Topical BID  insulin lispro (HUMALOG) injection   SubCUTAneous Q6H  
 cefepime (MAXIPIME) 0.5 g in 0.9% sodium chloride 100 mL IVPB  0.5 g IntraVENous Q24H  VANCOMYCIN INFORMATION NOTE   Other Rx Dosing/Monitoring  dorzolamide (TRUSOPT) 2 % ophthalmic solution 2 Drop  2 Drop Both Eyes TID  latanoprost (XALATAN) 0.005 % ophthalmic solution 1 Drop  1 Drop Both Eyes QHS  acetaminophen (TYLENOL) suppository 650 mg  650 mg Rectal Q6H PRN  
 ondansetron (ZOFRAN) injection 4 mg  4 mg IntraVENous Q6H PRN  
 
 
 
 LAB AND IMAGING FINDINGS:  
 
Lab Results Component Value Date/Time WBC 29.4 (H) 01/26/2020 04:31 AM  
 HGB 9.1 (L) 01/26/2020 04:31 AM  
 PLATELET 739 (L) 14/94/4849 04:31 AM  
 
Lab Results Component Value Date/Time  Sodium 147 (H) 01/28/2020 05:14 AM  
 Potassium 3.8 01/28/2020 05:14 AM  
 Chloride 115 (H) 01/28/2020 05:14 AM  
 CO2 16 (L) 01/28/2020 05:14 AM  
  (H) 01/28/2020 05:14 AM  
 Creatinine 2.84 (H) 01/28/2020 05:14 AM  
 Calcium 8.9 01/28/2020 05:14 AM  
 Magnesium 2.8 (H) 01/25/2020 06:30 PM  
 Phosphorus 3.7 01/26/2020 04:31 AM  
  
Lab Results Component Value Date/Time AST (SGOT) 31 01/25/2020 06:30 PM  
 Alk. phosphatase 111 01/25/2020 06:30 PM  
 Protein, total 7.1 01/25/2020 06:30 PM  
 Albumin 1.8 (L) 01/25/2020 06:30 PM  
 Globulin 5.3 (H) 01/25/2020 06:30 PM  
 
Lab Results Component Value Date/Time INR 1.0 10/30/2017 01:35 PM  
 Prothrombin time 13.0 10/30/2017 01:35 PM  
  
No results found for: IRON, FE, TIBC, IBCT, PSAT, FERR No results found for: PH, PCO2, PO2 No components found for: [place] Lab Results Component Value Date/Time  (H) 05/18/2019 10:27 PM  
 CK - MB 9.6 (H) 05/18/2019 10:27 PM  
  
 
 
   
 
Total time: 70 minutes Counseling / coordination time, spent as noted above: 60 minutes 
> 50% counseling / coordination?: yes, patient, family, attending Prolonged service was provided for  []30 min   []75 min in face to face time in the presence of the patient, spent as noted above. Time Start:  
Time End:  
Note: this can only be billed with 50794 (initial) or 02803 (follow up). If multiple start / stop times, list each separately.

## 2020-01-28 NOTE — WOUND CARE
Physical Exam 
Musculoskeletal:  
     Legs: 
 
 
Patient seen in ED with staff nurse [facility] DELANEY. Assessment as follows and orders written for treatment. Nathalie Mayer, RN Registered Nurse NURSING Progress Notes Signed Date of Service:  01/27/20 1020 Wound Sacrum Lower Stage II Pressure ulcer 12/29/19 (Active) Dressing Status Removed 1/27/2020  9:54 AM  
Dressing Type Gauze 1/27/2020  9:54 AM  
Pressure Injury Unstageable 1/27/2020  9:54 AM  
Wound Length (cm) 6.5 cm 1/27/2020  9:54 AM  
Wound Width (cm) 9 cm 1/27/2020  9:54 AM  
Wound Surface Area (cm^2) 58.5 cm^2 1/27/2020  9:54 AM  
Condition of Base Eschar 1/27/2020  9:54 AM  
Condition of Edges Closed 1/27/2020  9:54 AM  
Tissue Type Percent Black 95 % 1/27/2020  9:54 AM  
Tissue Type Percent Red 5 1/27/2020  9:54 AM  
Drainage Amount Scant 1/27/2020  9:54 AM  
Drainage Color Serosanguinous 1/27/2020  9:54 AM  
Wound Odor Mild 1/27/2020  9:54 AM  
Annette-wound Assessment Black 1/27/2020  9:54 AM  
Margins Defined edges 1/27/2020  9:54 AM  
Dressing Type Applied Gauze 1/27/2020  9:54 AM  
Procedure Tolerated Fair 1/27/2020  9:54 AM  
Number of days: 29 Wound Buttocks Lateral;Left;Mid Unstageable Pressure ulcer 12/29/19 (Active) Dressing Status Removed 1/27/2020  9:54 AM  
Dressing Type Gauze 1/27/2020  9:54 AM  
Pressure Injury Unstageable 1/27/2020  9:54 AM  
Wound Length (cm) 8.5 cm 1/27/2020  9:54 AM  
Wound Width (cm) 9.2 cm 1/27/2020  9:54 AM  
Wound Surface Area (cm^2) 78.2 cm^2 1/27/2020  9:54 AM  
Condition of Base Eschar;Slough;Muscle exposed 1/27/2020  9:54 AM  
Condition of Edges Closed 1/27/2020  9:54 AM  
Tissue Type Percent Black 50 % 1/27/2020  9:54 AM  
Tissue Type Percent Red 50 1/27/2020  9:54 AM  
Undermining (cm) 5 cm 1/27/2020  9:54 AM  
Direction of Undermining 12 o'clock 1/27/2020  9:54 AM  
Drainage Amount Moderate 1/27/2020  9:54 AM  
 Drainage Color Purulent;Yellow 1/27/2020  9:54 AM  
Wound Odor Strong 1/27/2020  9:54 AM  
Annette-wound Assessment Black 1/27/2020  9:54 AM  
Dressing Type Applied Gauze 1/27/2020  9:54 AM  
Procedure Tolerated Fair 1/27/2020  9:54 AM  
Number of days: 29 Wound Buttocks Lateral;Mid;Right Unstageable Pressure Ulcer 12/29/19 (Active) Dressing Status Removed 1/27/2020  9:54 AM  
Dressing Type Silicone 6/60/7848  4:90 AM  
Pressure Injury Unstageable 1/27/2020  9:54 AM  
Wound Length (cm) 8.9 cm 1/27/2020  9:54 AM  
Wound Width (cm) 18 cm 1/27/2020  9:54 AM  
Wound Surface Area (cm^2) 160.2 cm^2 1/27/2020  9:54 AM  
Condition of Base Eschar 1/27/2020  9:54 AM  
Condition of Edges Closed 1/27/2020  9:54 AM  
Tissue Type Percent Black 100 % 1/27/2020  9:54 AM  
Drainage Amount None 1/27/2020  9:54 AM  
Annette-wound Assessment Black 1/27/2020  9:54 AM  
Margins Defined edges 1/27/2020  9:54 AM  
Dressing Type Applied Gauze 1/27/2020  9:54 AM  
Procedure Tolerated Fair 1/27/2020  9:54 AM  
Number of days: 29 Wound Back Right deep tissue injury (Active) Dressing Type Open to air 1/27/2020  9:54 AM  
Pressure Injury Deep Tissue Injury 1/27/2020  9:54 AM  
Wound Length (cm) 5.5 cm 1/27/2020  9:54 AM  
Wound Width (cm) 1.2 cm 1/27/2020  9:54 AM  
Wound Surface Area (cm^2) 6.6 cm^2 1/27/2020  9:54 AM  
Tissue Type Percent Maroon/Purple 100 % 1/27/2020  9:54 AM  
Drainage Amount None 1/27/2020  9:54 AM  
Wound Odor None 1/27/2020  9:54 AM  
Annette-wound Assessment Intact 1/27/2020  9:54 AM  
Dressing Type Applied Open to air 1/27/2020  9:54 AM  
Procedure Tolerated Fair 1/27/2020  9:54 AM  
Number of days: 0 Patient seen with wound care for assessment of tissue injury listed above. Spoke with bedside nurse regarding treatment plan understanding voiced. Bedside nursing to change dressings twice daily per order. Patient care turned over to nursing at this time.

## 2020-01-28 NOTE — PROGRESS NOTES
visited with the family of Philly Nix, who is a 80 y.o.,female. The  provided the following Interventions: 
Initiated a relationship of care and support. Offered prayer and assurance of continued prayers on patients behalf. Advance Care Planning:  
 
Patient's daughter stated that she would like her mother to be in hospice care. Plan: 
Chaplains will continue to follow and will provide pastoral care on an as needed/requested basis.  recommends bedside caregivers page  on duty if patient shows signs of acute spiritual or emotional distress. [address] [facility] Spiritual Care  
[phone]

## 2020-01-28 NOTE — PROGRESS NOTES
Palliative Medicine Telephone call made to Kwesi Mendez (pt's daughter) ([phone]) - no answer; unable to leave VM. Will try again in attempts to set up family meeting to discuss goals of care. Thank you for the Palliative Medicine consult and allowing us to participate in the care of Ms. Amy Melendez. Will continue to monitor and provide support. JOSH SimonN Palliative Medicine Inpatient RN DR. KELLER [facility] Palliative [place] Line: [phone] ([phone])

## 2020-01-28 NOTE — HOSPICE
Referral received. Chart review in process. Daughter requested to meet hospice at bedside 1/29/2020 @ 1pm. 
 
 
 
[facility] Please contact [phone] with any questions or concerns. TASHA Lin, RN Clinical Coordinator [facility] [address] [place], [address] [place] [address] Str. 
[phone]

## 2020-01-28 NOTE — PROGRESS NOTES
Problem: Falls - Risk of 
Goal: *Absence of Falls Description Document Cruz Lema Fall Risk and appropriate interventions in the flowsheet. Outcome: Progressing Towards Goal 
Note: Fall Risk Interventions: 
  
 
Mentation Interventions: Adequate sleep, hydration, pain control, Bed/chair exit alarm, Door open when patient unattended, Evaluate medications/consider consulting pharmacy Medication Interventions: Bed/chair exit alarm Elimination Interventions: Bed/chair exit alarm, Toileting schedule/hourly rounds Problem: Patient Education: Go to Patient Education Activity Goal: Patient/Family Education Outcome: Progressing Towards Goal 
  
Problem: Patient Education: Go to Patient Education Activity Goal: Patient/Family Education Outcome: Progressing Towards Goal 
  
Problem: Risk for Spread of Infection Goal: Prevent transmission of infectious organism to others Description Prevent the transmission of infectious organisms to other patients, staff members, and visitors. Outcome: Progressing Towards Goal 
  
Problem: Patient Education:  Go to Education Activity Goal: Patient/Family Education Outcome: Progressing Towards Goal

## 2020-01-28 NOTE — PROGRESS NOTES
Attempted to call pt's daughter listed on face sheet to complete initial assessment. Pt appears to have been open to [facility]. No answer, unable to leave message. Elidia Vinson, [phone]

## 2020-01-28 NOTE — PROGRESS NOTES
Problem: Falls - Risk of 
Goal: *Absence of Falls Description Document Clide Failing Fall Risk and appropriate interventions in the flowsheet. Outcome: Progressing Towards Goal 
Note: Fall Risk Interventions: 
  
 
Mentation Interventions: Adequate sleep, hydration, pain control, Bed/chair exit alarm, Door open when patient unattended, More frequent rounding Medication Interventions: Evaluate medications/consider consulting pharmacy, Bed/chair exit alarm Elimination Interventions: Bed/chair exit alarm, Toileting schedule/hourly rounds Problem: Patient Education: Go to Patient Education Activity Goal: Patient/Family Education Outcome: Progressing Towards Goal 
  
Problem: Pressure Injury - Risk of 
Goal: *Prevention of pressure injury Description Document Bebo Scale and appropriate interventions in the flowsheet. Outcome: Progressing Towards Goal 
Note: Pressure Injury Interventions: 
Sensory Interventions: Assess changes in LOC, Assess need for specialty bed, Check visual cues for pain Moisture Interventions: Absorbent underpads, Apply protective barrier, creams and emollients, Check for incontinence Q2 hours and as needed, Contain wound drainage, Maintain skin hydration (lotion/cream), Internal/External urinary devices Activity Interventions: Assess need for specialty bed Mobility Interventions: Pressure redistribution bed/mattress (bed type), Assess need for specialty bed, Turn and reposition approx. every two hours(pillow and wedges) Nutrition Interventions: Document food/fluid/supplement intake, Discuss nutritional consult with provider Friction and Shear Interventions: Apply protective barrier, creams and emollients, Lift team/patient mobility team, Foam dressings/transparent film/skin sealants Problem: Patient Education: Go to Patient Education Activity Goal: Patient/Family Education Outcome: Progressing Towards Goal 
  
 Problem: Risk for Spread of Infection Goal: Prevent transmission of infectious organism to others Description Prevent the transmission of infectious organisms to other patients, staff members, and visitors. Outcome: Progressing Towards Goal 
  
Problem: Patient Education:  Go to Education Activity Goal: Patient/Family Education Outcome: Progressing Towards Goal

## 2020-01-28 NOTE — CONSULTS
Consult Note Consult requested by: Dr. Lani Tee is a 80 y.o. female [address] who is being seen on consult for ROSLYN Chief Complaint Patient presents with  Altered mental status Admission diagnosis: altered mental status 59-year-old female with past medical history of severe dementia, diabetes, bedbound admitted for severe sepsis, following for renal failure. Impression & Plan:  
IMPRESSION:  
Acute kidney injury, sepsis ATN, appears severe dehydrated Hypernatremia, from severe dehydration Metabolic acidosis, from severe renal failure Severe uremia Severe sepsis, source decubitus ulcer PLAN:  
Ms. Josefina Wolf has a severe sepsis from her sacral decubitus. She has a severe ROSLYN from dehydration and sepsis. Given her age and severe dementia she is not a candidate for any aggressive care including hemodialysis. I discussed my opinion and recommendation with daughter over phone. Daughter is interested for hospice care. Continue IV hydration until further cleared goal of care. Continue antibiotic per primary care. Adjust medication per current renal function status. Thank you very much for allowing me to participate in the care of this patient. We will continue to monitor with you and make recommendations as needed. 
  
 
 
HPI:   
Patient is not able to provide any history. Medical history was obtained from the chart review and discussion with the primary team. 59-year-old female with past medical history of coronary artery disease, history of breast cancer status post radiation dementia bedbound was brought to the emergency room for poor p.o. intake. In the emergency room she appears septic with severe leukocytosis. She was also dehydrated in the emergency room with her serum sodium was at 154 mEq/L and elevated BUN/creatinine.  
 
Her CT scan shows extensive subcutaneous emphysema within the gluteal region bilaterally. She has a chronic decubitus ulcer. Family refused for surgical intervention. She has been receiving IV antibiotic and fluid nephrology service consulted for further assistant for renal failure. During my evaluation she appears very calm, no respiratory distress. Not able to communicate or follow simple command. Past Medical History:  
Diagnosis Date  ACS (acute coronary syndrome) ([facility] [address] NSTEMI  Arthritis  Breast cancer [facility]) 2007  
 right breast (radiation)  Dementia ([facility] [address]  Diabetes ([facility] diet controlled  GERD (gastroesophageal reflux disease)  Glaucoma  Hypertension Past Surgical History:  
Procedure Laterality Date  HX APPENDECTOMY  HX BREAST BIOPSY  HX CHOLECYSTECTOMY Social History Socioeconomic History  Marital status:  Spouse name: Not on file  Number of children: Not on file  Years of education: Not on file  Highest education level: Not on file Occupational History  Not on file Social Needs  Financial resource strain: Not on file  Food insecurity:  
  Worry: Not on file Inability: Not on file  Transportation needs:  
  Medical: Not on file Non-medical: Not on file Tobacco Use  Smoking status: Never Smoker  Smokeless tobacco: Never Used Substance and Sexual Activity  Alcohol use: No  
 Drug use: No  
 Sexual activity: Never Lifestyle  Physical activity:  
  Days per week: Not on file Minutes per session: Not on file  Stress: Not on file Relationships  Social connections:  
  Talks on phone: Not on file Gets together: Not on file Attends Sabianism service: Not on file Active member of club or organization: Not on file Attends meetings of clubs or organizations: Not on file Relationship status: Not on file  Intimate partner violence:  
  Fear of current or ex partner: Not on file Emotionally abused: Not on file Physically abused: Not on file Forced sexual activity: Not on file Other Topics Concern  Not on file Social History Narrative  Not on file Family History Problem Relation Age of Onset  Cancer Mother  Cancer Father  Diabetes Sister  Diabetes Paternal Grandmother  Cancer Other  Breast Cancer Daughter Allergies Allergen Reactions  Pcn [Penicillins] Other (comments) Unknown-happened when she was very young Home Medications:  
 
Prior to Admission Medications Prescriptions Last Dose Informant Patient Reported? Taking? CALCIUM 600 600 mg calcium (1,500 mg) tablet 12/27/2019 at Unknown time  No Yes Sig: take 1 tablet by mouth twice a day Patient taking differently: Pt has 600 mg tabs take 1 tablet by mouth twice a day MULTIVITAMIN PO 1/20/2020 at Unknown time  Yes Yes Sig: Take 1 Tab by mouth daily. acetaminophen (TYLENOL EXTRA STRENGTH) 500 mg tablet Not Taking at Unknown time  Yes No  
Sig: Take 500 mg by mouth every six (6) hours as needed for Fever or Pain. 1 tab  
aspirin delayed-release 81 mg tablet 1/20/2020 at Unknown time  No Yes Sig: take 1 tablet by mouth once daily  
atorvastatin (LIPITOR) 80 mg tablet Not Taking at Unknown time  No No  
Sig: TAKE 1 TABLET EVERY DAY AT BEDTIME  
clindamycin (CLEOCIN) 300 mg capsule 1/20/2020 at Unknown time  No Yes Sig: Take 1 Cap by mouth three (3) times daily for 10 days. dorzolamide (TRUSOPT) 2 % ophthalmic solution 12/27/2019 at Unknown time  Yes Yes Sig: Administer 2 Drops to both eyes three (3) times daily. ferrous sulfate 324 mg (65 mg iron) tablet Not Taking at Unknown time  Yes No  
Sig: Take 324 mg by mouth daily. 1 tab before breakfast  
hydrocortisone (ALA-DEEDEE) 1 % lotion Not Taking at Unknown time  No No  
Sig: Apply  to affected area two (2) times a day. use thin layer latanoprost (XALATAN) 0.005 % ophthalmic solution 12/27/2019 at Unknown time  Yes Yes Sig: Administer 1 Drop to both eyes nightly. lisinopril (PRINIVIL, ZESTRIL) 5 mg tablet Not Taking at Unknown time  No No  
Sig: TAKE 1 TABLET EVERY DAY  
memantine (NAMENDA) 10 mg tablet 1/20/2020 at Unknown time  No Yes Sig: Take 1 Tab by mouth daily. 1 tab every am  
netarsudil mesylate (RHOPRESSA OP) 12/27/2019 at Unknown time  Yes Yes Sig: Administer 1 Drop to both eyes daily. peg 400-propylene glycol (SYSTANE) 0.4-0.3 % drop Not Taking at Unknown time  Yes No  
Sig: Administer 1 Drop to left eye as needed for Other (PRN Dry eyes). PRN Dry eyes  
raNITIdine (ZANTAC) 150 mg tablet 12/27/2019 at Unknown time  No Yes Sig: Take 1 Tab by mouth two (2) times a day. Facility-Administered Medications: None Current Facility-Administered Medications Medication Dose Route Frequency  vancomycin (VANCOCIN) 750 mg in  mL infusion  750 mg IntraVENous ONCE  
 sodium hypochlorite (QUARTER STRENGTH DAKIN'S) 0.125% irrigation (bottle)   Topical BID  insulin lispro (HUMALOG) injection   SubCUTAneous Q6H  
 cefepime (MAXIPIME) 0.5 g in 0.9% sodium chloride 100 mL IVPB  0.5 g IntraVENous Q24H  
 albuterol (PROVENTIL VENTOLIN) nebulizer solution 2.5 mg  2.5 mg Nebulization NOW  electrolyte-r (NORMOSOL R) infusion   IntraVENous CONTINUOUS  
 VANCOMYCIN INFORMATION NOTE   Other Rx Dosing/Monitoring  dorzolamide (TRUSOPT) 2 % ophthalmic solution 2 Drop  2 Drop Both Eyes TID  latanoprost (XALATAN) 0.005 % ophthalmic solution 1 Drop  1 Drop Both Eyes QHS  acetaminophen (TYLENOL) suppository 650 mg  650 mg Rectal Q6H PRN  
 ondansetron (ZOFRAN) injection 4 mg  4 mg IntraVENous Q6H PRN Review of Systems: As above Data Review: 
 
Labs: Results:  
   
Chemistry Recent Labs  
  01/28/20 
0514 01/27/20 
0815 01/26/20 
0431 01/25/20 
1830 GLU 89 265* 113* 167* * 147* 155* 154* K 3.8 3.8 3.6 4.5  
* 117* 123* 120* CO2 16* 20* 21 25 * 121* 117* 128* CREA 2.84* 3.10* 3.13* 3.70* CA 8.9 8.3* 8.3* 9.6 AGAP 16 10 11 9 BUCR 43* 39* 37* 35* AP  --   --   --  111 TP  --   --   --  7.1 ALB  --   --   --  1.8*  
GLOB  --   --   --  5.3* AGRAT  --   --   --  0.3* CBC w/Diff Recent Labs  
  01/26/20 
0431 01/25/20 
1830 WBC 29.4* 29.5*  
RBC 3.06* 3.54* HGB 9.1* 10.8* HCT 26.0* 30.4* * 161 GRANS 85* 96* LYMPH 1* 3*  
EOS 0 0 Coagulation No results for input(s): PTP, INR, APTT, INREXT in the last 72 hours. Iron/Ferritin No results for input(s): IRON in the last 72 hours. No lab exists for component: TIBCCALC BNP No results for input(s): BNPP in the last 72 hours. Cardiac Enzymes No results for input(s): CPK, CKND1, LUIGI in the last 72 hours. No lab exists for component: Maxine Mar Liver Enzymes Recent Labs  
  01/25/20 
1830 TP 7.1 ALB 1.8*  SGOT 31 Thyroid Studies Lab Results Component Value Date/Time TSH 1.50 05/18/2019 10:27 PM  
    
 EKG: 
 
Physical Assessment:  
 
Visit Vitals /58 Pulse 77 Temp 96.5 °F (35.8 °C) Resp 18 Wt 47.5 kg (104 lb 12.8 oz) SpO2 100% BMI 16.41 kg/m² Weight change:  
 
Intake/Output Summary (Last 24 hours) at 1/28/2020 1146 Last data filed at 1/28/2020 1045 Gross per 24 hour Intake 560 ml Output 925 ml Net -365 ml Physical Exam:  
General: cachexia , no respi HEENT supple neck,  
CVS: S1S2 heard,  no rub RS: + air entry b/l, Abd: Soft, Non tender, Neuro: severe dementia Extrm: no edema,  
Skin: severe sacral decubitus Musculoskeletal: No gross joints or bone deformities Procedures/imaging: see electronic medical records for all procedures, Xrays and details which were not copied into this note but were reviewed prior to creation of Ricarda Ray MD 
January 28, 2020 [facility] [phone]

## 2020-01-29 NOTE — PROGRESS NOTES
Reason for Admission:  Community acquired pneumonia [J18.9] Acute renal failure (ARF) (HCC) [N17.9] Severe dehydration [E86.0] Hypernatremia [E87.0] Leukocytosis [D72.829] Dementia ([facility] [address] [F75.12] Caloric malnutrition ([facility] [address] [place] Failure to thrive in adult [R62.7] RRAT Score:    13% Plan for utilizing home health:    Likely hospice Likelihood of Readmission:   LOW Transition of Care Plan:         
 
 
Initial assessment completed with daughter, Vandana Bishop. Cognitive status of patient: disoriented. Face sheet information confirmed:  yes. This patient lives in a single family home with patient and daughter. Patient is not able to navigate steps as needed. Prior to hospitalization, patient was considered to be independent with ADLs/IADLS : no . If not independent,  patient needs assist with : dressing, bathing, food preparation, cooking, toileting, grooming and self feeding Patient has a current ACP document on file: no The pt will need medical transport home upon discharge. The patient already has W/C, [employer] lift, hospital bed, available in the home. Patient is currently active with home health. If active, agency name is [id number]. Was  stay within last 60 days : no. This patient is on dialysis :no 
  Freedom of choice signed: no. Currently, the discharge plan is Hospice. Pt currently has [facility] 40hrs/week personal care. The patient states that she can obtain her medications from the pharmacy, and take her medications as directed. Patient's current insurance is [employer] Care Management Interventions PCP Verified by CM: Yes 
Palliative Care Criteria Met (RRAT>21 & CHF Dx)?: Yes 
Palliative Consult Recommended?: Yes Mode of Transport at Discharge: [facility] Transition of Care Consult (CM Consult): Home Hospice [facility]: Yes Discharge Durable Medical Equipment: No 
Physical Therapy Consult: No 
 Occupational Therapy Consult: No 
Speech Therapy Consult: No 
Current Support Network: Lives with Caregiver(daughter) Confirm Follow Up Transport: Family Discharge Location Discharge Placement: Home with hospice [facility] [place], [address]

## 2020-01-29 NOTE — PROGRESS NOTES
Infectious Disease Consultation Note Reason: sepsis, necrotizing infection of bilateral hips Current abx Prior abx Cefepime, vancomycin since 1/25/20 Lines:  
 
 
Assessment : 
 
80 y.o. female with extensive past medical history including coronary artery disease with history of an STEMI, history of right breast cancer status post radiation, diet-controlled diabetes, GERD, glaucoma, hypertension, and dementia who presented to the ED brought in by her daughter on 1/25/20 for decreased p.o. intake over the past several days. CT scan 1/25-  Extensive subcutaneous emphysema within the gluteal regions bilaterally, much more extensive on the right than the left extending craniocaudally over a distance of at least 20 cm. 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 Left hip wound cultures: providencia, e.coli, enterococcus faecalis Right hip wound: providencia, enterococcus faecalis Acute renal failure: likely due to sepsis - nephrology f/u appreciated. Worsening leukocytosis, thrombocytopenia - likely progressive sepsis related Recommendations: 1. Continue cefepime, vancomycin for now 2. Antibiotics in absence of surgical debridement will not cure the infection. D/w daughter at bedside. Plans for pallliative care meeting today. Will not escalate abx per discussion with daughter yesterday. Above plan was discussed in details with dr Mitchel Dodge. Please call me if any further questions or concerns. Will continue to participate in the care of this patient. HPI: 
 
Non verbal. Unable to obtain detailed ROS 
 
 
home Medication List  
 Details  
clindamycin (CLEOCIN) 300 mg capsule Take 1 Cap by mouth three (3) times daily for 10 days. Qty: 30 Cap, Refills: 0 Associated Diagnoses: Pressure injury of contiguous region involving right buttock and hip, unstageable ([address]  Pressure injury of contiguous region involving left buttock and hip, unstageable ([address] Pressure injury of sacral region, unstageable ([address] memantine (NAMENDA) 10 mg tablet Take 1 Tab by mouth daily. 1 tab every am 
Qty: 90 Tab, Refills: 5 MULTIVITAMIN PO Take 1 Tab by mouth daily. raNITIdine (ZANTAC) 150 mg tablet Take 1 Tab by mouth two (2) times a day. Qty: 180 Tab, Refills: 0 Comments: Please inform patient if she has ever had any recalled medications dispensed. Associated Diagnoses: Gastroesophageal reflux disease with esophagitis  
  
netarsudil mesylate (RHOPRESSA OP) Administer 1 Drop to both eyes daily. CALCIUM 600 600 mg calcium (1,500 mg) tablet take 1 tablet by mouth twice a day 
Qty: 60 Tab, Refills: 6 Associated Diagnoses: Medication refill  
  
dorzolamide (TRUSOPT) 2 % ophthalmic solution Administer 2 Drops to both eyes three (3) times daily. aspirin delayed-release 81 mg tablet take 1 tablet by mouth once daily 
Qty: 30 Tab, Refills: 6 Associated Diagnoses: Medication refill; Essential hypertension  
  
latanoprost (XALATAN) 0.005 % ophthalmic solution Administer 1 Drop to both eyes nightly. ferrous sulfate 324 mg (65 mg iron) tablet Take 324 mg by mouth daily. 1 tab before breakfast  
  
atorvastatin (LIPITOR) 80 mg tablet TAKE 1 TABLET EVERY DAY AT BEDTIME Qty: 90 Tab, Refills: 3 Associated Diagnoses: Hyperlipidemia, unspecified hyperlipidemia type  
  
lisinopril (PRINIVIL, ZESTRIL) 5 mg tablet TAKE 1 TABLET EVERY DAY Qty: 90 Tab, Refills: 1  
  
hydrocortisone (ALA-DEEDEE) 1 % lotion Apply  to affected area two (2) times a day. use thin layer Qty: 60 g, Refills: 2  
  
acetaminophen (TYLENOL EXTRA STRENGTH) 500 mg tablet Take 500 mg by mouth every six (6) hours as needed for Fever or Pain. 1 tab  
  
peg 400-propylene glycol (SYSTANE) 0.4-0.3 % drop Administer 1 Drop to left eye as needed for Other (PRN Dry eyes). PRN Dry eyes Current Facility-Administered Medications Medication Dose Route Frequency  dextrose 5 % - 0.45% NaCl infusion  75 mL/hr IntraVENous CONTINUOUS  
 sodium hypochlorite (QUARTER STRENGTH DAKIN'S) 0.125% irrigation (bottle)   Topical BID  insulin lispro (HUMALOG) injection   SubCUTAneous Q6H  
 cefepime (MAXIPIME) 0.5 g in 0.9% sodium chloride 100 mL IVPB  0.5 g IntraVENous Q24H  VANCOMYCIN INFORMATION NOTE   Other Rx Dosing/Monitoring  dorzolamide (TRUSOPT) 2 % ophthalmic solution 2 Drop  2 Drop Both Eyes TID  latanoprost (XALATAN) 0.005 % ophthalmic solution 1 Drop  1 Drop Both Eyes QHS  acetaminophen (TYLENOL) suppository 650 mg  650 mg Rectal Q6H PRN  
 ondansetron (ZOFRAN) injection 4 mg  4 mg IntraVENous Q6H PRN Allergies: Pcn [penicillins] Temp (24hrs), Av.3 °F (36.3 °C), Min:97 °F (36.1 °C), Max:97.8 °F (36.6 °C) Visit Vitals /64 Pulse 70 Temp 97.1 °F (36.2 °C) Resp 16 Wt 47.5 kg (104 lb 11.2 oz) SpO2 95% BMI 16.40 kg/m² ROS: Unable to obtain detailed ROS Physical Exam: 
 
Laying comfortably on bed. Exam- unchanged compared to prior exam.   
  
 
Labs: Results:  
Chemistry Recent Labs  
  20 
0520 20 
4254 20 
9178 GLU 95 89 265* * 147* 147* K 3.1* 3.8 3.8 * 115* 117* CO2 18* 16* 20* * 121* 121* CREA 2.47* 2.84* 3.10* CA 8.6 8.9 8.3* AGAP 11 16 10 BUCR 45* 43* 39* CBC w/Diff Recent Labs  
  20 
0520 WBC 33.8*  
RBC 2.39* HGB 7.1*  
HCT 20.2* PLT 34* GRANS 93* LYMPH 0*  
EOS 0 Microbiology No results for input(s): CULT in the last 72 hours. RADIOLOGY: 
 
All available imaging studies/reports in [facility] for this admission were reviewed Dr. Zara Webber, Infectious Disease Specialist 
[phone] 2020 
4:35 PM

## 2020-01-29 NOTE — PROGRESS NOTES
Home with [facility] tomorrow. DME to be delivered tonight. Have requested transport for 9am. 1/30/20. Dr. Coronado Null aware. Richard Justin, [phone]

## 2020-01-29 NOTE — PROGRESS NOTES
Per [employer] (CM) called [facility] at [phone] to schedule stretcher transport to patient's home ([address], [address]) for tomorrow (01/30/20) with a requested time of 9:00 am and requested provider [facility] with [facility] and received confirmation number [id number]. Per [facility], couldn't call lifecare. Dispatch will call the nurse's station with name of provider and ETA. Informed [employer] of transportation conversation and arrangements.

## 2020-01-29 NOTE — PROGRESS NOTES
NUTRITION Nursing Referral: Pressure Injury RECOMMENDATIONS / PLAN:  
 
- Evaluate ability to tolerate oral diet as medically appropriate, pending plan of care goals - Continue IV fluids. 
- Continue RD inpatient monitoring and evaluation. NUTRITION INTERVENTIONS & DIAGNOSIS:  
 
- Meals/snacks: NPO 
- IV fluids: D5 1/2 NS at 75 mL/hr (90 gm dextrose, 306 kcal/day) Nutrition Diagnosis: Inadequate oral intake related to altered mental status and swallowing difficulty as evidenced by clinically significant dehydration and unintentional weight loss of 22.3 % in the last month. Patient meets criteria for Severe Protein Calorie Malnutrition as evidenced by:  
ASPEN Malnutrition Criteria Acute Illness, Chronic Illness, or Social/Enviornmental: Chronic illness Weight Loss: Greater than 5% x 1 mo Body Fat Loss: Severe(orbital, thoracic body regions) Muscle Mass Loss: Severe(temporal, patellar/anterior thigh, clavicle body regions) ASPEN Malnutrition Score - Chronic Illness: 18 Chronic Illness - Malnutrition Diagnosis: Severe malnutrition. ASSESSMENT:  
 
1/29: D5 started and palliative meeting with daughter, interested in hospice. Per palliative daughter agrees pt would not want a feeding tube. Not appropriate for po trials at this time. Family declining surgical debridement of wounds. 1/28: Pt admitted with severe ROSLYN due to dehydration and sepsis. Pt is not verbal. Limited NFPE performed. Per chart review, pt's daughter reports decreased fluid and food intake for the last several days, significant weight loss noted. Multiple pressure injuries noted. Pt is currently NPO awaiting goals of care discussion with palliative. Hospice meeting with daughter on 1/29. Nutritional intake adequate to meet patients estimated nutritional needs:  No 
 
Diet: DIET NPO Food Allergies: NKFA Current Appetite: Not Applicable - NPO Appetite/meal intake prior to admission: Poor x several days, per daughter's report Feeding Limitations:  [x] Swallowing difficulty    [] Chewing difficulty    [x] Other: altered mental status Current Meal Intake: No data found. BM: 1/28 Skin Integrity: Multiple pressure injuries: unstageable pressure injuries to bilateral buttocks & sacrum; DTI to back Edema:   [] No     [x] Yes Pertinent Medications: SSI, ondansetron Recent Labs  
  01/29/20 
0520 01/28/20 
0514 01/27/20 
0815 * 147* 147* K 3.1* 3.8 3.8 * 115* 117* CO2 18* 16* 20* GLU 95 89 265* * 121* 121* CREA 2.47* 2.84* 3.10* CA 8.6 8.9 8.3*  
MG 2.2  --   -- Intake/Output Summary (Last 24 hours) at 1/29/2020 9602 Last data filed at 1/29/2020 0600 Gross per 24 hour Intake 613.75 ml Output 275 ml Net 338.75 ml Anthropometrics: 
Ht Readings from Last 1 Encounters:  
01/21/20 5' 7\" (1.702 m) Last 3 Recorded Weights in this Encounter 01/25/20 1737 01/28/20 0340 01/29/20 2151 Weight: 56.7 kg (125 lb) 47.5 kg (104 lb 12.8 oz) 47.5 kg (104 lb 11.2 oz) Body mass index is 16.4 kg/m². Underweight Weight History: -30 lbs, 22.3% Weight loss x 1.5 month per chart Weight Metrics 1/29/2020 1/21/2020 12/17/2019 12/17/2019 9/4/2019 6/24/2019 4/15/2019 Weight 104 lb 11.2 oz 128 lb 134 lb 134 lb 136 lb 136 lb -  
BMI 16.4 kg/m2 20.05 kg/m2 20.99 kg/m2 20.99 kg/m2 21.3 kg/m2 21.3 kg/m2 21.93 kg/m2 Admitting Diagnosis: Community acquired pneumonia [J18.9] Acute renal failure (ARF) (HCC) [N17.9] Severe dehydration [E86.0] Hypernatremia [E87.0] Leukocytosis [D72.829] Dementia ([address] [O05.93] Caloric malnutrition ([facility] Cinthia Mccarty Failure to thrive in adult [R62.7] Pertinent PMHx:  GERD, CAD, STEMI, breast cancer, DM, HTN, Cholecystectomy Education Needs:        [x] None identified  [] Identified - Not appropriate at this time  []  Identified and addressed - refer to education log Learning Limitations:   [] None identified  [x] Identified: dementia, nonverbal 
Cultural, Confucianist & ethnic food preferences:  [x] None identified    [] Identified and addressed ESTIMATED NUTRITION NEEDS:  
 
Calories: 5708-4751 kcal (35-40 kcal/kg) based on  [x] Actual BW: 47 kg      [] IBW Protein: 56-71 gm (1.2-1.5 gm/kg) based on  [x] Actual BW: 47 kg     [] IBW Fluid: 1 mL/kcal  
  
MONITORING & EVALUATION:  
 
Nutrition Goal(s):  
- Nutritional needs will be met through adequate oral intake or nutrition support within the next 5-7 days. Outcome: Not progressing towards goal 
- Provide nutrition intervention as appropriate with goals of care for the next 7 days. Outcome: Progressing towards goal   
 
Monitoring:   [x] Food and nutrient intake   [x] Food and nutrient administration  [x] Comparative standards   [x] Nutrition-focused physical findings   [x] Anthropometric Measurements   [x] Treatment/therapy   [x] Biochemical data, medical tests, and procedures Previous Recommendations (for follow-up assessments only): Implemented Discharge Planning: Nutritional discharge needs unknown at this time. Participated in care planning, discharge planning, & interdisciplinary rounds as appropriate. Can Sloan, RD Pager: [phone]

## 2020-01-29 NOTE — PROGRESS NOTES
Progress Note 80-year-old female with past medical history of severe dementia, diabetes, bedbound admitted for severe sepsis, following for renal failure. Subjective No improvement in mental status. IMPRESSION:  
Acute kidney injury, sepsis ATN, appears severe dehydrated Hypernatremia, from severe dehydration Metabolic acidosis, from severe renal failure Severe uremia Severe sepsis, source decubitus ulcer PLAN:  
· There is some improvement in her renal function but Plan for hospice noted which is appropriate in my opinion given her age and comorbidity, will be available if any question or concern. · Ordered K supplement. Change IV hydration rate to 50cc/hr,  Continue gentle hydration until further cleared goal of care. Continue antibiotic per ID colleague. Adjust medication per current renal function status.  
 
  
 
 
 
Facility-Administered Medications: None Current Facility-Administered Medications Medication Dose Route Frequency  vancomycin (VANCOCIN) 750 mg in  mL infusion  750 mg IntraVENous ONCE  
 dextrose 5 % - 0.45% NaCl infusion  75 mL/hr IntraVENous CONTINUOUS  
 sodium hypochlorite (QUARTER STRENGTH DAKIN'S) 0.125% irrigation (bottle)   Topical BID  insulin lispro (HUMALOG) injection   SubCUTAneous Q6H  
 cefepime (MAXIPIME) 0.5 g in 0.9% sodium chloride 100 mL IVPB  0.5 g IntraVENous Q24H  VANCOMYCIN INFORMATION NOTE   Other Rx Dosing/Monitoring  dorzolamide (TRUSOPT) 2 % ophthalmic solution 2 Drop  2 Drop Both Eyes TID  latanoprost (XALATAN) 0.005 % ophthalmic solution 1 Drop  1 Drop Both Eyes QHS  acetaminophen (TYLENOL) suppository 650 mg  650 mg Rectal Q6H PRN  
 ondansetron (ZOFRAN) injection 4 mg  4 mg IntraVENous Q6H PRN Review of Systems: As above Data Review: 
 
Labs: Results:  
   
Chemistry Recent Labs  
  01/29/20 
0520 01/28/20 
8518 01/27/20 
0815 GLU 95 89 265* * 147* 147* K 3.1* 3.8 3.8 * 115* 117* CO2 18* 16* 20* * 121* 121* CREA 2.47* 2.84* 3.10* CA 8.6 8.9 8.3* AGAP 11 16 10 BUCR 45* 43* 39* CBC w/Diff Recent Labs  
  01/29/20 
0520 WBC 33.8*  
RBC 2.39* HGB 7.1*  
HCT 20.2* PLT 34* GRANS 93* LYMPH 0*  
EOS 0 Coagulation No results for input(s): PTP, INR, APTT, INREXT, INREXT in the last 72 hours. Iron/Ferritin No results for input(s): IRON in the last 72 hours. No lab exists for component: TIBCCALC BNP No results for input(s): BNPP in the last 72 hours. Cardiac Enzymes No results for input(s): CPK, CKND1, LUIGI in the last 72 hours. No lab exists for component: Konrad Maroon Liver Enzymes No results for input(s): TP, ALB, TBIL, AP, SGOT, GPT in the last 72 hours. No lab exists for component: DBIL Thyroid Studies Lab Results Component Value Date/Time TSH 1.50 05/18/2019 10:27 PM  
    
 EKG: 
 
Physical Assessment:  
 
Visit Vitals /64 Pulse 70 Temp 97.1 °F (36.2 °C) Resp 16 Wt 47.5 kg (104 lb 11.2 oz) SpO2 95% BMI 16.40 kg/m² Weight change: -0.047 kg (-1.7 oz) Intake/Output Summary (Last 24 hours) at 1/29/2020 1039 Last data filed at 1/29/2020 0600 Gross per 24 hour Intake 613.75 ml Output 275 ml Net 338.75 ml Physical Exam:  
General: cachexia , no respi HEENT supple neck,  
CVS: S1S2 heard,  no rub RS: + air entry b/l, Abd: Soft, Non tender, Neuro: severe dementia Extrm: no edema,  
Skin: severe sacral decubitus Musculoskeletal: No gross joints or bone deformities Procedures/imaging: see electronic medical records for all procedures, Xrays and details which were not copied into this note but were reviewed prior to creation of Lisa Atkins MD 
January 29, 2020 [facility] [phone]

## 2020-01-29 NOTE — HOSPICE
.Pt/family pursuing hospice:yes Admission dx approved by Hospice Medical Director:Alzheimer's dementia. Hospice related comorbid conditions-hypernatremia, protein calorie malnutrition, aspiration pneumonia Anticipated hospital d/c date:1/30/2020 Discussion occurred with patient and/or family about preference of hospitalization once admitted to hospice: yes Reviewed and discussed hospice philosophy and keeping the patient comfortable in their residence: yes Narrative of events and/or assessment if applicable: Met with daughter Anneliese Sosa and caregiver. Discussed [facility] philosophy, services, criteria, and IDT. Answered all questions. Gave brochure with 24/7 contact information. Anneliese Sosa agreeable to home with hospice services. DME late today. Discussed with Dr. Vineet Killian and Ashlee Zimmerman. Thank you for the referral to [facility]. If we can be of further assistance please contact [phone]. JOSH SalinasN, RN Clinical Coordinator [place] [address] [place], [facility] [address] 
[phone] 
[email or web address]

## 2020-01-29 NOTE — PROGRESS NOTES
Bedside and Verbal shift change report given to [facility] (oncoming nurse) by Jose Mccormack RN (offgoing nurse). Report included the following information SBAR, Kardex, Intake/Output, MAR and Recent Results.

## 2020-01-29 NOTE — CONSULTS
Palliative Medicine Consult Patient Name: Tony Walker YOB: 1933 Date of Initial Consult: 1/28/2020, Follow up 1/29/2020 Reason for Consult: Goals of care discussions Requesting Provider: Kenna Rosas MD 
Primary Care Physician: DIAN Rogers 
  
 SUMMARY:  
Tony Walker is a 80 y.o. with a past history of coronary artery disease with history of an STEMI, history of right breast cancer status post radiation, diet-controlled diabetes, GERD, glaucoma, hypertension, and dementia, who was admitted on 1/25/2020 from home with a diagnosis of Multiple pressure ulcers POA with suspected polymicrobial soft tissue infection secondary to right hip wound, hypernatremia, ROSLYN, possible aspiration pneumonia, and unintentional weight loss. Current medical issues leading to Palliative Medicine involvement include: goals of care discussions for an 80year old with advanced dementia and multiple co-morbidities. 1/29/2020: Patient is asleep, arouses to tactile stimuli with grunts, no eye opening and no command following. Nonverbal. Daughter Armani White called us to bedside to complete POST form and also provided guardianship paperwork proving Armani White has guardianship on patient. Plan for home on hospice tomorrow AM. PALLIATIVE DIAGNOSES:  
1. Goals of care discussions 2. Pressure ulcers 3. Dementia 4. Severe protein calorie malnutrition 5. Debility PLAN:  
1/29/2020: Palliative medicine team including AMRIT Guerra RN and I met with patient at patient's bedside. Patient is asleep, arouses to tactile stimuli with grunts, no eye opening and no command following. Nonverbal. Daughter Armani White at bedside and provided us with guardianship paperwork proving Armani White has guardianship on patient. She and attending physician have also spoken to patient's son Ben Rodríguez who agrees with DNR/DNI, and hospice care.  Thorough goals of care discussion yesterday (see previous visit below) and daughter desired DNR/DNI, and hospice referral. Uriel Jimenez met with hospice liaison today and accepted hospice at home, with anticipated discharge date tomorrow 1/30/2020. Discussed the benefits and burdens of continuing current level of care while here in the hospital versus implementing comfort measures now, Uriel Jimenez wishes to keep current level of care (IV fluids, antibiotics) with no escalation of care, no pressors, no intubation until discharged from hospital to hospice care. Uriel Jimenez is aware that these measures will stop once patient discharges home with hospice. POST form introduced and signed by Lola Rosario with the following measures: DNR/DNI, limited interventions, NO feeding tubes. Support offered to Uriel Jimenez. Will continue to follow for support. See previous visits below: 
 
1/28/2020 1. Goals of care discussions: I met with patient at patient's bedside. Patient is asleep, arouses to tactile stimuli with grunts, no eye opening and no command following. Nonverbal. Daughter Lola Rosario at bedside, and grateful for the visit. Discussed our role as palliative medicine and support offered as Uriel Jimenez shares information about patient's sudden decline over the past two weeks. Per Uriel Jimenez, patient was w/c bound with assist two weeks ago, and now completely bed-bound and not eating/talking. Patient has no AMD on file, not , and has a total of 4 living children. Per Uriel Jimenez, 2 of the patient's children have been estranged for years and other family has no way of contacting them. Decision making will default to the consensus of patient's 2 available children Lola Rosario and 4Blox.  Uriel Jimenez states she and [facility] are interested in hospice, as she does not want patient in the hospital long and does not want any surgical procedures should she need wound debridement, etc. Discussed the benefits and burdens of continued aggressive therapy versus comfort care/hospice. Hospice consult placed. Discussed the benefits and burdens of CPR in the event of cardiac or pulmonary arrest. Mirza Antunezdaniel realizes now that CPR would not be desired by patient, but we will need to discuss with patient's other son Baylee Ley prior to legally changing code status. Continue current level of care at this time: Full code with full interventions. Mirza Bates to call our team with a good time tomorrow to meet with her in person and Jim over telephone (because he lives in [place]) to confirm goals of care. 1. Pressure ulcers: Multiple of sacral/hip areas POA per chart review. Suspected polymicrobial soft tissue infection secondary to right hip wound. On IVAB. Family does not want surgical interventions If needed. 2. Dementia: Baseline confused and w/c bound with assist transferring. FAST score 7F upon my assessment. 3. Severe protein calorie malnutrition: Body mass index is 16.4 kg/m². Not able to eat due to condition (asleep, arouses to tactile stimuli with grunts, no eye opening and no command following. Nonverbal). Discussed the benefits and burdens of feeding tubes with Mirza Bates who agrees patient would not want/benefit from feeding tubes. Last Albumin 1.8 on 1/25/2020. 
4. Debility: Prior to two week ago, patient was w/c bound, followed simple commands, and lived with daughter Mirza Bates and had caregiver (neighbor) 7 days a week as needed. Completely bedbound now, no command following. 5. Initial consult note routed to primary continuity provider 6. Communicated plan of care with: Palliative IDT, patient's family, attending GOALS OF CARE / TREATMENT PREFERENCES:  
[====Goals of Care====] GOALS OF CARE: DNR/DNI, limited interventions, NO feeding tubes. D/c home with hospice Patient/Health Care Proxy Stated Goals: Comfort TREATMENT PREFERENCES:  
Code Status: DNR Advance Care Planning: 
Advance Care Planning 1/29/2020 Patient's Healthcare Decision Maker is: Legal Next of Kin Primary Decision Maker Name -  
Primary Decision Maker Phone Number -  
Primary Decision Maker Relationship to Patient -  
Confirm Advance Directive None Patient Would Like to Complete Advance Directive Unable Does the patient have other document types Guardianship;MOST/MOLST/POST/POLST Medical Interventions: Comfort measures Artificially Administered Nutrition: No feeding tube The palliative care team has discussed with patient / health care proxy about goals of care / treatment preferences for patient. 
[====Goals of Care====] HISTORY:  
 
History obtained from: patient's chart, family CHIEF COMPLAINT: n/a 
 
HPI/SUBJECTIVE: The patient is:  
[] Verbal and participatory [x] Non-participatory due to:  
asleep, arouses to tactile stimuli with grunts, no eye opening and no command following. Nonverbal 
 
Clinical Pain Assessment (nonverbal scale for severity on nonverbal patients): Adult Nonverbal Pain Scale Face: No particular expression or smile Activity (Movement): Lying quietly, normal position Guarding: Lying quietly, no positioning of hands over areas of body Physiology (Vital Signs): Stable vital signs Respiratory: Baseline RR/SpO2 compliant with ventilator Total Score: 0 
 
 
 FUNCTIONAL ASSESSMENT:  
 
Palliative Performance Scale (PPS): PPS: 10 PSYCHOSOCIAL/SPIRITUAL SCREENING:  
 
Advance Care Planning: 
Advance Care Planning 1/29/2020 Patient's Healthcare Decision Maker is: Legal Next of Kin Primary Decision Maker Name -  
Primary Decision Maker Phone Number -  
Primary Decision Maker Relationship to Patient -  
Confirm Advance Directive None Patient Would Like to Complete Advance Directive Unable Does the patient have other document types Guardianship;MOST/MOLST/POST/POLST Any spiritual / Adventist concerns: 
[] Yes /  [x] No 
 
Caregiver Burnout: 
 [] Yes /  [x] No /  [] No Caregiver Present Anticipatory grief assessment:  
[x] Normal  / [] Maladaptive REVIEW OF SYSTEMS:  
 
Positive and pertinent negative findings in ROS are noted above in HPI. The following systems were [] reviewed / [x] unable to be reviewed as noted in HPI Other findings are noted below. Systems: constitutional, ears/nose/mouth/throat, respiratory, gastrointestinal, genitourinary, musculoskeletal, integumentary, neurologic, psychiatric, endocrine. Positive findings noted below. Modified ESAS Completed by: provider Stool Occurrence(s): 1 PHYSICAL EXAM:  
 
From RN flowsheet: 
Wt Readings from Last 3 Encounters:  
01/29/20 47.5 kg (104 lb 11.2 oz) 01/21/20 58.1 kg (128 lb) 12/17/19 60.8 kg (134 lb) Blood pressure 118/60, pulse 67, temperature 95.6 °F (35.3 °C), resp. rate 16, weight 47.5 kg (104 lb 11.2 oz), SpO2 95 %. Pain Scale 1: Adult Nonverbal Pain Scale Pain Intensity 1: 0 Constitutional: asleep, arouses to tactile stimuli with grunts, no eye opening and no command following. Nonverbal 
Eyes: closed ENMT: no nasal discharge, dry mucous membranes Cardiovascular: regular rhythm, distal pulses intact Respiratory: breathing not labored, symmetric Gastrointestinal: soft non-tender, +bowel sounds Musculoskeletal: no deformity, no tenderness to palpation Skin: warm, dry Neurologic: not following commands HISTORY:  
 
Active Problems: Hypernatremia (1/25/2020) Leukocytosis (1/25/2020) Community acquired pneumonia (1/25/2020) Dementia ([address] (1/25/2020) Severe dehydration (1/25/2020) Failure to thrive in adult (1/25/2020) Acute renal failure (ARF) ([address] (1/25/2020) Caloric malnutrition ([address] (1/25/2020) Past Medical History:  
Diagnosis Date  ACS (acute coronary syndrome) ([address] NSTEMI  Arthritis  Breast cancer ([address] 2007 right breast (radiation)  D[facility]  Diabetes ([facility] diet controlled  GERD (gastroesophageal reflux disease)  Glaucoma  Hypertension Past Surgical History:  
Procedure Laterality Date  HX APPENDECTOMY  HX BREAST BIOPSY  HX CHOLECYSTECTOMY Family History Problem Relation Age of Onset  Cancer Mother  Cancer Father  Diabetes Sister  Diabetes Paternal Grandmother  Cancer Other  Breast Cancer Daughter History reviewed, no pertinent family history. Social History Tobacco Use  Smoking status: Never Smoker  Smokeless tobacco: Never Used Substance Use Topics  Alcohol use: No  
 
Allergies Allergen Reactions  Pcn [Penicillins] Other (comments) Unknown-happened when she was very young Current Facility-Administered Medications Medication Dose Route Frequency  dextrose 5 % - 0.45% NaCl infusion  50 mL/hr IntraVENous CONTINUOUS  
 sodium hypochlorite (QUARTER STRENGTH DAKIN'S) 0.125% irrigation (bottle)   Topical BID  insulin lispro (HUMALOG) injection   SubCUTAneous Q6H  
 cefepime (MAXIPIME) 0.5 g in 0.9% sodium chloride 100 mL IVPB  0.5 g IntraVENous Q24H  VANCOMYCIN INFORMATION NOTE   Other Rx Dosing/Monitoring  dorzolamide (TRUSOPT) 2 % ophthalmic solution 2 Drop  2 Drop Both Eyes TID  latanoprost (XALATAN) 0.005 % ophthalmic solution 1 Drop  1 Drop Both Eyes QHS  acetaminophen (TYLENOL) suppository 650 mg  650 mg Rectal Q6H PRN  
 ondansetron (ZOFRAN) injection 4 mg  4 mg IntraVENous Q6H PRN  
 
 
 
 LAB AND IMAGING FINDINGS:  
 
Lab Results Component Value Date/Time WBC 33.8 (H) 01/29/2020 05:20 AM  
 HGB 7.1 (L) 01/29/2020 05:20 AM  
 PLATELET 34 (L) 85/42/8773 05:20 AM  
 
Lab Results Component Value Date/Time  Sodium 147 (H) 01/29/2020 05:20 AM  
 Potassium 3.1 (L) 01/29/2020 05:20 AM  
 Chloride 118 (H) 01/29/2020 05:20 AM  
 CO2 18 (L) 01/29/2020 05:20 AM  
  (H) 01/29/2020 05:20 AM  
 Creatinine 2.47 (H) 01/29/2020 05:20 AM  
 Calcium 8.6 01/29/2020 05:20 AM  
 Magnesium 2.2 01/29/2020 05:20 AM  
 Phosphorus 3.7 01/26/2020 04:31 AM  
  
Lab Results Component Value Date/Time AST (SGOT) 31 01/25/2020 06:30 PM  
 Alk. phosphatase 111 01/25/2020 06:30 PM  
 Protein, total 7.1 01/25/2020 06:30 PM  
 Albumin 1.8 (L) 01/25/2020 06:30 PM  
 Globulin 5.3 (H) 01/25/2020 06:30 PM  
 
Lab Results Component Value Date/Time INR 1.0 10/30/2017 01:35 PM  
 Prothrombin time 13.0 10/30/2017 01:35 PM  
  
No results found for: IRON, FE, TIBC, IBCT, PSAT, FERR No results found for: PH, PCO2, PO2 No components found for: [place] Lab Results Component Value Date/Time  (H) 05/18/2019 10:27 PM  
 CK - MB 9.6 (H) 05/18/2019 10:27 PM  
  
 
 
   
 
Total time: 25 minutes Counseling / coordination time, spent as noted above: 20 minutes 
> 50% counseling / coordination?: yes, patient, family, attending Prolonged service was provided for  []30 min   []75 min in face to face time in the presence of the patient, spent as noted above. Time Start:  
Time End:  
Note: this can only be billed with 61939 (initial) or 47509 (follow up). If multiple start / stop times, list each separately.

## 2020-01-29 NOTE — PROGRESS NOTES
[facility] Hospitalist Group Progress Note Patient: Nandini Quispe Age: 80 y.o. : 2/3/1933 MR#: [id number] SSN: [id number] Date: 2020 Subjective/24-hour events:  
 
Patient lying in bed , remains poorly responsive Assessment:  
Multiple pressure ulcers POA with suspected polymicrobial soft tissue infection secondary to right hip wound Hypernatremia is likely secondary to dehydration/volume depletion Acute kidney injury Possible aspiration pneumonia Unintentional weight loss Suspected severe protein calorie malnutrition Functional quadriplegia/bedbound at baseline Dementia Advanced age Sepsis POA Plan: On abx Plans for home hospice noted D/w palliative care D/w  Case discussed with:  []Patient  []Family  [x]Nursing  []Case Management DVT Prophylaxis:  []Lovenox  []Hep SQ  [x]SCDs  []Coumadin   []On Heparin gtt Objective:  
VS:  
Visit Vitals /60 Pulse 75 Temp 96.2 °F (35.7 °C) Resp 16 Wt 47.5 kg (104 lb 11.2 oz) SpO2 100% BMI 16.40 kg/m² Tmax/24hrs: Temp (24hrs), Av.9 °F (36.1 °C), Min:95.6 °F (35.3 °C), Max:97.8 °F (36.6 °C) Intake/Output Summary (Last 24 hours) at 2020 1705 Last data filed at 2020 1446 Gross per 24 hour Intake 613.75 ml Output 425 ml Net 188.75 ml General:  Poorly responsive Cardiovascular:  S1S2+, RRR Pulmonary:  CTA b/l GI:  Soft, BS+, NT, ND Extremities:  No edema Multiple decubitii Labs:   
Recent Results (from the past 24 hour(s)) GLUCOSE, POC Collection Time: 20  5:39 PM  
Result Value Ref Range Glucose (POC) 100 70 - 110 mg/dL GLUCOSE, POC Collection Time: 20 12:05 AM  
Result Value Ref Range Glucose (POC) 116 (H) 70 - 110 mg/dL METABOLIC PANEL, BASIC Collection Time: 20  5:20 AM  
Result Value Ref Range Sodium 147 (H) 136 - 145 mmol/L  Potassium 3.1 (L) 3.5 - 5.5 mmol/L  
 Chloride 118 (H) 100 - 111 mmol/L  
 CO2 18 (L) 21 - 32 mmol/L Anion gap 11 3.0 - 18 mmol/L Glucose 95 74 - 99 mg/dL  (H) 7.0 - 18 MG/DL Creatinine 2.47 (H) 0.6 - 1.3 MG/DL  
 BUN/Creatinine ratio 45 (H) 12 - 20 GFR est AA 22 (L) >60 ml/min/1.73m2 GFR est non-AA 19 (L) >60 ml/min/1.73m2 Calcium 8.6 8.5 - 10.1 MG/DL [facility] Collection Time: 01/29/20  5:20 AM  
Result Value Ref Range Vancomycin, random 20.7 5.0 - 40.0 UG/ML  
CBC WITH AUTOMATED DIFF Collection Time: 01/29/20  5:20 AM  
Result Value Ref Range WBC 33.8 (H) 4.6 - 13.2 K/uL  
 RBC 2.39 (L) 4.20 - 5.30 M/uL HGB 7.1 (L) 12.0 - 16.0 g/dL HCT 20.2 (L) 35.0 - 45.0 % MCV 84.5 74.0 - 97.0 FL  
 MCH 29.7 24.0 - 34.0 PG  
 MCHC 35.1 31.0 - 37.0 g/dL  
 RDW 14.5 11.6 - 14.5 % PLATELET 34 (L) 730 - 420 K/uL NEUTROPHILS 93 (H) 42 - 75 % BAND NEUTROPHILS 6 (H) 0 - 5 % LYMPHOCYTES 0 (L) 20 - 51 % MONOCYTES 1 (L) 2 - 9 % EOSINOPHILS 0 0 - 5 % BASOPHILS 0 0 - 3 %  
 ABS. NEUTROPHILS 33.5 (H) 1.8 - 8.0 K/UL  
 ABS. LYMPHOCYTES 0.0 (L) 0.8 - 3.5 K/UL  
 ABS. MONOCYTES 0.3 0 - 1.0 K/UL  
 ABS. EOSINOPHILS 0.0 0.0 - 0.4 K/UL  
 ABS. BASOPHILS 0.0 0.0 - 0.06 K/UL  
 DF MANUAL PLATELET COMMENTS DECREASED PLATELETS    
 RBC COMMENTS NORMOCYTIC, NORMOCHROMIC    
 RBC COMMENTS POIKILOCYTOSIS 2+ 
    
 RBC COMMENTS TARGET CELLS 1+ RBC COMMENTS OVALOCYTES 1+ RBC COMMENTS ACANTHOCYTES 1+ WBC COMMENTS TOXIC GRANULATION    
MAGNESIUM Collection Time: 01/29/20  5:20 AM  
Result Value Ref Range Magnesium 2.2 1.6 - 2.6 mg/dL GLUCOSE, POC Collection Time: 01/29/20  6:22 AM  
Result Value Ref Range Glucose (POC) 140 (H) 70 - 110 mg/dL GLUCOSE, POC Collection Time: 01/29/20 12:14 PM  
Result Value Ref Range Glucose (POC) 166 (H) 70 - 110 mg/dL Signed By: Ra Cerna MD   
 January 29, 2020

## 2020-01-29 NOTE — ACP (ADVANCE CARE PLANNING)
Palliative Medicine Pt does not have an Advance Directive on file in EMR. She is unable to complete one d/t mental status (unresponsive, advanced dementia). Her daughter Olivia Webb is her court appointed guardian and brought in that paperwork. One copy was placed on the chart for scanning into EMR. Primary Decision Maker [facility] Agent): Olivia Webb Relationship to patient: Adult daughter Phone number: [phone] [x] Named in a scanned document  
[x] Legal Next of Kin [x] Guardian ACP documents you currently have include: 
[] Advance Directive or Living Will 
[] Durable Do Not Resuscitate [x] Physician Orders for Scope of Treatment (POST) [] Medical Power of  
[x] Other - Guardianship POST form was completed today by the pt's legal NOK/guardian/adult daughter Olivia Webb and by Krishna Herrera - Palliative Medicine NP. POST form denotes DDNR status, limited medical interventions (NO intubation under any circumstances, ok with CPAP/BiPAP, avoid ICU if possible) and NO feeding tube. One copy was placed on the chart for scanning into EMR. 3 additional copies were given to the pt's daughter Chad Villa. Pt remains DNR/DNI. Potential dispo plan is home with hospice support. Thank you for the Palliative Medicine consult and allowing us to participate in the care of Mrs. Tong Morales. Will continue to monitor and provide support. Jeanmarie Rubio, BSN Palliative Medicine Inpatient RN [facility] Palliative COPE Line: [phone])

## 2020-01-30 NOTE — PROGRESS NOTES
0700 Received BSSR from RHETT Guardado using SBAR MAR I&O, and kardex. Whiteboard updated, callbell within reach. No PIV. in place. 0900 Dressings changed on wounds and incontinent care completed. Eliel to take pt home via stretcher, with wang in place. Scripts and d/c instructions left with pt.

## 2020-01-30 NOTE — DISCHARGE INSTRUCTIONS
Patient Education        Learning About Hospice and Palliative Care  What are hospice and palliative care? Palliative (say \"PAL-zamzam-uh-tiv\") care is an area of medicine that helps give you more good days by providing care for quality-of-life issues. It includes treating symptoms like pain, nausea, or sleep problems. It can also include helping you and your loved ones to:  · Understand your illness better. · Talk more openly about your feelings. · Decide what treatments you want or don't want. · Communicate better with your doctors, nurses, and each other. Hospice care is a type of palliative care. But it's for people who are near the end of life. What kinds of care are involved? Palliative care: This treatment helps you feel better physically, emotionally, and spiritually while doctors also treat your illness. Your care may include pain relief, counseling, or nutrition advice. Hospice care: Again, the goal of this type of care is to help you feel better. And it can help you get the most out of the time you have left. But you no longer get treatment to try to cure your illness. When does care happen? Palliative care: This care can happen at any time during a serious illness. You don't have to be near death to get this care. Hospice care: In most cases, you can choose hospice care when your doctor believes that you have no more than about 6 months to live. Where does the care happen? Palliative care: This care often happens in hospitals or long-term care facilities like nursing homes. It can take place wherever you are treated, even in your home. Hospice care: Most hospice care is done in the place the patient calls \"home. \" This is often the person's home. But it could also be a place like a nursing home or shelter center. Hospice care may also be given in hospice centers, hospitals, and other places. Who provides the care? Palliative care:  There are doctors and nurses who specialize in this field. But your own doctor may also give some of this care. And there are many other experts who may help you. These include social workers, counselors, therapists, and nutrition experts. Hospice care: In hospitals, hospice centers, and other facilities, care is given by doctors, nurses, and others who are trained in hospice care. In the home, a family member is often the main caregiver. But the family member gets help from care experts. They are on call 24 hours a day. Where can you learn more? Go to http://michell-everardo.info/. Enter 466 [id number] in the search box to learn more about \"Learning About Hospice and Palliative Care. \"  Current as of: April 1, 2019  Content Version: 12.2  © 0078-6817 [employer]. Care instructions adapted under license by [employer] (which disclaims liability or warranty for this information). If you have questions about a medical condition or this instruction, always ask your healthcare professional. [place] [address] any warranty or liability for your use of this information. Patient Education        Necrotizing Fasciitis: Care Instructions  Your Care Instructions    Necrotizing fasciitis is a rare infection that kills skin, fat, and muscles. It is also called \"flesh-eating\" bacteria. It usually affects the legs and arms. It can cause scarring and can lead to amputation and death. This condition is treated in a hospital. Treatment includes antibiotics and supportive care. Surgery is usually needed to remove dead or infected tissue, stop the spread of infection, and repair damage. Sometimes people are placed in a chamber with high levels of oxygen. This is called a hyperbaric chamber. It helps the tissue heal.  Follow-up care is a key part of your treatment and safety. Be sure to make and go to all appointments, and call your doctor if you are having problems.  It's also a good idea to know your test results and keep a list of the medicines you take. How can you care for yourself at home? · Take your medicine exactly as prescribed. Call your doctor if you think you are having a problem with your medicine. · If your doctor prescribed antibiotics, take them as directed. Do not stop taking them just because you feel better. You need to take the full course of antibiotics. · Most people who get this condition are in good health before they get infected. You can lower your risk of infection by giving proper care to skin wounds. ? Keep all wounds clean. This includes cuts, burns, sores, and bites. ? If you strain a muscle or sprain a joint and get a fever, chills, and severe pain, seek medical care right away. These may be signs of deep soft tissue infection. ? If you have severe pain and swelling and a fever, do not treat these with nonsteroidal anti-inflammatory drugs (NSAIDs), such as ibuprofen. These medicines may keep you from seeing a doctor quickly when you really need to. When should you call for help? Call your doctor now or seek immediate medical care if:    · You have worse symptoms of infection, such as:  ? Increased pain, swelling, warmth, or redness. ? Red streaks leading from the area. ? Pus draining from the area. ? A fever.    Watch closely for changes in your health, and be sure to contact your doctor if:    · You do not get better as expected. Where can you learn more? Go to http://micehll-everardo.info/. Enter 193 [id number] in the search box to learn more about \"Necrotizing Fasciitis: Care Instructions. \"  Current as of: June 9, 2019  Content Version: 12.2  © 6919-6373 [employer]. Care instructions adapted under license by [employer] (which disclaims liability or warranty for this information).  If you have questions about a medical condition or this instruction, always ask your healthcare professional. [place] [address] any warranty or liability for your use of this information.

## 2020-01-30 NOTE — CONSULTS
Palliative Medicine Consult Patient Name: Migue Bejarano YOB: 1933 Date of Initial Consult: 1/28/2020, Follow up 1/29/2020, Follow up 1/30/2020 Reason for Consult: Goals of care discussions Requesting Provider: Leia Velasquez MD 
Primary Care Physician: DIAN Hudson 
  
 SUMMARY:  
Migue Bejarano is a 80 y.o. with a past history of coronary artery disease with history of an STEMI, history of right breast cancer status post radiation, diet-controlled diabetes, GERD, glaucoma, hypertension, and dementia, who was admitted on 1/25/2020 from home with a diagnosis of Multiple pressure ulcers POA with suspected polymicrobial soft tissue infection secondary to right hip wound, hypernatremia, ROSLYN, possible aspiration pneumonia, and unintentional weight loss. Current medical issues leading to Palliative Medicine involvement include: goals of care discussions for an 80year old with advanced dementia and multiple co-morbidities. 1/29/2020: Patient is asleep, arouses to tactile stimuli with grunts, no eye opening and no command following. Nonverbal. Daughter Efraín Oro called us to bedside to complete POST form and also provided guardianship paperwork proving Efraín Oro has guardianship on patient. Plan for home on hospice tomorrow AM. 
 
1/30/2020: Patient is asleep, arouses to tactile stimuli with grunts, no eye opening and no command following. Nonverbal. Repositioned in bed for comfort. No family at bedside. Plan to d/c home with hospice this AM. PALLIATIVE DIAGNOSES:  
1. Goals of care discussions 2. Pressure ulcers 3. Dementia 4. Severe protein calorie malnutrition 5. Debility PLAN:  
1/30/2020: Patient is asleep, arouses to tactile stimuli with grunts, no eye opening and no command following. Nonverbal. Repositioned in bed for comfort. No family at bedside.  Plan to d/c home with hospice this AM. Zayda Mead wishes to keep current level of care (IV fluids, antibiotics) with no escalation of care, no pressors, no intubation and discharge from hospital to home with hospice care. Zayda Mead is aware that these treatment measures will stop once patient discharges home with hospice. POST form on file with the following measures: DNR/DNI, limited interventions, NO feeding tubes. Will continue to follow for support. See previous visits below: 
 
1/29/2020: Palliative medicine team including AMRIT Jean RN and I met with patient at patient's bedside. Patient is asleep, arouses to tactile stimuli with grunts, no eye opening and no command following. Nonverbal. Daughter Nely Evans at bedside and provided us with guardianship paperwork proving Nely Evans has guardianship on patient. She and attending physician have also spoken to patient's son Charly Lyons who agrees with DNR/DNI, and hospice care. Thorough goals of care discussion yesterday (see previous visit below) and daughter desired DNR/DNI, and hospice referral. Zayda Mead met with hospice liaison today and accepted hospice at home, with anticipated discharge date tomorrow 1/30/2020. Discussed the benefits and burdens of continuing current level of care while here in the hospital versus implementing comfort measures now, Zayda Mead wishes to keep current level of care (IV fluids, antibiotics) with no escalation of care, no pressors, no intubation until discharged from hospital to hospice care. Zayda Mead is aware that these measures will stop once patient discharges home with hospice. POST form introduced and signed by Nely Cristina with the following measures: DNR/DNI, limited interventions, NO feeding tubes. Support offered to Zayda Mead. Will continue to follow for support. 1/28/2020 1. Goals of care discussions: I met with patient at patient's bedside.  Patient is asleep, arouses to tactile stimuli with grunts, no eye opening and no command following. Nonverbal. Daughter Nely Evans at bedside, and grateful for the visit. Discussed our role as palliative medicine and support offered as Zayda Mead shares information about patient's sudden decline over the past two weeks. Per Zayda Mead, patient was w/c bound with assist two weeks ago, and now completely bed-bound and not eating/talking. Patient has no AMD on file, not , and has a total of 4 living children. Per Zayda Mead, 2 of the patient's children have been estranged for years and other family has no way of contacting them. Decision making will default to the consensus of patient's 2 available children Nely Evans and ISBX. Zayda Mead states she and Charly Lyons are interested in hospice, as she does not want patient in the hospital long and does not want any surgical procedures should she need wound debridement, etc. Discussed the benefits and burdens of continued aggressive therapy versus comfort care/hospice. Hospice consult placed. Discussed the benefits and burdens of CPR in the event of cardiac or pulmonary arrest. Zayda Mead realizes now that CPR would not be desired by patient, but we will need to discuss with patient's other son ISBX prior to legally changing code status. Continue current level of care at this time: Full code with full interventions. Zayda Mead to call our team with a good time tomorrow to meet with her in person and Jim over telephone (because he lives in [place]) to confirm goals of care. 1. Pressure ulcers: Multiple of sacral/hip areas POA per chart review. Suspected polymicrobial soft tissue infection secondary to right hip wound. On IVAB. Family does not want surgical interventions If needed. 2. Dementia: Baseline confused and w/c bound with assist transferring. FAST score 7F upon my assessment. 3. Severe protein calorie malnutrition: Body mass index is 16.4 kg/m².  Not able to eat due to condition (asleep, arouses to tactile stimuli with grunts, no eye opening and no command following. Nonverbal). Discussed the benefits and burdens of feeding tubes with Mirza Bates who agrees patient would not want/benefit from feeding tubes. Last Albumin 1.8 on 1/25/2020. 
4. Debility: Prior to two week ago, patient was w/c bound, followed simple commands, and lived with daughter Mirza Bates and had caregiver (neighbor) 7 days a week as needed. Completely bedbound now, no command following. 5. Initial consult note routed to primary continuity provider 6. Communicated plan of care with: Palliative IDT, patient's family, attending GOALS OF CARE / TREATMENT PREFERENCES:  
[====Goals of Care====] GOALS OF CARE: DNR/DNI, limited interventions, NO feeding tubes. D/c home with hospice Patient/Health Care Proxy Stated Goals: Comfort TREATMENT PREFERENCES:  
Code Status: DNR Advance Care Planning: 
Advance Care Planning 1/29/2020 Patient's Healthcare Decision Maker is: Legal Next of Kin Primary Decision Maker Name -  
Primary Decision Maker Phone Number -  
Primary Decision Maker Relationship to Patient -  
Confirm Advance Directive None Patient Would Like to Complete Advance Directive Unable Does the patient have other document types Guardianship;MOST/MOLST/POST/POLST Medical Interventions: Limited additional interventions Artificially Administered Nutrition: No feeding tube The palliative care team has discussed with patient / health care proxy about goals of care / treatment preferences for patient. 
[====Goals of Care====] HISTORY:  
 
History obtained from: patient's chart, family CHIEF COMPLAINT: n/a 
 
HPI/SUBJECTIVE: The patient is:  
[] Verbal and participatory [x] Non-participatory due to:  
asleep, arouses to tactile stimuli with grunts, no eye opening and no command following. Nonverbal 
 
Clinical Pain Assessment (nonverbal scale for severity on nonverbal patients): Adult Nonverbal Pain Scale Face: No particular expression or smile Activity (Movement): Lying quietly, normal position Guarding: Lying quietly, no positioning of hands over areas of body Physiology (Vital Signs): Stable vital signs Respiratory: Baseline RR/SpO2 compliant with ventilator Total Score: 0 
 
 
 FUNCTIONAL ASSESSMENT:  
 
Palliative Performance Scale (PPS): PPS: 10 PSYCHOSOCIAL/SPIRITUAL SCREENING:  
 
Advance Care Planning: 
Advance Care Planning 1/29/2020 Patient's Healthcare Decision Maker is: Legal Next of Kin Primary Decision Maker Name -  
Primary Decision Maker Phone Number -  
Primary Decision Maker Relationship to Patient -  
Confirm Advance Directive None Patient Would Like to Complete Advance Directive Unable Does the patient have other document types Guardianship;MOST/MOLST/POST/POLST Any spiritual / Holiness concerns: 
[] Yes /  [x] No 
 
Caregiver Burnout: 
[] Yes /  [x] No /  [] No Caregiver Present Anticipatory grief assessment:  
[x] Normal  / [] Maladaptive REVIEW OF SYSTEMS:  
 
Positive and pertinent negative findings in ROS are noted above in HPI. The following systems were [] reviewed / [x] unable to be reviewed as noted in HPI Other findings are noted below. Systems: constitutional, ears/nose/mouth/throat, respiratory, gastrointestinal, genitourinary, musculoskeletal, integumentary, neurologic, psychiatric, endocrine. Positive findings noted below. Modified ESAS Completed by: provider Stool Occurrence(s): 1 PHYSICAL EXAM:  
 
From RN flowsheet: 
Wt Readings from Last 3 Encounters:  
01/29/20 47.5 kg (104 lb 11.2 oz) 01/21/20 58.1 kg (128 lb) 12/17/19 60.8 kg (134 lb) Blood pressure 111/59, pulse 79, temperature 96.2 °F (35.7 °C), resp. rate 12, weight 47.5 kg (104 lb 11.2 oz), SpO2 98 %. Pain Scale 1: Adult Nonverbal Pain Scale Pain Intensity 1: 0 
  
  
  
  
  
 
 
 Constitutional: asleep, arouses to tactile stimuli with grunts, no eye opening and no command following. Nonverbal 
Eyes: closed ENMT: no nasal discharge, dry mucous membranes Cardiovascular: regular rhythm, distal pulses intact Respiratory: breathing not labored, symmetric Gastrointestinal: soft non-tender, +bowel sounds Musculoskeletal: no deformity, no tenderness to palpation Skin: warm, dry Neurologic: not following commands HISTORY:  
 
Active Problems: Hypernatremia (1/25/2020) Leukocytosis (1/25/2020) Community acquired pneumonia (1/25/2020) Dementia ([address] (1/25/2020) Severe dehydration (1/25/2020) Failure to thrive in adult (1/25/2020) Acute renal failure (ARF) ([address] (1/25/2020) Caloric malnutrition ([address] (1/25/2020) Past Medical History:  
Diagnosis Date  ACS (acute coronary syndrome) ([facility] NSTEMI  Arthritis  Breast cancer [facility]) 2007  
 right breast (radiation)  Dementia ([address]  Diabetes ([address] diet controlled  GERD (gastroesophageal reflux disease)  Glaucoma  Hypertension Past Surgical History:  
Procedure Laterality Date  HX APPENDECTOMY  HX BREAST BIOPSY  HX CHOLECYSTECTOMY Family History Problem Relation Age of Onset  Cancer Mother  Cancer Father  Diabetes Sister  Diabetes Paternal Grandmother  Cancer Other  Breast Cancer Daughter History reviewed, no pertinent family history. Social History Tobacco Use  Smoking status: Never Smoker  Smokeless tobacco: Never Used Substance Use Topics  Alcohol use: No  
 
Allergies Allergen Reactions  Pcn [Penicillins] Other (comments) Unknown-happened when she was very young Current Facility-Administered Medications Medication Dose Route Frequency  dextrose 5 % - 0.45% NaCl infusion  50 mL/hr IntraVENous CONTINUOUS  
 sodium hypochlorite (QUARTER STRENGTH DAKIN'S) 0.125% irrigation (bottle)   Topical BID  insulin lispro (HUMALOG) injection   SubCUTAneous Q6H  
 dorzolamide (TRUSOPT) 2 % ophthalmic solution 2 Drop  2 Drop Both Eyes TID  latanoprost (XALATAN) 0.005 % ophthalmic solution 1 Drop  1 Drop Both Eyes QHS  acetaminophen (TYLENOL) suppository 650 mg  650 mg Rectal Q6H PRN  
 ondansetron (ZOFRAN) injection 4 mg  4 mg IntraVENous Q6H PRN  
 
 
 
 LAB AND IMAGING FINDINGS:  
 
Lab Results Component Value Date/Time WBC 33.8 (H) 01/29/2020 05:20 AM  
 HGB 7.1 (L) 01/29/2020 05:20 AM  
 PLATELET 34 (L) 86/89/3556 05:20 AM  
 
Lab Results Component Value Date/Time Sodium 147 (H) 01/29/2020 05:20 AM  
 Potassium 3.1 (L) 01/29/2020 05:20 AM  
 Chloride 118 (H) 01/29/2020 05:20 AM  
 CO2 18 (L) 01/29/2020 05:20 AM  
  (H) 01/29/2020 05:20 AM  
 Creatinine 2.47 (H) 01/29/2020 05:20 AM  
 Calcium 8.6 01/29/2020 05:20 AM  
 Magnesium 2.2 01/29/2020 05:20 AM  
 Phosphorus 3.7 01/26/2020 04:31 AM  
  
Lab Results Component Value Date/Time AST (SGOT) 31 01/25/2020 06:30 PM  
 Alk. phosphatase 111 01/25/2020 06:30 PM  
 Protein, total 7.1 01/25/2020 06:30 PM  
 Albumin 1.8 (L) 01/25/2020 06:30 PM  
 Globulin 5.3 (H) 01/25/2020 06:30 PM  
 
Lab Results Component Value Date/Time INR 1.0 10/30/2017 01:35 PM  
 Prothrombin time 13.0 10/30/2017 01:35 PM  
  
No results found for: IRON, FE, TIBC, IBCT, PSAT, FERR No results found for: PH, PCO2, PO2 No components found for: [place] Lab Results Component Value Date/Time  (H) 05/18/2019 10:27 PM  
 CK - MB 9.6 (H) 05/18/2019 10:27 PM  
  
 
 
   
 
Total time: 15 minutes Counseling / coordination time, spent as noted above: 10 minutes 
> 50% counseling / coordination?: yes, patient, family Prolonged service was provided for  []30 min   []75 min in face to face time in the presence of the patient, spent as noted above. Time Start:  
Time End: Note: this can only be billed with 01429 (initial) or 83923 (follow up). If multiple start / stop times, list each separately.

## 2020-01-30 NOTE — CDMP QUERY
Patient was admitted 1-25 with multiple diagnoses including necrotizing soft tissue infection and leukocytosis. Noted documentation of  \"sepsis, POA\" on 1-28 by ID. If possible, could you please clarify if patient is being treated / evaluated for any of the following: 
 
> sepsis, POA (present on admission) > sepsis, not POA (present on admission) > sepsis ruled out after study 
> other explanation of clinical findings 
> unable to determine The medical record reflects the following: 
 
---> Risk factors:  80 yr old female with multiple comorbidities 
 
---> Clinical indicators: * ED VS: T: 97.4; Pulse range: 65 -> 96; Resp range: 16 -> 39; 110/61; 100% ra * 1- Labs: WBC 29.5;  
   * 1- ID Consult:  \". [facility] Clinical presentation c/w sepsis (POA) due to necrotizing soft tissue infection of bilateral hips, sacrum. [facility] Acute renal failure: likely due to sepsis. [facility] \" 
 
---> Treatment:  labs; IV abx; Infectious Disease consult Thank you for your time, 
Julita Payan, [place], [address] [facility]

## 2020-01-30 NOTE — PROGRESS NOTES
Pt has been discharged to return home with [facility]. [facility] to transport at [address] with pt's daughter this morning and is aware and agreeable to transition plan. Jovanny Morrison, [phone]

## 2020-01-30 NOTE — PROGRESS NOTES
Problem: Falls - Risk of 
Goal: *Absence of Falls Description Document Tahir Reas Fall Risk and appropriate interventions in the flowsheet. Outcome: Progressing Towards Goal 
Note: Fall Risk Interventions: 
  
 
Mentation Interventions: Adequate sleep, hydration, pain control, Bed/chair exit alarm Medication Interventions: Bed/chair exit alarm Elimination Interventions: Toileting schedule/hourly rounds Problem: Patient Education: Go to Patient Education Activity Goal: Patient/Family Education Outcome: Progressing Towards Goal 
  
Problem: Pressure Injury - Risk of 
Goal: *Prevention of pressure injury Description Document Bebo Scale and appropriate interventions in the flowsheet. Outcome: Progressing Towards Goal 
Note: Pressure Injury Interventions: 
Sensory Interventions: Assess changes in LOC Moisture Interventions: Absorbent underpads, Check for incontinence Q2 hours and as needed, Contain wound drainage Activity Interventions: Assess need for specialty bed, PT/OT evaluation Mobility Interventions: Assess need for specialty bed, HOB 30 degrees or less, Turn and reposition approx. every two hours(pillow and wedges) Nutrition Interventions: Document food/fluid/supplement intake Friction and Shear Interventions: Apply protective barrier, creams and emollients, HOB 30 degrees or less, Minimize layers Problem: Patient Education: Go to Patient Education Activity Goal: Patient/Family Education Outcome: Progressing Towards Goal 
  
Problem: Risk for Spread of Infection Goal: Prevent transmission of infectious organism to others Description Prevent the transmission of infectious organisms to other patients, staff members, and visitors. Outcome: Progressing Towards Goal 
  
Problem: Patient Education:  Go to Education Activity Goal: Patient/Family Education Outcome: Progressing Towards Goal 
  
Problem: Nutrition Deficit Goal: *Optimize nutritional status Outcome: Progressing Towards Goal

## 2020-01-31 NOTE — PROGRESS NOTES
[address] [place] [address] and Hospice. Spoke with Arlin Hirsch. Provided 2 patient identifiers. Verified patient was accepted to hospice on 1/30/20. No transition of care outreach indicated due to patient discharge to hospice care.

## 2020-02-02 PROCEDURE — 0651 HSPC ROUTINE HOME CARE

## 2020-02-03 PROCEDURE — 0651 HSPC ROUTINE HOME CARE

## 2020-02-11 NOTE — DISCHARGE SUMMARY
[address] [place] [address] Dr Sampson Alford [facility], [address] DISCHARGE SUMMARY Name: Nandini Quispe MRN: [id number] Age / Sex: 80 y.o. / female CSN: [id number] YOB: 1933 Length of Stay: 5 days Admit Date: 1/25/2020 Discharge Date: PRIMARY CARE PHYSICIAN: DIAN Anaya 
 
 
DISCHARGE DIAGNOSES: 
 
Multiple pressure ulcers POA with suspected polymicrobial soft tissue infection secondary to right hip wound Hypernatremia is likely secondary to dehydration/volume depletion Acute kidney injury Possible aspiration pneumonia Unintentional weight loss Suspected severe protein calorie malnutrition Functional quadriplegia/bedbound at baseline Dementia Advanced age Sepsis POA 
 
CONSULTS CALLED: ID, nephrology PROCEDURES DONE: none COURSE IN THE HOSPITAL: This is a 69-year-old female who was brought to the ED with a change in mental status. Patient was evaluated and was admitted. Patient was noted to have multiple pressure ulcers with the polymicrobial infection. Cultures were obtained and patient was continued on antibiotics. ID was consulted. Patient was also noted to have acute kidney injury and hypernatremia. Patient was started on IV fluids and electrolytes were monitored. Wound care was also consulted. Discussions were held with the family and palliative care was involved. Subsequently patient was made DNR and DNI and family wished to take patient home with hospice. Arrangements were made and patient was discharged home with hospice. For details please refer to chart. MEDICATIONS ON DISCHARGE: 
 
Discharge Medication List as of 1/30/2020  9:58 AM  
  
START taking these medications  Details  
acetaminophen (TYLENOL) 650 mg suppository Insert 1 Suppository into rectum every eight (8) hours as needed for Fever., Print, Disp-15 Suppository, R-0  
  
morphine (ROXANOL) 100 mg/5 mL (20 mg/mL) concentrated solution 0.13 mL by SubLINGual route every four (4) hours as needed for Pain for up to 30 days. Max Daily Amount: 15.6 mg., Print, Disp-30 mL, R-0  
  
LORazepam (LORAZEPAM INTENSOL) 2 mg/mL concentrated solution Take 0.25 mL by mouth every four (4) hours as needed for Agitation or Anxiety. Max Daily Amount: 3 mg., Print, Disp-30 mL, R-0  
  
scopolamine (TRANSDERM-SCOP) 1 mg over 3 days pt3d 1 Patch by TransDERmal route every seventy-two (72) hours. , Print, Disp-5 Patch, R-0  
  
  
CONTINUE these medications which have NOT CHANGED Details  
dorzolamide (TRUSOPT) 2 % ophthalmic solution Administer 2 Drops to both eyes three (3) times daily. , Historical Med  
  
latanoprost (XALATAN) 0.005 % ophthalmic solution Administer 1 Drop to both eyes nightly., Historical Med  
  
peg 400-propylene glycol (SYSTANE) 0.4-0.3 % drop Administer 1 Drop to left eye as needed for Other (PRN Dry eyes). PRN Dry eyes, Historical Med  
  
  
STOP taking these medications  
  
 clindamycin (CLEOCIN) 300 mg capsule Comments:  
Reason for Stopping:   
   
 memantine (NAMENDA) 10 mg tablet Comments:  
Reason for Stopping: MULTIVITAMIN PO Comments:  
Reason for Stopping:   
   
 ferrous sulfate 324 mg (65 mg iron) tablet Comments:  
Reason for Stopping:   
   
 atorvastatin (LIPITOR) 80 mg tablet Comments:  
Reason for Stopping:   
   
 lisinopril (PRINIVIL, ZESTRIL) 5 mg tablet Comments:  
Reason for Stopping:   
   
 raNITIdine (ZANTAC) 150 mg tablet Comments:  
Reason for Stopping:   
   
 netarsudil mesylate (RHOPRESSA OP) Comments:  
Reason for Stopping:   
   
 hydrocortisone (ALA-DEEDEE) 1 % lotion Comments:  
Reason for Stopping: CALCIUM 600 600 mg calcium (1,500 mg) tablet Comments:  
Reason for Stopping:   
   
 acetaminophen (TYLENOL EXTRA STRENGTH) 500 mg tablet Comments:  
Reason for Stopping:   
   
 aspirin delayed-release 81 mg tablet Comments:  
Reason for Stopping:   
   
  
 
 
 
DISCHARGE VITAL SIGNS: 
Visit Vitals /57 (BP 1 Location: Left arm, BP Patient Position: At rest) Pulse 84 Temp 96 °F (35.6 °C) Resp 14 Wt 47.5 kg (104 lb 11.2 oz) SpO2 100% BMI 16.40 kg/m² DISPOSITION: home hospice FOLLOW-UP RECOMMENDATIONS:  
Follow-up Information Follow up With Specialties Details Why Contact Info  
    patient will be going home hospice care DIAN Stephens Physician Assistant   [address] [facility] [address] [facility] 
[phone] [facility] [address] [facility] [address] 
[phone] TIME SPENT ON DISCHARGE ACTIVITIES: More than 35 minutes. Dragon medical dictation software was used for portions of this report. Unintended errors may occur. Signed:  Agnieszka Singh MD 
 
  2/10/2020

## 2021-12-10 NOTE — TELEPHONE ENCOUNTER
[facility] Coordinator placed call to pt to schedule [facility] interrogation appt. No answer, LVM.  SN[facility] not interrogated since 4/2017 surgery.   Patient went to Neurologist yesterday and blood pressure 70/40. Her home health nurse is concerned because PCP hasn't followed up with family.   [phone]